# Patient Record
Sex: FEMALE | Race: BLACK OR AFRICAN AMERICAN | NOT HISPANIC OR LATINO | Employment: OTHER | URBAN - METROPOLITAN AREA
[De-identification: names, ages, dates, MRNs, and addresses within clinical notes are randomized per-mention and may not be internally consistent; named-entity substitution may affect disease eponyms.]

---

## 2019-01-14 ENCOUNTER — HOSPITAL ENCOUNTER (EMERGENCY)
Facility: HOSPITAL | Age: 69
Discharge: HOME | End: 2019-01-14
Attending: EMERGENCY MEDICINE
Payer: MEDICARE

## 2019-01-14 VITALS
HEART RATE: 89 BPM | DIASTOLIC BLOOD PRESSURE: 81 MMHG | RESPIRATION RATE: 21 BRPM | TEMPERATURE: 98 F | HEIGHT: 62 IN | BODY MASS INDEX: 22.08 KG/M2 | OXYGEN SATURATION: 98 % | SYSTOLIC BLOOD PRESSURE: 172 MMHG | WEIGHT: 120 LBS

## 2019-01-14 DIAGNOSIS — N39.0 URINARY TRACT INFECTION: ICD-10-CM

## 2019-01-14 LAB
BACTERIA #/AREA URNS HPF: ABNORMAL /HPF
BILIRUB UR QL STRIP: NEGATIVE
CLARITY UR: ABNORMAL
COLOR UR: YELLOW
GLUCOSE UR QL STRIP: NEGATIVE
HGB UR QL STRIP: ABNORMAL
HYALINE CASTS #/AREA URNS LPF: 0 /LPF
KETONES UR QL STRIP: NEGATIVE
LEUKOCYTE ESTERASE UR QL STRIP: ABNORMAL
MICROSCOPIC COMMENT: ABNORMAL
NITRITE UR QL STRIP: NEGATIVE
PH UR STRIP: 5 [PH] (ref 5–8)
POCT GLUCOSE: 103 MG/DL (ref 70–110)
PROT UR QL STRIP: ABNORMAL
RBC #/AREA URNS HPF: 5 /HPF (ref 0–4)
SP GR UR STRIP: 1.01 (ref 1–1.03)
URN SPEC COLLECT METH UR: ABNORMAL
UROBILINOGEN UR STRIP-ACNC: NEGATIVE EU/DL
WBC #/AREA URNS HPF: >100 /HPF (ref 0–5)

## 2019-01-14 PROCEDURE — 93010 ELECTROCARDIOGRAM REPORT: CPT | Mod: ,,, | Performed by: INTERNAL MEDICINE

## 2019-01-14 PROCEDURE — 87088 URINE BACTERIA CULTURE: CPT

## 2019-01-14 PROCEDURE — 87186 SC STD MICRODIL/AGAR DIL: CPT

## 2019-01-14 PROCEDURE — 87086 URINE CULTURE/COLONY COUNT: CPT

## 2019-01-14 PROCEDURE — 93010 EKG 12-LEAD: ICD-10-PCS | Mod: ,,, | Performed by: INTERNAL MEDICINE

## 2019-01-14 PROCEDURE — 87077 CULTURE AEROBIC IDENTIFY: CPT

## 2019-01-14 PROCEDURE — 99284 EMERGENCY DEPT VISIT MOD MDM: CPT | Mod: 25

## 2019-01-14 PROCEDURE — 81000 URINALYSIS NONAUTO W/SCOPE: CPT

## 2019-01-14 PROCEDURE — 25000003 PHARM REV CODE 250: Performed by: EMERGENCY MEDICINE

## 2019-01-14 PROCEDURE — 93005 ELECTROCARDIOGRAM TRACING: CPT

## 2019-01-14 PROCEDURE — 82962 GLUCOSE BLOOD TEST: CPT

## 2019-01-14 RX ORDER — SULFAMETHOXAZOLE AND TRIMETHOPRIM 800; 160 MG/1; MG/1
1 TABLET ORAL 2 TIMES DAILY
Qty: 14 TABLET | Refills: 0 | Status: SHIPPED | OUTPATIENT
Start: 2019-01-14 | End: 2019-01-21

## 2019-01-14 RX ORDER — SULFAMETHOXAZOLE AND TRIMETHOPRIM 800; 160 MG/1; MG/1
1 TABLET ORAL
Status: COMPLETED | OUTPATIENT
Start: 2019-01-14 | End: 2019-01-14

## 2019-01-14 RX ADMIN — SULFAMETHOXAZOLE AND TRIMETHOPRIM 1 TABLET: 800; 160 TABLET ORAL at 09:01

## 2019-01-15 NOTE — ED PROVIDER NOTES
Encounter Date: 1/14/2019    SCRIBE #1 NOTE: I, Berry Mccollum, am scribing for, and in the presence of,  Lele Huertas MD. I have scribed the following portions of the note - Other sections scribed: HPI, ROS, PE.       History     Chief Complaint   Patient presents with    Urinary Tract Infection     foul smelling urine.   was given abx x 1 week ago for same.   not sleeping.     CC: Urinary Tract Infection    68 year old female  has a past medical history of Amputee, below knee (bilateral), Dementia, Diabetes mellitus, and Hypertension presents to the ED accompanied w/ son for evaluation of chest pain. Pt's son reports pt complained of chest pain earlier. Pt's son wanted to bring her in for evaluation due to her dementia. At exam time, pt denies chest pain. Pt reports she was diagnosed w/ a UTI on 1/10/19 and given antibiotics. Pt reports she currently has a sting w/ urination. She wears adult diapers. Pt is a smoker. No other symptoms reported.       The history is provided by the patient and a relative. No  was used.     Review of patient's allergies indicates:  No Known Allergies  Past Medical History:   Diagnosis Date    Amputee, below knee bilateral    Dementia     Diabetes mellitus     Hypertension      Past Surgical History:   Procedure Laterality Date    bka      cardiac stents      HYSTERECTOMY       No family history on file.  Social History     Tobacco Use    Smoking status: Current Every Day Smoker     Packs/day: 1.00     Years: 15.00     Pack years: 15.00     Types: Cigarettes   Substance Use Topics    Alcohol use: No    Drug use: No     Review of Systems   Constitutional: Negative for appetite change and fever.   HENT: Negative for rhinorrhea and sore throat.    Eyes: Negative for visual disturbance.   Respiratory: Negative for cough and shortness of breath.    Cardiovascular: Negative for chest pain.   Gastrointestinal: Negative for abdominal pain.    Genitourinary: Positive for dysuria.   Musculoskeletal: Negative for gait problem.   Skin: Negative for rash.   Neurological: Negative for syncope.       Physical Exam     Initial Vitals [01/14/19 1805]   BP Pulse Resp Temp SpO2   (!) 169/70 96 16 98.3 °F (36.8 °C) 95 %      MAP       --         Physical Exam    ED Course   Procedures  Labs Reviewed   URINALYSIS, REFLEX TO URINE CULTURE - Abnormal; Notable for the following components:       Result Value    Appearance, UA Cloudy (*)     Protein, UA 2+ (*)     Occult Blood UA 2+ (*)     Leukocytes, UA 3+ (*)     All other components within normal limits    Narrative:     Preferred Collection Type->Urine, Clean Catch   URINALYSIS MICROSCOPIC - Abnormal; Notable for the following components:    RBC, UA 5 (*)     WBC, UA >100 (*)     Bacteria, UA Moderate (*)     All other components within normal limits    Narrative:     Preferred Collection Type->Urine, Clean Catch   CULTURE, URINE   POCT GLUCOSE MONITORING CONTINUOUS       EKG:  Normal sinus rhythm rate of 89 Q-waves V1 V2 V3 intraventricular conduction delay V2 V3 no evidence of ST elevation    Imaging Results    None                   Medical decision making         Clinical Impression:   The encounter diagnosis was Urinary tract infection.            Medical decision making patient with subjective complaints of urinary tract infection urinalysis confirms infection there is no evidence of toxicity, sepsis, pyelonephritis, acute abdomen.  Patient is discharged home with a prescription for Septra DS b.i.d. for 14 days urine culture pending at time of discharge.      I, Dr. Aleksandar Royal, personally performed the services described in this documentation.   All medical record entries made by the scribe were at my direction and in my presence.   I have reviewed the chart and agree that the record is accurate and complete.   Aleksandar Royal MD.  9:14 PM 01/14/2019        Aleksandar Royal MD  01/14/19 1319        Aleksandar Royal MD  01/14/19 [id number]

## 2019-01-16 LAB — BACTERIA UR CULT: NORMAL

## 2019-01-23 ENCOUNTER — HOSPITAL ENCOUNTER (INPATIENT)
Facility: HOSPITAL | Age: 69
LOS: 13 days | Discharge: HOSPICE | DRG: 189 | End: 2019-02-05
Attending: EMERGENCY MEDICINE | Admitting: HOSPITALIST
Payer: MEDICARE

## 2019-01-23 DIAGNOSIS — J96.11 CHRONIC RESPIRATORY FAILURE WITH HYPOXIA: ICD-10-CM

## 2019-01-23 DIAGNOSIS — J96.01 ACUTE RESPIRATORY FAILURE WITH HYPOXIA AND HYPERCAPNIA: Primary | ICD-10-CM

## 2019-01-23 DIAGNOSIS — J96.02 ACUTE RESPIRATORY FAILURE WITH HYPOXIA AND HYPERCAPNIA: Primary | ICD-10-CM

## 2019-01-23 DIAGNOSIS — I21.4 NSTEMI (NON-ST ELEVATED MYOCARDIAL INFARCTION): ICD-10-CM

## 2019-01-23 DIAGNOSIS — I27.20 PULMONARY HYPERTENSION: ICD-10-CM

## 2019-01-23 DIAGNOSIS — J44.9 COPD (CHRONIC OBSTRUCTIVE PULMONARY DISEASE): ICD-10-CM

## 2019-01-23 DIAGNOSIS — J96.12 CHRONIC RESPIRATORY FAILURE WITH HYPOXIA AND HYPERCAPNIA: ICD-10-CM

## 2019-01-23 DIAGNOSIS — Z51.5 PALLIATIVE CARE ENCOUNTER: ICD-10-CM

## 2019-01-23 DIAGNOSIS — I50.43 ACUTE ON CHRONIC COMBINED SYSTOLIC AND DIASTOLIC HEART FAILURE: ICD-10-CM

## 2019-01-23 DIAGNOSIS — J96.02 ACUTE RESPIRATORY ACIDOSIS: ICD-10-CM

## 2019-01-23 DIAGNOSIS — R07.9 CHEST PAIN: ICD-10-CM

## 2019-01-23 DIAGNOSIS — I50.41 ACUTE COMBINED SYSTOLIC AND DIASTOLIC HEART FAILURE: ICD-10-CM

## 2019-01-23 DIAGNOSIS — N17.9 AKI (ACUTE KIDNEY INJURY): ICD-10-CM

## 2019-01-23 DIAGNOSIS — E87.5 HYPERKALEMIA: ICD-10-CM

## 2019-01-23 DIAGNOSIS — J96.11 CHRONIC RESPIRATORY FAILURE WITH HYPOXIA AND HYPERCAPNIA: ICD-10-CM

## 2019-01-23 DIAGNOSIS — I50.42 CHRONIC COMBINED SYSTOLIC AND DIASTOLIC HEART FAILURE: ICD-10-CM

## 2019-01-23 LAB
ALBUMIN SERPL BCP-MCNC: 3.6 G/DL
ALLENS TEST: ABNORMAL
ALP SERPL-CCNC: 111 U/L
ALT SERPL W/O P-5'-P-CCNC: 8 U/L
ANION GAP SERPL CALC-SCNC: 15 MMOL/L (ref 8–16)
ANION GAP SERPL CALC-SCNC: 5 MMOL/L
ANION GAP SERPL CALC-SCNC: 8 MMOL/L
ANION GAP SERPL CALC-SCNC: 9 MMOL/L
AORTIC ROOT ANNULUS: 2.42 CM
AORTIC VALVE CUSP SEPERATION: 1.65 CM
ASCENDING AORTA: 2.4 CM
AST SERPL-CCNC: 19 U/L
AV INDEX (PROSTH): 0.65
AV MEAN GRADIENT: 2.38 MMHG
AV PEAK GRADIENT: 4.08 MMHG
AV VALVE AREA: 2.12 CM2
AV VELOCITY RATIO: 0.73
BACTERIA #/AREA URNS HPF: ABNORMAL /HPF
BASOPHILS # BLD AUTO: 0.01 K/UL
BASOPHILS NFR BLD: 0.1 %
BILIRUB SERPL-MCNC: 0.2 MG/DL
BILIRUB UR QL STRIP: NEGATIVE
BNP SERPL-MCNC: 2868 PG/ML
BSA FOR ECHO PROCEDURE: 1.54 M2
BUN SERPL-MCNC: 23 MG/DL
BUN SERPL-MCNC: 24 MG/DL (ref 6–30)
BUN SERPL-MCNC: 26 MG/DL
BUN SERPL-MCNC: 28 MG/DL
CALCIUM SERPL-MCNC: 8 MG/DL
CALCIUM SERPL-MCNC: 8.3 MG/DL
CALCIUM SERPL-MCNC: 8.8 MG/DL
CHLORIDE SERPL-SCNC: 104 MMOL/L
CHLORIDE SERPL-SCNC: 106 MMOL/L (ref 95–110)
CHLORIDE SERPL-SCNC: 108 MMOL/L
CHLORIDE SERPL-SCNC: 108 MMOL/L
CLARITY UR: ABNORMAL
CO2 SERPL-SCNC: 20 MMOL/L
CO2 SERPL-SCNC: 21 MMOL/L
CO2 SERPL-SCNC: 23 MMOL/L
COLOR UR: ABNORMAL
CREAT SERPL-MCNC: 2.6 MG/DL
CREAT SERPL-MCNC: 2.6 MG/DL (ref 0.5–1.4)
CREAT SERPL-MCNC: 2.7 MG/DL
CREAT SERPL-MCNC: 3 MG/DL
CV ECHO LV RWT: 0.3 CM
D DIMER PPP IA.FEU-MCNC: 1.11 MG/L FEU
DELSYS: ABNORMAL
DIFFERENTIAL METHOD: ABNORMAL
DOP CALC AO PEAK VEL: 1.01 M/S
DOP CALC AO VTI: 19.45 CM
DOP CALC LVOT AREA: 3.27 CM2
DOP CALC LVOT DIAMETER: 2.04 CM
DOP CALC LVOT PEAK VEL: 0.74 M/S
DOP CALC LVOT STROKE VOLUME: 41.33 CM3
DOP CALCLVOT PEAK VEL VTI: 12.65 CM
ECHO LV POSTERIOR WALL: 0.75 CM (ref 0.6–1.1)
EOSINOPHIL # BLD AUTO: 0.1 K/UL
EOSINOPHIL NFR BLD: 1.2 %
EP: 5
EP: 5
ERYTHROCYTE [DISTWIDTH] IN BLOOD BY AUTOMATED COUNT: 14.3 %
ERYTHROCYTE [SEDIMENTATION RATE] IN BLOOD BY WESTERGREN METHOD: 12 MM/H
ERYTHROCYTE [SEDIMENTATION RATE] IN BLOOD BY WESTERGREN METHOD: 12 MM/H
EST. GFR  (AFRICAN AMERICAN): 17 ML/MIN/1.73 M^2
EST. GFR  (AFRICAN AMERICAN): 20 ML/MIN/1.73 M^2
EST. GFR  (AFRICAN AMERICAN): 21 ML/MIN/1.73 M^2
EST. GFR  (NON AFRICAN AMERICAN): 15 ML/MIN/1.73 M^2
EST. GFR  (NON AFRICAN AMERICAN): 17 ML/MIN/1.73 M^2
EST. GFR  (NON AFRICAN AMERICAN): 18 ML/MIN/1.73 M^2
FIO2: 35
FIO2: 40
FLOW: 2
FRACTIONAL SHORTENING: 13 % (ref 28–44)
GLUCOSE SERPL-MCNC: 133 MG/DL
GLUCOSE SERPL-MCNC: 156 MG/DL
GLUCOSE SERPL-MCNC: 176 MG/DL
GLUCOSE SERPL-MCNC: 184 MG/DL (ref 70–110)
GLUCOSE UR QL STRIP: ABNORMAL
HCO3 UR-SCNC: 19.1 MMOL/L (ref 24–28)
HCO3 UR-SCNC: 19.8 MMOL/L (ref 24–28)
HCO3 UR-SCNC: 20.4 MMOL/L (ref 24–28)
HCT VFR BLD AUTO: 32.1 %
HCT VFR BLD CALC: 32 %PCV (ref 36–54)
HGB BLD-MCNC: 10.2 G/DL
HGB UR QL STRIP: ABNORMAL
HYALINE CASTS #/AREA URNS LPF: 0 /LPF
INTERVENTRICULAR SEPTUM: 0.72 CM (ref 0.6–1.1)
IP: 10
IP: 12
IVRT: 0.08 MSEC
KETONES UR QL STRIP: NEGATIVE
LA MAJOR: 4.7 CM
LA MINOR: 5.23 CM
LA WIDTH: 4.31 CM
LACTATE SERPL-SCNC: 2 MMOL/L
LACTATE SERPL-SCNC: 2.3 MMOL/L
LEFT ATRIUM SIZE: 4.15 CM
LEFT ATRIUM VOLUME INDEX: 48.9 ML/M2
LEFT ATRIUM VOLUME: 75.27 CM3
LEFT INTERNAL DIMENSION IN SYSTOLE: 4.4 CM (ref 2.1–4)
LEFT VENTRICLE DIASTOLIC VOLUME INDEX: 78.43 ML/M2
LEFT VENTRICLE DIASTOLIC VOLUME: 120.66 ML
LEFT VENTRICLE MASS INDEX: 80.4 G/M2
LEFT VENTRICLE SYSTOLIC VOLUME INDEX: 57.1 ML/M2
LEFT VENTRICLE SYSTOLIC VOLUME: 87.85 ML
LEFT VENTRICULAR INTERNAL DIMENSION IN DIASTOLE: 5.04 CM (ref 3.5–6)
LEFT VENTRICULAR MASS: 123.63 G
LEUKOCYTE ESTERASE UR QL STRIP: ABNORMAL
LYMPHOCYTES # BLD AUTO: 1.4 K/UL
LYMPHOCYTES NFR BLD: 18.7 %
MAGNESIUM SERPL-MCNC: 1.8 MG/DL
MCH RBC QN AUTO: 27.1 PG
MCHC RBC AUTO-ENTMCNC: 31.8 G/DL
MCV RBC AUTO: 85 FL
MICROSCOPIC COMMENT: ABNORMAL
MIN VOL: 5.1
MODE: ABNORMAL
MONOCYTES # BLD AUTO: 0.6 K/UL
MONOCYTES NFR BLD: 7.5 %
MV PEAK E VEL: 1.36 M/S
NEUTROPHILS # BLD AUTO: 5.5 K/UL
NEUTROPHILS NFR BLD: 72.5 %
NITRITE UR QL STRIP: NEGATIVE
PCO2 BLDA: 57.9 MMHG (ref 35–45)
PCO2 BLDA: 60.8 MMHG (ref 35–45)
PCO2 BLDA: 74.4 MMHG (ref 35–45)
PH SMN: 7.02 [PH] (ref 7.35–7.45)
PH SMN: 7.13 [PH] (ref 7.35–7.45)
PH SMN: 7.14 [PH] (ref 7.35–7.45)
PH UR STRIP: 5 [PH] (ref 5–8)
PISA TR MAX VEL: 4.13 M/S
PLATELET # BLD AUTO: 322 K/UL
PMV BLD AUTO: 10 FL
PO2 BLDA: 137 MMHG (ref 80–100)
PO2 BLDA: 145 MMHG (ref 80–100)
PO2 BLDA: 21 MMHG (ref 40–60)
POC BE: -12 MMOL/L
POC BE: -9 MMOL/L
POC BE: -9 MMOL/L
POC IONIZED CALCIUM: 1.2 MMOL/L (ref 1.06–1.42)
POC SATURATED O2: 17 % (ref 95–100)
POC SATURATED O2: 98 % (ref 95–100)
POC SATURATED O2: 98 % (ref 95–100)
POC TCO2 (MEASURED): 22 MMOL/L (ref 23–29)
POC TCO2: 21 MMOL/L (ref 24–29)
POC TCO2: 22 MMOL/L (ref 23–27)
POC TCO2: 22 MMOL/L (ref 23–27)
POCT GLUCOSE: 155 MG/DL (ref 70–110)
POCT GLUCOSE: 189 MG/DL (ref 70–110)
POCT GLUCOSE: 198 MG/DL (ref 70–110)
POTASSIUM BLD-SCNC: 7.2 MMOL/L (ref 3.5–5.1)
POTASSIUM SERPL-SCNC: 5.9 MMOL/L
POTASSIUM SERPL-SCNC: 6.1 MMOL/L
POTASSIUM SERPL-SCNC: 6.6 MMOL/L
PROT SERPL-MCNC: 7.2 G/DL
PROT UR QL STRIP: ABNORMAL
PV PEAK VELOCITY: 1 CM/S
RA MAJOR: 4.58 CM
RA PRESSURE: 8 MMHG
RA WIDTH: 3.32 CM
RBC # BLD AUTO: 3.77 M/UL
RBC #/AREA URNS HPF: 10 /HPF (ref 0–4)
RIGHT VENTRICULAR END-DIASTOLIC DIMENSION: 3 CM
RV TISSUE DOPPLER FREE WALL SYSTOLIC VELOCITY 1 (APICAL 4 CHAMBER VIEW): 8.72 M/S
SAMPLE: ABNORMAL
SINUS: 2.89 CM
SITE: ABNORMAL
SODIUM BLD-SCNC: 134 MMOL/L (ref 136–145)
SODIUM SERPL-SCNC: 133 MMOL/L
SODIUM SERPL-SCNC: 134 MMOL/L
SODIUM SERPL-SCNC: 139 MMOL/L
SP GR UR STRIP: 1.01 (ref 1–1.03)
SP02: 100
SP02: 95
SPONT RATE: 28
SPONT RATE: 30
SQUAMOUS #/AREA URNS HPF: 5 /HPF
STJ: 1.9 CM
TR MAX PG: 68.23 MMHG
TRICUSPID ANNULAR PLANE SYSTOLIC EXCURSION: 1.75 CM
TROPONIN I SERPL DL<=0.01 NG/ML-MCNC: 0.05 NG/ML
TROPONIN I SERPL DL<=0.01 NG/ML-MCNC: 0.09 NG/ML
TV REST PULMONARY ARTERY PRESSURE: 76 MMHG
URN SPEC COLLECT METH UR: ABNORMAL
UROBILINOGEN UR STRIP-ACNC: NEGATIVE EU/DL
WBC # BLD AUTO: 7.61 K/UL
WBC #/AREA URNS HPF: >100 /HPF (ref 0–5)

## 2019-01-23 PROCEDURE — 25000003 PHARM REV CODE 250: Performed by: EMERGENCY MEDICINE

## 2019-01-23 PROCEDURE — 96361 HYDRATE IV INFUSION ADD-ON: CPT

## 2019-01-23 PROCEDURE — 99291 PR CRITICAL CARE, E/M 30-74 MINUTES: ICD-10-PCS | Mod: ,,, | Performed by: NURSE PRACTITIONER

## 2019-01-23 PROCEDURE — 96368 THER/DIAG CONCURRENT INF: CPT

## 2019-01-23 PROCEDURE — 27000221 HC OXYGEN, UP TO 24 HOURS

## 2019-01-23 PROCEDURE — 96376 TX/PRO/DX INJ SAME DRUG ADON: CPT

## 2019-01-23 PROCEDURE — 84484 ASSAY OF TROPONIN QUANT: CPT

## 2019-01-23 PROCEDURE — 84295 ASSAY OF SERUM SODIUM: CPT

## 2019-01-23 PROCEDURE — 83605 ASSAY OF LACTIC ACID: CPT

## 2019-01-23 PROCEDURE — 63600175 PHARM REV CODE 636 W HCPCS: Performed by: EMERGENCY MEDICINE

## 2019-01-23 PROCEDURE — 87086 URINE CULTURE/COLONY COUNT: CPT

## 2019-01-23 PROCEDURE — 82800 BLOOD PH: CPT

## 2019-01-23 PROCEDURE — 99900035 HC TECH TIME PER 15 MIN (STAT)

## 2019-01-23 PROCEDURE — 85379 FIBRIN DEGRADATION QUANT: CPT

## 2019-01-23 PROCEDURE — 81000 URINALYSIS NONAUTO W/SCOPE: CPT

## 2019-01-23 PROCEDURE — 80048 BASIC METABOLIC PNL TOTAL CA: CPT | Mod: 91

## 2019-01-23 PROCEDURE — 25000242 PHARM REV CODE 250 ALT 637 W/ HCPCS: Performed by: INTERNAL MEDICINE

## 2019-01-23 PROCEDURE — 93010 ELECTROCARDIOGRAM REPORT: CPT | Mod: ,,, | Performed by: INTERNAL MEDICINE

## 2019-01-23 PROCEDURE — 93010 EKG 12-LEAD: ICD-10-PCS | Mod: 76,,, | Performed by: INTERNAL MEDICINE

## 2019-01-23 PROCEDURE — 83735 ASSAY OF MAGNESIUM: CPT

## 2019-01-23 PROCEDURE — 25000242 PHARM REV CODE 250 ALT 637 W/ HCPCS: Performed by: EMERGENCY MEDICINE

## 2019-01-23 PROCEDURE — 93005 ELECTROCARDIOGRAM TRACING: CPT

## 2019-01-23 PROCEDURE — 96367 TX/PROPH/DG ADDL SEQ IV INF: CPT

## 2019-01-23 PROCEDURE — 27000190 HC CPAP FULL FACE MASK W/VALVE

## 2019-01-23 PROCEDURE — 94761 N-INVAS EAR/PLS OXIMETRY MLT: CPT

## 2019-01-23 PROCEDURE — 63600175 PHARM REV CODE 636 W HCPCS: Performed by: INTERNAL MEDICINE

## 2019-01-23 PROCEDURE — 99291 CRITICAL CARE FIRST HOUR: CPT | Mod: ,,, | Performed by: NURSE PRACTITIONER

## 2019-01-23 PROCEDURE — 36600 WITHDRAWAL OF ARTERIAL BLOOD: CPT

## 2019-01-23 PROCEDURE — 94660 CPAP INITIATION&MGMT: CPT

## 2019-01-23 PROCEDURE — 82803 BLOOD GASES ANY COMBINATION: CPT

## 2019-01-23 PROCEDURE — 82565 ASSAY OF CREATININE: CPT

## 2019-01-23 PROCEDURE — 83880 ASSAY OF NATRIURETIC PEPTIDE: CPT

## 2019-01-23 PROCEDURE — 85014 HEMATOCRIT: CPT

## 2019-01-23 PROCEDURE — 84132 ASSAY OF SERUM POTASSIUM: CPT

## 2019-01-23 PROCEDURE — 94640 AIRWAY INHALATION TREATMENT: CPT

## 2019-01-23 PROCEDURE — 96375 TX/PRO/DX INJ NEW DRUG ADDON: CPT

## 2019-01-23 PROCEDURE — 93010 ELECTROCARDIOGRAM REPORT: CPT | Mod: 76,,, | Performed by: INTERNAL MEDICINE

## 2019-01-23 PROCEDURE — 80053 COMPREHEN METABOLIC PANEL: CPT

## 2019-01-23 PROCEDURE — 36415 COLL VENOUS BLD VENIPUNCTURE: CPT

## 2019-01-23 PROCEDURE — 82330 ASSAY OF CALCIUM: CPT

## 2019-01-23 PROCEDURE — 25000003 PHARM REV CODE 250: Performed by: INTERNAL MEDICINE

## 2019-01-23 PROCEDURE — 99291 CRITICAL CARE FIRST HOUR: CPT | Mod: 25

## 2019-01-23 PROCEDURE — 20000000 HC ICU ROOM

## 2019-01-23 PROCEDURE — 96365 THER/PROPH/DIAG IV INF INIT: CPT

## 2019-01-23 PROCEDURE — 85025 COMPLETE CBC W/AUTO DIFF WBC: CPT

## 2019-01-23 PROCEDURE — 87040 BLOOD CULTURE FOR BACTERIA: CPT

## 2019-01-23 RX ORDER — DEXTROSE 50 % IN WATER (D50W) INTRAVENOUS SYRINGE
25
Status: COMPLETED | OUTPATIENT
Start: 2019-01-23 | End: 2019-01-23

## 2019-01-23 RX ORDER — ACETAMINOPHEN 325 MG/1
650 TABLET ORAL EVERY 4 HOURS PRN
Status: DISCONTINUED | OUTPATIENT
Start: 2019-01-23 | End: 2019-02-05 | Stop reason: HOSPADM

## 2019-01-23 RX ORDER — ASPIRIN 325 MG
325 TABLET ORAL
Status: COMPLETED | OUTPATIENT
Start: 2019-01-23 | End: 2019-01-23

## 2019-01-23 RX ORDER — CARVEDILOL 6.25 MG/1
6.25 TABLET ORAL 2 TIMES DAILY
Status: DISCONTINUED | OUTPATIENT
Start: 2019-01-23 | End: 2019-01-24

## 2019-01-23 RX ORDER — ATORVASTATIN CALCIUM 10 MG/1
20 TABLET, FILM COATED ORAL NIGHTLY
Status: DISCONTINUED | OUTPATIENT
Start: 2019-01-23 | End: 2019-01-25

## 2019-01-23 RX ORDER — IPRATROPIUM BROMIDE AND ALBUTEROL SULFATE 2.5; .5 MG/3ML; MG/3ML
3 SOLUTION RESPIRATORY (INHALATION)
Status: DISCONTINUED | OUTPATIENT
Start: 2019-01-23 | End: 2019-02-05 | Stop reason: HOSPADM

## 2019-01-23 RX ORDER — AMLODIPINE BESYLATE 5 MG/1
10 TABLET ORAL DAILY
Status: DISCONTINUED | OUTPATIENT
Start: 2019-01-24 | End: 2019-01-24

## 2019-01-23 RX ORDER — CARVEDILOL 6.25 MG/1
6.25 TABLET ORAL
Status: COMPLETED | OUTPATIENT
Start: 2019-01-23 | End: 2019-01-23

## 2019-01-23 RX ORDER — ONDANSETRON 2 MG/ML
4 INJECTION INTRAMUSCULAR; INTRAVENOUS EVERY 8 HOURS PRN
Status: DISCONTINUED | OUTPATIENT
Start: 2019-01-23 | End: 2019-02-05 | Stop reason: HOSPADM

## 2019-01-23 RX ORDER — ENOXAPARIN SODIUM 100 MG/ML
30 INJECTION SUBCUTANEOUS EVERY 24 HOURS
Status: DISCONTINUED | OUTPATIENT
Start: 2019-01-23 | End: 2019-01-24

## 2019-01-23 RX ORDER — ACETAMINOPHEN 500 MG
1000 TABLET ORAL
Status: COMPLETED | OUTPATIENT
Start: 2019-01-23 | End: 2019-01-23

## 2019-01-23 RX ORDER — SODIUM CHLORIDE 9 MG/ML
1000 INJECTION, SOLUTION INTRAVENOUS
Status: COMPLETED | OUTPATIENT
Start: 2019-01-23 | End: 2019-01-23

## 2019-01-23 RX ORDER — METHYLPREDNISOLONE SOD SUCC 125 MG
125 VIAL (EA) INJECTION
Status: COMPLETED | OUTPATIENT
Start: 2019-01-23 | End: 2019-01-23

## 2019-01-23 RX ORDER — SODIUM BICARBONATE 1 MEQ/ML
50 SYRINGE (ML) INTRAVENOUS ONCE
Status: DISCONTINUED | OUTPATIENT
Start: 2019-01-23 | End: 2019-01-23

## 2019-01-23 RX ORDER — ALBUTEROL SULFATE 2.5 MG/.5ML
10 SOLUTION RESPIRATORY (INHALATION)
Status: COMPLETED | OUTPATIENT
Start: 2019-01-23 | End: 2019-01-23

## 2019-01-23 RX ORDER — SODIUM BICARBONATE 1 MEQ/ML
50 SYRINGE (ML) INTRAVENOUS
Status: COMPLETED | OUTPATIENT
Start: 2019-01-23 | End: 2019-01-23

## 2019-01-23 RX ORDER — IPRATROPIUM BROMIDE 0.5 MG/2.5ML
0.5 SOLUTION RESPIRATORY (INHALATION)
Status: COMPLETED | OUTPATIENT
Start: 2019-01-23 | End: 2019-01-23

## 2019-01-23 RX ORDER — GLUCAGON 1 MG
1 KIT INJECTION
Status: DISCONTINUED | OUTPATIENT
Start: 2019-01-23 | End: 2019-02-05 | Stop reason: HOSPADM

## 2019-01-23 RX ORDER — SODIUM CHLORIDE 0.9 % (FLUSH) 0.9 %
3 SYRINGE (ML) INJECTION
Status: DISCONTINUED | OUTPATIENT
Start: 2019-01-23 | End: 2019-02-05 | Stop reason: HOSPADM

## 2019-01-23 RX ORDER — SODIUM BICARBONATE 1 MEQ/ML
50 SYRINGE (ML) INTRAVENOUS ONCE
Status: COMPLETED | OUTPATIENT
Start: 2019-01-23 | End: 2019-01-23

## 2019-01-23 RX ORDER — IPRATROPIUM BROMIDE AND ALBUTEROL SULFATE 2.5; .5 MG/3ML; MG/3ML
3 SOLUTION RESPIRATORY (INHALATION) EVERY 4 HOURS
Status: DISCONTINUED | OUTPATIENT
Start: 2019-01-23 | End: 2019-01-23 | Stop reason: SDUPTHER

## 2019-01-23 RX ORDER — DONEPEZIL HYDROCHLORIDE 5 MG/1
5 TABLET, FILM COATED ORAL NIGHTLY
Status: DISCONTINUED | OUTPATIENT
Start: 2019-01-23 | End: 2019-01-25

## 2019-01-23 RX ORDER — INSULIN ASPART 100 [IU]/ML
0-5 INJECTION, SOLUTION INTRAVENOUS; SUBCUTANEOUS EVERY 6 HOURS PRN
Status: DISCONTINUED | OUTPATIENT
Start: 2019-01-23 | End: 2019-02-05 | Stop reason: HOSPADM

## 2019-01-23 RX ORDER — SODIUM CHLORIDE 9 MG/ML
INJECTION, SOLUTION INTRAVENOUS CONTINUOUS
Status: DISCONTINUED | OUTPATIENT
Start: 2019-01-23 | End: 2019-01-24

## 2019-01-23 RX ADMIN — DEXTROSE MONOHYDRATE 25 G: 25 INJECTION, SOLUTION INTRAVENOUS at 10:01

## 2019-01-23 RX ADMIN — SODIUM POLYSTYRENE SULFONATE 15 G: 15 SUSPENSION ORAL; RECTAL at 10:01

## 2019-01-23 RX ADMIN — CALCIUM GLUCONATE 1 G: 94 INJECTION, SOLUTION INTRAVENOUS at 07:01

## 2019-01-23 RX ADMIN — SODIUM BICARBONATE 50 MEQ: 84 INJECTION INTRAVENOUS at 06:01

## 2019-01-23 RX ADMIN — ACETAMINOPHEN 1000 MG: 500 TABLET, FILM COATED ORAL at 07:01

## 2019-01-23 RX ADMIN — ATORVASTATIN CALCIUM 20 MG: 10 TABLET, FILM COATED ORAL at 09:01

## 2019-01-23 RX ADMIN — CARVEDILOL 6.25 MG: 6.25 TABLET, FILM COATED ORAL at 09:01

## 2019-01-23 RX ADMIN — SODIUM BICARBONATE 50 MEQ: 84 INJECTION, SOLUTION INTRAVENOUS at 12:01

## 2019-01-23 RX ADMIN — INSULIN HUMAN 8 UNITS: 100 INJECTION, SOLUTION PARENTERAL at 06:01

## 2019-01-23 RX ADMIN — CEFTRIAXONE 2 G: 2 INJECTION, SOLUTION INTRAVENOUS at 07:01

## 2019-01-23 RX ADMIN — METHYLPREDNISOLONE SODIUM SUCCINATE 125 MG: 125 INJECTION, POWDER, FOR SOLUTION INTRAMUSCULAR; INTRAVENOUS at 07:01

## 2019-01-23 RX ADMIN — SODIUM CHLORIDE 1000 ML: 0.9 INJECTION, SOLUTION INTRAVENOUS at 10:01

## 2019-01-23 RX ADMIN — ENOXAPARIN SODIUM 30 MG: 100 INJECTION SUBCUTANEOUS at 04:01

## 2019-01-23 RX ADMIN — ASPIRIN 325 MG ORAL TABLET 325 MG: 325 PILL ORAL at 05:01

## 2019-01-23 RX ADMIN — CARVEDILOL 6.25 MG: 6.25 TABLET, FILM COATED ORAL at 07:01

## 2019-01-23 RX ADMIN — IPRATROPIUM BROMIDE 0.5 MG: 0.5 SOLUTION RESPIRATORY (INHALATION) at 07:01

## 2019-01-23 RX ADMIN — SODIUM CHLORIDE 1632 ML: 0.9 INJECTION, SOLUTION INTRAVENOUS at 07:01

## 2019-01-23 RX ADMIN — IPRATROPIUM BROMIDE AND ALBUTEROL SULFATE 3 ML: .5; 3 SOLUTION RESPIRATORY (INHALATION) at 04:01

## 2019-01-23 RX ADMIN — ALBUTEROL SULFATE 10 MG: 2.5 SOLUTION RESPIRATORY (INHALATION) at 07:01

## 2019-01-23 RX ADMIN — INSULIN HUMAN 2 UNITS: 100 INJECTION, SOLUTION PARENTERAL at 10:01

## 2019-01-23 RX ADMIN — DONEPEZIL HYDROCHLORIDE 5 MG: 5 TABLET, FILM COATED ORAL at 09:01

## 2019-01-23 RX ADMIN — INSULIN HUMAN 3 UNITS: 100 INJECTION, SOLUTION PARENTERAL at 10:01

## 2019-01-23 RX ADMIN — CALCIUM GLUCONATE 1 G: 94 INJECTION, SOLUTION INTRAVENOUS at 10:01

## 2019-01-23 RX ADMIN — SODIUM POLYSTYRENE SULFONATE 15 G: 15 SUSPENSION ORAL; RECTAL at 09:01

## 2019-01-23 RX ADMIN — DEXTROSE MONOHYDRATE 12.5 G: 25 INJECTION, SOLUTION INTRAVENOUS at 06:01

## 2019-01-23 RX ADMIN — IPRATROPIUM BROMIDE AND ALBUTEROL SULFATE 3 ML: .5; 3 SOLUTION RESPIRATORY (INHALATION) at 08:01

## 2019-01-23 RX ADMIN — SODIUM CHLORIDE: 0.9 INJECTION, SOLUTION INTRAVENOUS at 04:01

## 2019-01-23 NOTE — SUBJECTIVE & OBJECTIVE
Past Medical History:   Diagnosis Date    Amputee, below knee bilateral    Cigarette smoker     Dementia     Diabetes mellitus     Hypertension     Requires assistance with activities of daily living (ADL)     Wheelchair dependent        Past Surgical History:   Procedure Laterality Date    bka      cardiac stents      HYSTERECTOMY         Review of patient's allergies indicates:  No Known Allergies    No current facility-administered medications on file prior to encounter.      Current Outpatient Medications on File Prior to Encounter   Medication Sig    amlodipine (NORVASC) 10 MG tablet Take 10 mg by mouth once daily.    atorvastatin (LIPITOR) 20 MG tablet Take 20 mg by mouth every evening.    carvedilol (COREG) 6.25 MG tablet Take 6.25 mg by mouth 2 (two) times daily.    donepezil (ARICEPT) 5 MG tablet Take 5 mg by mouth every evening.    hydrochlorothiazide (HYDRODIURIL) 25 MG tablet Take 25 mg by mouth once daily.    lisinopril (PRINIVIL,ZESTRIL) 5 MG tablet Take 5 mg by mouth once daily.     Family History     None        Tobacco Use    Smoking status: Current Every Day Smoker     Packs/day: 1.00     Years: 15.00     Pack years: 15.00     Types: Cigarettes    Smokeless tobacco: Never Used   Substance and Sexual Activity    Alcohol use: No    Drug use: No    Sexual activity: Not on file     Review of Systems   Constitutional: Negative.    HENT: Negative.    Respiratory: Positive for cough and shortness of breath.    Cardiovascular: Positive for chest pain (when coughing).   Gastrointestinal: Positive for vomiting.   Endocrine: Negative.    Genitourinary: Negative.    Musculoskeletal: Negative.    Skin: Negative.    Neurological: Negative.    Hematological: Negative.    Psychiatric/Behavioral: Negative.      Objective:     Vital Signs (Most Recent):  Temp: 98.6 °F (37 °C) (01/23/19 1447)  Pulse: 85 (01/23/19 1532)  Resp: (!) 33 (01/23/19 1532)  BP: 138/63 (01/23/19 1532)  SpO2: 100 %  (01/23/19 1232) Vital Signs (24h Range):  Temp:  [98.6 °F (37 °C)-101.4 °F (38.6 °C)] 98.6 °F (37 °C)  Pulse:  [] 85  Resp:  [21-41] 33  SpO2:  [86 %-100 %] 100 %  BP: ()/(52-83) 138/63     Weight: 54.4 kg (120 lb)  Body mass index is 21.95 kg/m².    Physical Exam   Constitutional: She appears well-developed. No distress.   Non toxic appearing   HENT:   Head: Normocephalic and atraumatic.   Mouth/Throat: Oropharynx is clear and moist.   Eyes: Conjunctivae and EOM are normal.   Neck: Normal range of motion. No JVD present.   Cardiovascular: Normal rate and regular rhythm.   Pulmonary/Chest: She has no wheezes. She has no rales.   Breathing comfortably on BiPAP  Speaking full sentences   Abdominal: Soft. Bowel sounds are normal.   Musculoskeletal:   Bilateral BKA   Neurological: She is alert.   Oriented to person, place and situation. Not time   Skin: Skin is warm and dry. She is not diaphoretic.   Psychiatric: She has a normal mood and affect. Her behavior is normal. Judgment and thought content normal.   Nursing note and vitals reviewed.        CRANIAL NERVES     CN III, IV, VI   Extraocular motions are normal.        Significant Labs: All pertinent labs within the past 24 hours have been reviewed.    Significant Imaging: I have reviewed all pertinent imaging results/findings within the past 24 hours.  I have reviewed and interpreted all pertinent imaging results/findings within the past 24 hours.

## 2019-01-23 NOTE — ASSESSMENT & PLAN NOTE
Unsure if true renal failure or side effect from bactrim  Agree with IVFs for now  Repeat Renal panel now to re-check potassium level  Hold off on metformin and ACE-I (pending patient's home med list to confirm she is on these)  Strict I/Os

## 2019-01-23 NOTE — ED PROVIDER NOTES
Encounter Date: 1/23/2019    SCRIBE #1 NOTE: I, Nadeem Ramires, am scribing for, and in the presence of,  Helen Nettles MD. I have scribed the following portions of the note - Other sections scribed: HPI, ROS and PE.       History     Chief Complaint   Patient presents with    Shortness of Breath     called out for SOB that started tonight; pt has hx asthma; O2 sats 90% RA, 100% after breathing tx and 125 solumedrol     CC: Shortness of Breath     HPI: This 69 y.o F smoker (1ppd) with Bilateral BKA, Dementia, DM, and HTN presents to the ED via EMS accompanied by her son c/o SOB since 0100h. Per EMS, the pt's O2 sat was 90% RA on arrival. Her O2 improved to 100% after breathing tx and 125 Solumedrol. The pt's son reports a gradually worsening non-productive cough and SOB since 0100h with associated post-tussive emesis. The pt reports chest pain only when coughing and fatigue. The pt was last seen in this ED 1/14/19 and was Rx'ed Bactrim x2/day x7 days. She was administered Nyquil at 2300h last night. She did not take any medication this AM. Further hx is limited due to dementia.       The history is provided by the patient. No  was used.     Review of patient's allergies indicates:  No Known Allergies  Past Medical History:   Diagnosis Date    Amputee, below knee bilateral    Dementia     Diabetes mellitus     Hypertension      Past Surgical History:   Procedure Laterality Date    bka      cardiac stents      HYSTERECTOMY       History reviewed. No pertinent family history.  Social History     Tobacco Use    Smoking status: Current Every Day Smoker     Packs/day: 1.00     Years: 15.00     Pack years: 15.00     Types: Cigarettes   Substance Use Topics    Alcohol use: No    Drug use: No     Review of Systems   Unable to perform ROS: Dementia   Constitutional: Positive for fatigue.   Respiratory: Positive for cough (non-productive) and shortness of breath.    Cardiovascular: Positive for  chest pain (only when coughing).   Skin: Negative for rash.       Physical Exam     Initial Vitals [01/23/19 0519]   BP Pulse Resp Temp SpO2   (!) 168/73 (!) 120 (!) 22 99.1 °F (37.3 °C) 100 %      MAP       --         Physical Exam    Nursing note and vitals reviewed.  Constitutional: She is not diaphoretic. No distress.   HENT:   Head: Normocephalic and atraumatic.   Mouth/Throat: Oropharynx is clear and moist.   Eyes: EOM are normal. Pupils are equal, round, and reactive to light. No scleral icterus.   Neck: Normal range of motion. Neck supple. No JVD present.   Cardiovascular: Normal rate and regular rhythm.   Pulmonary/Chest: No stridor. No respiratory distress. She has decreased breath sounds in the right upper field, the right middle field and the right lower field. She has wheezes.   Tachypneic   Abdominal: Soft. Bowel sounds are normal. She exhibits no distension. There is no tenderness.   Musculoskeletal: Normal range of motion. She exhibits no edema or tenderness.   Neurological: She is alert and oriented to person, place, and time. She has normal strength. No cranial nerve deficit or sensory deficit.   Skin: Skin is warm and dry.   Psychiatric: She has a normal mood and affect.         ED Course   Procedures  Labs Reviewed   CBC W/ AUTO DIFFERENTIAL - Abnormal; Notable for the following components:       Result Value    RBC 3.77 (*)     Hemoglobin 10.2 (*)     Hematocrit 32.1 (*)     MCHC 31.8 (*)     All other components within normal limits   COMPREHENSIVE METABOLIC PANEL - Abnormal; Notable for the following components:    Sodium 133 (*)     Potassium 6.1 (*)     CO2 20 (*)     Glucose 156 (*)     Creatinine 3.0 (*)     ALT 8 (*)     eGFR if  17 (*)     eGFR if non  15 (*)     All other components within normal limits   TROPONIN I - Abnormal; Notable for the following components:    Troponin I 0.050 (*)     All other components within normal limits   B-TYPE NATRIURETIC  PEPTIDE - Abnormal; Notable for the following components:    BNP 2,868 (*)     All other components within normal limits   URINALYSIS, REFLEX TO URINE CULTURE - Abnormal; Notable for the following components:    Color, UA Red (*)     Appearance, UA Cloudy (*)     Protein, UA 2+ (*)     Glucose, UA 1+ (*)     Occult Blood UA 2+ (*)     Leukocytes, UA 2+ (*)     All other components within normal limits    Narrative:     Preferred Collection Type->Urine, Clean Catch   URINALYSIS MICROSCOPIC - Abnormal; Notable for the following components:    RBC, UA 10 (*)     WBC, UA >100 (*)     Bacteria, UA Moderate (*)     All other components within normal limits    Narrative:     Preferred Collection Type->Urine, Clean Catch   POCT GLUCOSE - Abnormal; Notable for the following components:    POCT Glucose 189 (*)     All other components within normal limits   CULTURE, BLOOD   CULTURE, BLOOD   CULTURE, URINE   LACTIC ACID, PLASMA   MAGNESIUM   TROPONIN I     EKG Readings: (Independently Interpreted)   Initial Reading: No STEMI. Rhythm: Sinus Tachycardia. Heart Rate: 121. Conduction: Normal. ST Segments: Non-Specific ST Segment Depression. Q Waves: I, II, III, V5 and V6.       Imaging Results          X-Ray Chest AP Portable (Final result)  Result time 01/23/19 06:07:28    Final result by Kerrie Dickerson MD (01/23/19 06:07:28)                 Impression:      Cardiomegaly.  No acute intrathoracic abnormality identified on this single radiographic view of the chest.      Electronically signed by: Kerrie Dickerson MD  Date:    01/23/2019  Time:    06:07             Narrative:    EXAMINATION:  XR CHEST AP PORTABLE    CLINICAL HISTORY:  Chest Pain;    TECHNIQUE:  Single frontal view of the chest was performed.    COMPARISON:  Chest radiograph 08/09/2013    FINDINGS:  Monitoring leads overlie the chest.  The cardiomediastinal silhouette appears to be prominent in size.  The visualized airway is unremarkable.  The lungs appear  symmetrically aerated without definite focal alveolar consolidation. No large pleural effusion or pneumothorax is appreciated.Visualized osseous structures demonstrate no acute abnormality.                              X-Rays:   Independently Interpreted Readings:   Chest X-Ray: No infiltrates.  No acute abnormalities.     Medical Decision Making:   History:   Old Medical Records: I decided to obtain old medical records.  Old Records Summarized: other records.       <> Summary of Records: Diagnosed with UTI 1/14/19  Differential Diagnosis:   COPD exacerbation  Pneumonia  Aspiration  CHF exacerbation  ACS  UTI    Independently Interpreted Test(s):   I have ordered and independently interpreted X-rays - see prior notes.  I have ordered and independently interpreted EKG Reading(s) - see prior notes  Clinical Tests:   Lab Tests: Ordered and Reviewed  Radiological Study: Ordered and Reviewed  Medical Tests: Ordered and Reviewed  ED Management:  Patient afebrile and in no acute distress at time history and physical.  Patient noted to be tachycardic and mildly tachypneic.  Patient has not taken any of her a.m. medications at time history and physical.  EKG with inferior and lateral T-wave inversions appear new compared to prior.  Patient given aspirin.  Labs notable for hyperkalemia and acute kidney injury. Patient given IV hydration, calcium gluconate, albuterol nebs.  She is to be admitted to Medicine for further management of hyperkalemia and renal failure.    I spent a total of 60 minutes caring for and overseeing the critical care of this patient. Critical care time was spent treating or preventing major adverse outcome resulting from hyperkalemia, renal failure.  Patient was specifically observed and treated with IV hydration, EKG, interpretation of EKG, calcium gluconate, albuterol, Kayexalate, followed by discussion with hospitalist and admission to the hospital.       Addendum:  While awaiting transfer to her  inpatient bed patient had an episode where she became diaphoretic and agitated.  Repeat EKG did not have definite acute ischemic changes.  I-STAT Chem 8 and VBG ordered.  Patient markedly acidotic at 7.016 is, and potassium and I status 7.2.  Patient given additional calcium gluconate as well as insulin, dextrose, bicarb.  Case discussed with Dr. Jean who recommended observing patient in the ED while awaiting repeat lactic acid and determining the patient is stable. Repeat lactic acid trended up to 2.3.  Case discussed with Dr. Valdes of ICU who recommends placing patient on BiPAP and performing an ABG in 1 hr to determine if acidosis has improved.  ABG after BiPAP has some improvement in pH to 7.14.  Repeat lab findings discussed with Dr. Lentz who recommends admission to ICU.  This chart was completed using dictation software, as a result there may be some typographical errors.             Scribe Attestation:   Scribe #1: I performed the above scribed service and the documentation accurately describes the services I performed. I attest to the accuracy of the note.    Attending Attestation:           Physician Attestation for Scribe:  Physician Attestation Statement for Scribe #1: I, Helen Nettles MD, reviewed documentation, as scribed by Nadeem Ramires in my presence, and it is both accurate and complete.                    Clinical Impression:   The primary encounter diagnosis was Hyperkalemia. Diagnoses of Chest pain and REGLA (acute kidney injury) were also pertinent to this visit.      Disposition:   Disposition: Admitted  Condition: Serious                        Helen Nettles MD  01/23/19 0904       Helen Nettles MD  01/23/19 1431

## 2019-01-23 NOTE — H&P
[facility] Hospital Medicine  History & Physical    Patient Name: Zakiya Garcia  MRN: [id number]  Admission Date: 1/23/2019  Attending Physician: Heeln Nettles MD   Primary Care Provider: Ines Barragan MD         Patient information was obtained from patient, relative(s), past medical records and ER records.     Subjective:     Principal Problem:Acute respiratory acidosis    Chief Complaint:   Chief Complaint   Patient presents with    Shortness of Breath     called out for SOB that started tonight; pt has hx asthma; O2 sats 90% RA, 100% after breathing tx and 125 solumedrol        HPI: 69 year old female with hypertension, dementia, s/p dorota BKA, hyperlipidemia, smoker and dementia who presented with shortness of breath. Per EMS, patient's sats were 90% at room air then 100% after breathing treatment and a dose of solumedrol 125 mg IM. Patient's son reported gradual worsening of non productive cough as well as emesis after coughing this AM. Chest pain when coughing. Patient was last seen in the ED on 1/14 (~10 days PTA) for a UTI. Was prescribed bactrim BID x 7 days. Patient is not sure if still taking metformin or lisinopril. Son will bring list of meds.     In the ED, patient was tachycardic, with low grade fever, with tachypnea and sat 92% at room air. Lungs clear to auscultation and XRay without evidence of consolidation or edema. Labs showed moderate hyperkalemia, acute renal failure and elevated lactic acid. ABG showed pH of 7.0 with hypercapnia. Patient admitted to ICU for close monitoring given such significant metabolic derangements.     Past Medical History:   Diagnosis Date    Amputee, below knee bilateral    Cigarette smoker     Dementia     Diabetes mellitus     Hypertension     Requires assistance with activities of daily living (ADL)     Wheelchair dependent        Past Surgical History:   Procedure Laterality Date    bka      cardiac stents      HYSTERECTOMY          Review of patient's allergies indicates:  No Known Allergies    No current facility-administered medications on file prior to encounter.      Current Outpatient Medications on File Prior to Encounter   Medication Sig    amlodipine (NORVASC) 10 MG tablet Take 10 mg by mouth once daily.    atorvastatin (LIPITOR) 20 MG tablet Take 20 mg by mouth every evening.    carvedilol (COREG) 6.25 MG tablet Take 6.25 mg by mouth 2 (two) times daily.    donepezil (ARICEPT) 5 MG tablet Take 5 mg by mouth every evening.    hydrochlorothiazide (HYDRODIURIL) 25 MG tablet Take 25 mg by mouth once daily.    lisinopril (PRINIVIL,ZESTRIL) 5 MG tablet Take 5 mg by mouth once daily.     Family History     None        Tobacco Use    Smoking status: Current Every Day Smoker     Packs/day: 1.00     Years: 15.00     Pack years: 15.00     Types: Cigarettes    Smokeless tobacco: Never Used   Substance and Sexual Activity    Alcohol use: No    Drug use: No    Sexual activity: Not on file     Review of Systems   Constitutional: Negative.    HENT: Negative.    Respiratory: Positive for cough and shortness of breath.    Cardiovascular: Positive for chest pain (when coughing).   Gastrointestinal: Positive for vomiting.   Endocrine: Negative.    Genitourinary: Negative.    Musculoskeletal: Negative.    Skin: Negative.    Neurological: Negative.    Hematological: Negative.    Psychiatric/Behavioral: Negative.      Objective:     Vital Signs (Most Recent):  Temp: 98.6 °F (37 °C) (01/23/19 1447)  Pulse: 85 (01/23/19 1532)  Resp: (!) 33 (01/23/19 1532)  BP: 138/63 (01/23/19 1532)  SpO2: 100 % (01/23/19 1532) Vital Signs (24h Range):  Temp:  [98.6 °F (37 °C)-101.4 °F (38.6 °C)] 98.6 °F (37 °C)  Pulse:  [] 85  Resp:  [21-41] 33  SpO2:  [86 %-100 %] 100 %  BP: ()/(52-83) 138/63     Weight: 54.4 kg (120 lb)  Body mass index is 21.95 kg/m².    Physical Exam   Constitutional: She appears well-developed. No distress.   Non toxic  appearing   HENT:   Head: Normocephalic and atraumatic.   Mouth/Throat: Oropharynx is clear and moist.   Eyes: Conjunctivae and EOM are normal.   Neck: Normal range of motion. No JVD present.   Cardiovascular: Normal rate and regular rhythm.   Pulmonary/Chest: She has no wheezes. She has no rales.   Breathing comfortably on BiPAP  Speaking full sentences   Abdominal: Soft. Bowel sounds are normal.   Musculoskeletal:   Bilateral BKA   Neurological: She is alert.   Oriented to person, place and situation. Not time   Skin: Skin is warm and dry. She is not diaphoretic.   Psychiatric: She has a normal mood and affect. Her behavior is normal. Judgment and thought content normal.   Nursing note and vitals reviewed.        CRANIAL NERVES     CN III, IV, VI   Extraocular motions are normal.        Significant Labs: All pertinent labs within the past 24 hours have been reviewed.    Significant Imaging: I have reviewed all pertinent imaging results/findings within the past 24 hours.  I have reviewed and interpreted all pertinent imaging results/findings within the past 24 hours.    Assessment/Plan:     * Acute respiratory acidosis    With hypercapnia and pH of 7  Surprisingly patient is very alert and able to hold a conversation while on BiPAP  Repeat ABG with not much improvement after being on BiPAP for about 1.5 hours  However, she was only on 10/5, which is not sufficient for hypercapnia  Will increase IPAP to 12 and repeat ABG in 1 hour  O2 sats adequate on low requirements. Good respiratory effort  COPD??         Acute respiratory failure with hypoxia and hypercapnia    As above       Hyperkalemia, diminished renal excretion    2/2 to bactrim vs renal failure vs both  No peaked T wave or QRS widening or P wave flat  Treated in the ED  Repeat BMP now       Acute renal failure    Unsure if true renal failure or side effect from bactrim  Agree with IVFs for now  Repeat Renal panel now to re-check potassium level  Hold off  on metformin and ACE-I (pending patient's home med list to confirm she is on these)  Strict I/Os       Urinary tract infection    Abnormal urinalysis  UCx pending  Agree with empiric ceftriaxone       Diabetes mellitus type 2 in nonobese    Hold metformin and start insulin  Adjust as needed for goal -180       Hypertension             VTE Risk Mitigation (From admission, onward)    None             Shyann Lentz MD  Department of Hospital Medicine   [facility]

## 2019-01-23 NOTE — CONSULTS
69 year old female with hypertension, dementia, s/p dorota BKA, hyperlipidemia, smoker and dementia who presented with shortness of breath. We were consulted for hypercapnea. She was seen in ICU. She is awake, alert and oriented wearing BIPAP. Adjusted BIPAP settings to 15/5 30%. Lung auscultation with bilateral crackles at the bases and wheezes noted to RLL.    Respiratory acidosis:  -wear BIPAP overnight  -Follow symptoms and alertness, not ABG  -continue Duo-Nebs q4    Thank you for the consult. We will follow up with the patient with full consult note to follow in am.    Critical Care time 40 minutes.    Above discussed with Dr. Barrera Madsen, NP  Pulmonary/Critical Care Medicine

## 2019-01-23 NOTE — ASSESSMENT & PLAN NOTE
With hypercapnia and pH of 7  Surprisingly patient is very alert and able to hold a conversation while on BiPAP  Repeat ABG with not much improvement after being on BiPAP for about 1.5 hours  However, she was only on 10/5, which is not sufficient for hypercapnia  Will increase IPAP to 12 and repeat ABG in 1 hour  O2 sats adequate on low requirements. Good respiratory effort  COPD??

## 2019-01-23 NOTE — PROGRESS NOTES
Results for MEHRAN WHITTINGTON (MRN [id number]) as of 1/23/2019 14:10   Ref. Range 1/23/2019 14:01   POC PH Latest Ref Range: 7.35 - 7.45  7.143 (LL)   POC PCO2 Latest Ref Range: 35 - 45 mmHg 57.9 (HH)   POC PO2 Latest Ref Range: 80 - 100 mmHg 137 (H)   POC BE Latest Ref Range: -2 to 2 mmol/L -9   POC HCO3 Latest Ref Range: 24 - 28 mmol/L 19.8 (L)   POC SATURATED O2 Latest Ref Range: 95 - 100 % 98   POC TCO2 Latest Ref Range: 23 - 27 mmol/L 22 (L)   FiO2 Unknown 40   Sample Unknown ARTERIAL   DelSys Unknown CPAP/BiPAP   Allens Test Unknown Pass   Site Unknown RB   Mode Unknown BiPAP   Rate Unknown 12

## 2019-01-23 NOTE — HPI
69 year old female with hypertension, dementia, s/p dorota BKA, hyperlipidemia, smoker and dementia who presented with shortness of breath. Per EMS, patient's sats were 90% at room air then 100% after breathing treatment and a dose of solumedrol 125 mg IM. Patient's son reported gradual worsening of non productive cough as well as emesis after coughing this AM. Chest pain when coughing. Patient was last seen in the ED on 1/14 (~10 days PTA) for a UTI. Was prescribed bactrim BID x 7 days. Patient is not sure if still taking metformin or lisinopril. Son will bring list of meds.     In the ED, patient was tachycardic, with low grade fever, with tachypnea and sat 92% at room air. Lungs clear to auscultation and XRay without evidence of consolidation or edema. Labs showed moderate hyperkalemia, acute renal failure and elevated lactic acid. ABG showed pH of 7.0 with hypercapnia. Patient admitted to ICU for close monitoring given such significant metabolic derangements.

## 2019-01-23 NOTE — PROGRESS NOTES
Results for MEHRAN WHITTINGTON (MRN [id number]) as of 1/23/2019 10:52   Ref. Range 1/23/2019 10:45   POC PH Latest Ref Range: 7.35 - 7.45  7.016 (L)   POC PCO2 Latest Ref Range: 35 - 45 mmHg 74.4 (H)   POC PO2 Latest Ref Range: 40 - 60 mmHg 21 (LL)   POC BE Latest Ref Range: -2 to 2 mmol/L -12   POC HCO3 Latest Ref Range: 24 - 28 mmol/L 19.1 (L)   POC SATURATED O2 Latest Ref Range: 95 - 100 % 17 (L)   POC TCO2 Latest Ref Range: 24 - 29 mmol/L 21 (L)   Flow Unknown 2   Sample Unknown VENOUS   DelSys Unknown Nasal Can   Allens Test Unknown N/A   Site Unknown Other   Mode Unknown SPONT

## 2019-01-23 NOTE — ED TRIAGE NOTES
Per patients son patient has been experiencing SOB since 0200. Patient report palpitations but denies chest pain. Patient received breathing treatment by ems in route to hospital.

## 2019-01-23 NOTE — ASSESSMENT & PLAN NOTE
2/2 to bactrim vs renal failure vs both  No peaked T wave or QRS widening or P wave flat  Treated in the ED  Repeat BMP now

## 2019-01-23 NOTE — PROGRESS NOTES
Results for MEHRAN WHITTINGTON (MRN [id number]) as of 1/23/2019 10:43   Ref. Range 1/23/2019 10:33   POC Glucose Latest Ref Range: 70 - 110 mg/dL 184 (H)   POC Creatinine Latest Ref Range: 0.5 - 1.4 mg/dL 2.6 (H)   POC BUN Latest Ref Range: 6 - 30 mg/dL 24   POC Chloride Latest Ref Range: 95 - 110 mmol/L 106   POC Sodium Latest Ref Range: 136 - 145 mmol/L 134 (L)   POC Potassium Latest Ref Range: 3.5 - 5.1 mmol/L 7.2 (HH)   POC Ionized Calcium Latest Ref Range: 1.06 - 1.42 mmol/L 1.20   POC Hematocrit Latest Ref Range: 36 - 54 %PCV 32 (L)   POC TCO2 (MEASURED) Latest Ref Range: 23 - 29 mmol/L 22 (L)   Sample Unknown VENOUS   POC Anion Gap Latest Ref Range: 8 - 16 mmol/L 15

## 2019-01-24 PROBLEM — F17.200 TOBACCO USE DISORDER, SEVERE, DEPENDENCE: Status: ACTIVE | Noted: 2019-01-24

## 2019-01-24 PROBLEM — I50.41 ACUTE COMBINED SYSTOLIC AND DIASTOLIC HEART FAILURE: Status: ACTIVE | Noted: 2019-01-24

## 2019-01-24 PROBLEM — I27.20 PULMONARY HYPERTENSION: Status: ACTIVE | Noted: 2019-01-24

## 2019-01-24 PROBLEM — I21.4 NSTEMI (NON-ST ELEVATED MYOCARDIAL INFARCTION): Status: ACTIVE | Noted: 2019-01-24

## 2019-01-24 LAB
ALBUMIN SERPL BCP-MCNC: 2.9 G/DL
ANION GAP SERPL CALC-SCNC: 10 MMOL/L
ANION GAP SERPL CALC-SCNC: 8 MMOL/L
BUN SERPL-MCNC: 29 MG/DL
BUN SERPL-MCNC: 31 MG/DL
CALCIUM SERPL-MCNC: 7.8 MG/DL
CALCIUM SERPL-MCNC: 8.1 MG/DL
CHLORIDE SERPL-SCNC: 111 MMOL/L
CHLORIDE SERPL-SCNC: 112 MMOL/L
CO2 SERPL-SCNC: 19 MMOL/L
CO2 SERPL-SCNC: 20 MMOL/L
CREAT SERPL-MCNC: 2.5 MG/DL
CREAT SERPL-MCNC: 2.5 MG/DL
EST. GFR  (AFRICAN AMERICAN): 22 ML/MIN/1.73 M^2
EST. GFR  (AFRICAN AMERICAN): 22 ML/MIN/1.73 M^2
EST. GFR  (NON AFRICAN AMERICAN): 19 ML/MIN/1.73 M^2
EST. GFR  (NON AFRICAN AMERICAN): 19 ML/MIN/1.73 M^2
ESTIMATED AVG GLUCOSE: 105 MG/DL
GLUCOSE SERPL-MCNC: 106 MG/DL
GLUCOSE SERPL-MCNC: 109 MG/DL
HBA1C MFR BLD HPLC: 5.3 %
PHOSPHATE SERPL-MCNC: 5.7 MG/DL
POCT GLUCOSE: 100 MG/DL (ref 70–110)
POCT GLUCOSE: 113 MG/DL (ref 70–110)
POCT GLUCOSE: 133 MG/DL (ref 70–110)
POCT GLUCOSE: 175 MG/DL (ref 70–110)
POTASSIUM SERPL-SCNC: 5.5 MMOL/L
POTASSIUM SERPL-SCNC: 6.1 MMOL/L
SODIUM SERPL-SCNC: 139 MMOL/L
SODIUM SERPL-SCNC: 141 MMOL/L
TROPONIN I SERPL DL<=0.01 NG/ML-MCNC: 2.09 NG/ML

## 2019-01-24 PROCEDURE — 99291 CRITICAL CARE FIRST HOUR: CPT | Mod: ,,, | Performed by: NURSE PRACTITIONER

## 2019-01-24 PROCEDURE — 94761 N-INVAS EAR/PLS OXIMETRY MLT: CPT

## 2019-01-24 PROCEDURE — 36415 COLL VENOUS BLD VENIPUNCTURE: CPT

## 2019-01-24 PROCEDURE — 99292 CRITICAL CARE ADDL 30 MIN: CPT | Mod: ,,, | Performed by: INTERNAL MEDICINE

## 2019-01-24 PROCEDURE — G8996 SWALLOW CURRENT STATUS: HCPCS | Mod: CI

## 2019-01-24 PROCEDURE — 94640 AIRWAY INHALATION TREATMENT: CPT

## 2019-01-24 PROCEDURE — 63600175 PHARM REV CODE 636 W HCPCS: Performed by: EMERGENCY MEDICINE

## 2019-01-24 PROCEDURE — 99292 PR CRITICAL CARE, ADDL 30 MIN: ICD-10-PCS | Mod: ,,, | Performed by: INTERNAL MEDICINE

## 2019-01-24 PROCEDURE — 84484 ASSAY OF TROPONIN QUANT: CPT

## 2019-01-24 PROCEDURE — 25000003 PHARM REV CODE 250: Performed by: INTERNAL MEDICINE

## 2019-01-24 PROCEDURE — 20000000 HC ICU ROOM

## 2019-01-24 PROCEDURE — 27000221 HC OXYGEN, UP TO 24 HOURS

## 2019-01-24 PROCEDURE — 99291 PR CRITICAL CARE, E/M 30-74 MINUTES: ICD-10-PCS | Mod: ,,, | Performed by: INTERNAL MEDICINE

## 2019-01-24 PROCEDURE — 25000003 PHARM REV CODE 250: Performed by: EMERGENCY MEDICINE

## 2019-01-24 PROCEDURE — 99291 PR CRITICAL CARE, E/M 30-74 MINUTES: ICD-10-PCS | Mod: ,,, | Performed by: NURSE PRACTITIONER

## 2019-01-24 PROCEDURE — 63600175 PHARM REV CODE 636 W HCPCS: Performed by: INTERNAL MEDICINE

## 2019-01-24 PROCEDURE — 92610 EVALUATE SWALLOWING FUNCTION: CPT

## 2019-01-24 PROCEDURE — 80069 RENAL FUNCTION PANEL: CPT

## 2019-01-24 PROCEDURE — 99900035 HC TECH TIME PER 15 MIN (STAT)

## 2019-01-24 PROCEDURE — 25000242 PHARM REV CODE 250 ALT 637 W/ HCPCS: Performed by: INTERNAL MEDICINE

## 2019-01-24 PROCEDURE — 99291 CRITICAL CARE FIRST HOUR: CPT | Mod: ,,, | Performed by: INTERNAL MEDICINE

## 2019-01-24 PROCEDURE — 83036 HEMOGLOBIN GLYCOSYLATED A1C: CPT

## 2019-01-24 PROCEDURE — 94660 CPAP INITIATION&MGMT: CPT

## 2019-01-24 PROCEDURE — 80048 BASIC METABOLIC PNL TOTAL CA: CPT

## 2019-01-24 PROCEDURE — G8997 SWALLOW GOAL STATUS: HCPCS | Mod: CI

## 2019-01-24 RX ORDER — ENOXAPARIN SODIUM 100 MG/ML
20 INJECTION SUBCUTANEOUS ONCE
Status: COMPLETED | OUTPATIENT
Start: 2019-01-24 | End: 2019-01-24

## 2019-01-24 RX ORDER — ENOXAPARIN SODIUM 100 MG/ML
1 INJECTION SUBCUTANEOUS EVERY 24 HOURS
Status: DISCONTINUED | OUTPATIENT
Start: 2019-01-25 | End: 2019-01-27

## 2019-01-24 RX ORDER — CARVEDILOL 6.25 MG/1
25 TABLET ORAL 2 TIMES DAILY
Status: DISCONTINUED | OUTPATIENT
Start: 2019-01-24 | End: 2019-01-25

## 2019-01-24 RX ORDER — CLOPIDOGREL BISULFATE 75 MG/1
300 TABLET ORAL ONCE
Status: COMPLETED | OUTPATIENT
Start: 2019-01-24 | End: 2019-01-24

## 2019-01-24 RX ORDER — FUROSEMIDE 10 MG/ML
40 INJECTION INTRAMUSCULAR; INTRAVENOUS 2 TIMES DAILY
Status: DISCONTINUED | OUTPATIENT
Start: 2019-01-24 | End: 2019-01-25

## 2019-01-24 RX ORDER — ISOSORBIDE MONONITRATE 30 MG/1
30 TABLET, EXTENDED RELEASE ORAL DAILY
Status: DISCONTINUED | OUTPATIENT
Start: 2019-01-25 | End: 2019-01-25

## 2019-01-24 RX ORDER — HYDRALAZINE HYDROCHLORIDE 25 MG/1
25 TABLET, FILM COATED ORAL EVERY 8 HOURS
Status: DISCONTINUED | OUTPATIENT
Start: 2019-01-24 | End: 2019-01-25

## 2019-01-24 RX ORDER — HYDRALAZINE HYDROCHLORIDE 20 MG/ML
10 INJECTION INTRAMUSCULAR; INTRAVENOUS ONCE
Status: COMPLETED | OUTPATIENT
Start: 2019-01-24 | End: 2019-01-24

## 2019-01-24 RX ORDER — CLOPIDOGREL BISULFATE 75 MG/1
75 TABLET ORAL DAILY
Status: DISCONTINUED | OUTPATIENT
Start: 2019-01-25 | End: 2019-01-25

## 2019-01-24 RX ORDER — ASPIRIN 81 MG/1
81 TABLET ORAL DAILY
Status: DISCONTINUED | OUTPATIENT
Start: 2019-01-25 | End: 2019-01-25

## 2019-01-24 RX ADMIN — CLOPIDOGREL BISULFATE 300 MG: 75 TABLET ORAL at 05:01

## 2019-01-24 RX ADMIN — ENOXAPARIN SODIUM 30 MG: 100 INJECTION SUBCUTANEOUS at 04:01

## 2019-01-24 RX ADMIN — IPRATROPIUM BROMIDE AND ALBUTEROL SULFATE 3 ML: .5; 3 SOLUTION RESPIRATORY (INHALATION) at 07:01

## 2019-01-24 RX ADMIN — AMLODIPINE BESYLATE 10 MG: 5 TABLET ORAL at 08:01

## 2019-01-24 RX ADMIN — CEFTRIAXONE 1 G: 1 INJECTION, SOLUTION INTRAVENOUS at 08:01

## 2019-01-24 RX ADMIN — SODIUM POLYSTYRENE SULFONATE 15 G: 15 SUSPENSION ORAL; RECTAL at 09:01

## 2019-01-24 RX ADMIN — DONEPEZIL HYDROCHLORIDE 5 MG: 5 TABLET, FILM COATED ORAL at 09:01

## 2019-01-24 RX ADMIN — HYDRALAZINE HYDROCHLORIDE 25 MG: 25 TABLET ORAL at 09:01

## 2019-01-24 RX ADMIN — SODIUM POLYSTYRENE SULFONATE 15 G: 15 SUSPENSION ORAL; RECTAL at 08:01

## 2019-01-24 RX ADMIN — IPRATROPIUM BROMIDE AND ALBUTEROL SULFATE 3 ML: .5; 3 SOLUTION RESPIRATORY (INHALATION) at 04:01

## 2019-01-24 RX ADMIN — ATORVASTATIN CALCIUM 20 MG: 10 TABLET, FILM COATED ORAL at 09:01

## 2019-01-24 RX ADMIN — CARVEDILOL 25 MG: 6.25 TABLET, FILM COATED ORAL at 09:01

## 2019-01-24 RX ADMIN — FUROSEMIDE 40 MG: 10 INJECTION, SOLUTION INTRAVENOUS at 05:01

## 2019-01-24 RX ADMIN — IPRATROPIUM BROMIDE AND ALBUTEROL SULFATE 3 ML: .5; 3 SOLUTION RESPIRATORY (INHALATION) at 11:01

## 2019-01-24 RX ADMIN — SODIUM CHLORIDE: 0.9 INJECTION, SOLUTION INTRAVENOUS at 02:01

## 2019-01-24 RX ADMIN — SODIUM BICARBONATE: 84 INJECTION, SOLUTION INTRAVENOUS at 08:01

## 2019-01-24 RX ADMIN — HYDRALAZINE HYDROCHLORIDE 10 MG: 20 INJECTION INTRAMUSCULAR; INTRAVENOUS at 05:01

## 2019-01-24 RX ADMIN — ENOXAPARIN SODIUM 20 MG: 100 INJECTION SUBCUTANEOUS at 05:01

## 2019-01-24 RX ADMIN — CARVEDILOL 6.25 MG: 6.25 TABLET, FILM COATED ORAL at 08:01

## 2019-01-24 NOTE — ASSESSMENT & PLAN NOTE
EF 25% with inferoposterior WMA suggestive of prior CAD  She seems vol overloaded  Suggest IV lasix and afterload reduction (Start Hydrala/Imdur)  Cont coreg  Stop amlod in favor of hydrala/Imdur  Cont ASA/statin +/- plavix  Given her current MS and ARF, she is not a candidate for cath lab at the present time.  If her MS were to clear and renal fxn improve, we could consider it.

## 2019-01-24 NOTE — ASSESSMENT & PLAN NOTE
Unsure if true renal failure or side effect from bactrim  Hold off on metformin   Stop fluids as patient is developing pulm edema  BMP in AM  Strict I/Os

## 2019-01-24 NOTE — SUBJECTIVE & OBJECTIVE
Review of Systems   Constitutional: Negative for chills and fever.   HENT: Negative for sore throat and trouble swallowing.    Eyes: Negative for discharge and visual disturbance.   Respiratory: Positive for cough. Negative for shortness of breath.    Cardiovascular: Negative for chest pain and palpitations.   Gastrointestinal: Negative for abdominal distention and abdominal pain.   Endocrine: Negative for polydipsia and polyuria.   Genitourinary: Negative for frequency and hematuria.   Musculoskeletal: Negative for joint swelling and myalgias.   Skin: Negative for color change and rash.   Neurological: Negative for dizziness and headaches.   Hematological: Negative for adenopathy. Does not bruise/bleed easily.     Objective:     Vital Signs (Most Recent):  Temp: 98.4 °F (36.9 °C) (01/24/19 0700)  Pulse: 93 (01/24/19 0830)  Resp: (!) 24 (01/24/19 0830)  BP: (!) 167/78 (01/24/19 0830)  SpO2: (!) 88 % (01/24/19 0830) Vital Signs (24h Range):  Temp:  [97.1 °F (36.2 °C)-100.3 °F (37.9 °C)] 98.4 °F (36.9 °C)  Pulse:  [] 93  Resp:  [21-65] 24  SpO2:  [84 %-100 %] 88 %  BP: ()/(52-97) 167/78   Weight: 49.4 kg (108 lb 14.5 oz)  Body mass index is 19.92 kg/m².      Intake/Output Summary (Last 24 hours) at 1/24/2019 0908  Last data filed at 1/24/2019 0835  Gross per 24 hour   Intake 2940 ml   Output --   Net 2940 ml       Physical Exam   Constitutional: She is oriented to person, place, and time. Vital signs are normal. She appears well-developed. She is cooperative. She has a sickly appearance. No distress. Face mask in place.   HENT:   Head: Normocephalic and atraumatic.   Eyes: Conjunctivae are normal. Pupils are equal, round, and reactive to light. Right eye exhibits no exudate. Left eye exhibits no exudate. Right conjunctiva has no hemorrhage. Left conjunctiva has no hemorrhage. Right eye exhibits no nystagmus. Left eye exhibits no nystagmus.   Neck: Trachea normal. No neck rigidity. No tracheal deviation  present.   Cardiovascular: Normal rate, regular rhythm and normal heart sounds. PMI is not displaced. Exam reveals no gallop and no friction rub.   No murmur heard.  Pulses:       Radial pulses are 2+ on the right side, and 2+ on the left side.        Dorsalis pedis pulses are 2+ on the right side, and 2+ on the left side.   Pulmonary/Chest: Effort normal. No accessory muscle usage. No respiratory distress. She has wheezes in the right lower field. She has rales in the right lower field and the left lower field.   Abdominal: Soft. Normal appearance and bowel sounds are normal. There is no tenderness.   Musculoskeletal: Normal range of motion.   Neurological: She is alert and oriented to person, place, and time. No cranial nerve deficit or sensory deficit. GCS eye subscore is 4. GCS verbal subscore is 5. GCS motor subscore is 6.   MARTINEZ 4/5, no neuro defts appreciated   Skin: Skin is warm and dry. Capillary refill takes 2 to 3 seconds. She is not diaphoretic. No cyanosis. Nails show no clubbing.   Nursing note and vitals reviewed.      Vents:  Oxygen Concentration (%): 30 (01/24/19 0722)  Lines/Drains/Airways     Peripheral Intravenous Line                 Peripheral IV - Single Lumen 01/23/19 0522 Left Forearm 1 day         Peripheral IV - Single Lumen 01/23/19 1230 Right Forearm less than 1 day              Significant Labs:    CBC/Anemia Profile:  Recent Labs   Lab 01/23/19  0545 01/23/19  1033   WBC 7.61  --    HGB 10.2*  --    HCT 32.1* 32*     --    MCV 85  --    RDW 14.3  --         Chemistries:  Recent Labs   Lab 01/23/19  0545 01/23/19  0700 01/23/19  1636 01/23/19  2019 01/24/19  0353   *  --  134* 139 139   K 6.1*  --  6.6* 5.9* 6.1*     --  108 108 111*   CO2 20*  --  21* 23 20*   BUN 23  --  26* 28* 29*   CREATININE 3.0*  --  2.7* 2.6* 2.5*   CALCIUM 8.8  --  8.3* 8.0* 8.1*   ALBUMIN 3.6  --   --   --  2.9*   PROT 7.2  --   --   --   --    BILITOT 0.2  --   --   --   --    ALKPHOS 111   --   --   --   --    ALT 8*  --   --   --   --    AST 19  --   --   --   --    MG  --  1.8  --   --   --    PHOS  --   --   --   --  5.7*       All pertinent labs within the past 24 hours have been reviewed.    Significant Imaging:  I have reviewed all pertinent imaging results/findings within the past 24 hours.

## 2019-01-24 NOTE — PROGRESS NOTES
[facility] Hospital Medicine  Progress Note    Patient Name: Zakiya Garcia  MRN: [id number]  Patient Class: IP- Inpatient   Admission Date: 1/23/2019  Length of Stay: 1 days  Attending Physician: Shyann Valdes MD  Primary Care Provider: Ines Barragan MD        Subjective:     Principal Problem:Acute respiratory acidosis    HPI:  69 year old female with hypertension, dementia, s/p dorota BKA, hyperlipidemia, smoker and dementia who presented with shortness of breath. Per EMS, patient's sats were 90% at room air then 100% after breathing treatment and a dose of solumedrol 125 mg IM. Patient's son reported gradual worsening of non productive cough as well as emesis after coughing this AM. Chest pain when coughing. Patient was last seen in the ED on 1/14 (~10 days PTA) for a UTI. Was prescribed bactrim BID x 7 days. Patient is not sure if still taking metformin or lisinopril. Son will bring list of meds.     In the ED, patient was tachycardic, with low grade fever, with tachypnea and sat 92% at room air. Lungs clear to auscultation and XRay without evidence of consolidation or edema. Labs showed moderate hyperkalemia, acute renal failure and elevated lactic acid. ABG showed pH of 7.0 with hypercapnia. Patient admitted to ICU for close monitoring given such significant metabolic derangements.     Hospital Course:  No notes on file    Interval History: Echo with LVEF of 25% with wall motion abnormality. Abnormal EKG with trop of 2. Patient denies ever having chest pain. Son states all she does is smoke all day, drink coke and take goody powder. Apparently been feeling bad for days. Son states she had a coronary stent placed 6 years ago but no known heart failure.     Review of Systems   Respiratory: Negative.    Cardiovascular: Negative.    Gastrointestinal: Negative.      Objective:     Vital Signs (Most Recent):  Temp: 99.5 °F (37.5 °C) (01/24/19 1500)  Pulse: 104 (01/24/19 1612)  Resp: (!) 26  (01/24/19 1612)  BP: (!) 169/72 (01/24/19 1600)  SpO2: 100 % (01/24/19 1612) Vital Signs (24h Range):  Temp:  [98.4 °F (36.9 °C)-99.5 °F (37.5 °C)] 99.5 °F (37.5 °C)  Pulse:  [] 104  Resp:  [20-65] 26  SpO2:  [84 %-100 %] 100 %  BP: (138-180)/(59-97) 169/72     Weight: 49.4 kg (108 lb 14.5 oz)  Body mass index is 19.92 kg/m².    Intake/Output Summary (Last 24 hours) at 1/24/2019 1652  Last data filed at 1/24/2019 1458  Gross per 24 hour   Intake 2183.33 ml   Output 150 ml   Net 2033.33 ml      Physical Exam   Constitutional: She appears well-developed. No distress.   Cardiovascular: Normal rate and regular rhythm.   Pulmonary/Chest: She has wheezes. She has no rales.   Using accessory muscles when speaking  Speaking short sentences but states she is feeling ok   Abdominal: Soft. Bowel sounds are normal.   Musculoskeletal: She exhibits no edema.   Bilateral BKA   Neurological:   drowsy   Skin: Skin is warm. She is not diaphoretic.   Nursing note and vitals reviewed.      Significant Labs: All pertinent labs within the past 24 hours have been reviewed.    Significant Imaging: I have reviewed all pertinent imaging results/findings within the past 24 hours.  I have reviewed and interpreted all pertinent imaging results/findings within the past 24 hours.    Assessment/Plan:      * Acute respiratory acidosis    With hypercapnia and pH of 7 slightly improved with BiPAP  Now on NC  Workup thus far revealed new severely depressed LVEF to 25% with inferoposterior WMA  Trop initially mildly elevated to 0.089 now 2. Patient is chest pain free  Unknown how long EF has been depressed but per son her SOB has been present for days  Has had a coronary stent placed 6 years ago, per son. On ASA daily but smokes cigarettes continuously and follows a high salt diet.   Currently not a good candidate for cardiac catheterization  Will treat medically and consult Cardiology  Stop IVFs! Consider diuresis if resp status worsens          Acute combined systolic and diastolic heart failure    As above       Acute respiratory failure with hypoxia and hypercapnia    As above       Hyperkalemia, diminished renal excretion    2/2 to bactrim vs renal failure vs both  No peaked T wave or QRS widening or P wave flat  Is now trending downwards  Cardiac monitoring  BMP in AM       NSTEMI (non-ST elevated myocardial infarction)    As above       Acute renal failure    Unsure if true renal failure or side effect from bactrim  Hold off on metformin   Stop fluids as patient is developing pulm edema  BMP in AM  Strict I/Os       Urinary tract infection    Abnormal urinalysis  UCx so far now growth  Agree with empiric ceftriaxone       Tobacco use disorder, severe, dependence    Smoking all day long  Spent > 10 minutes discussing tobacco cessation  Patient does not appear to be motivated. Not interested on nicotine patch       Diabetes mellitus type 2 in nonobese    Hold metformin and start insulin  Adjust as needed for goal -180       Hypertension    Not well controlled. Adjust BP meds         VTE Risk Mitigation (From admission, onward)        Ordered     enoxaparin injection 50 mg  Daily      01/24/19 1652     enoxaparin injection 20 mg  Once      01/24/19 1652     IP VTE HIGH RISK PATIENT  Once      01/23/19 1630        Discussed with two of patient's son. All questions answered to satisfaction.     Critical care time spent on the evaluation and treatment of severe organ dysfunction, review of pertinent labs and imaging studies, discussions with consulting providers and discussions with patient/family: > 45 minutes.    Shyann Lentz MD  Department of Hospital Medicine   [facility]

## 2019-01-24 NOTE — ASSESSMENT & PLAN NOTE
2/2 to bactrim vs renal failure vs both  No peaked T wave or QRS widening or P wave flat  Is now trending downwards  Cardiac monitoring  BMP in AM

## 2019-01-24 NOTE — ASSESSMENT & PLAN NOTE
--respiratory acidosis noted on ABG  --AAOx3 despite pH 7.1   --BIPAP to improve hypercapnea  --wean to NC, BIPAP qHS  --follow alertness, not ABG  --CXR without evidence of pna  --keep O2 sats 88%

## 2019-01-24 NOTE — ASSESSMENT & PLAN NOTE
--secondary to Bactrim vs Cardiorenal  --sCr improved with fluids; however reduced EF and evidence of volume overload present  --recommend lasix challenge or fluid removal  --Kyle for strict I/Os

## 2019-01-24 NOTE — HPI
69 year old female with hypertension, dementia, s/p dorota BKA, hyperlipidemia, smoker and dementia who presented with shortness of breath, and worsening cough. In the ED, she was tachycardic, febrile, and tachypnic. XRay without evidence of consolidation or edema. Labs showed moderate hyperkalemia, acute renal failure and elevated lactic acid. She received Solumedrol and was placed on BIPAP for ABG with significant respiratory acidosis.     Patient was last seen in the ED on 1/14 (~10 days PTA) for a UTI. Was prescribed bactrim BID x 7 days. Patient is not sure if still taking metformin or lisinopril. Son will bring list of meds.     01/23: She is AAOx3 and in no acute distress. BIPAP settings adjusted to increase CO2 removal.    01/24: BIPAP worn overnight, minimal O2 required. Wean BIPAP to Nasal cannula. Recommend stopping Bicarb gtt, EF 25% and consult cardiology for new onset systolic and diastolic heart failure. REGLA may be cardio-renal in etiology, (BNP >2800) consider Lasix for diuresis.

## 2019-01-24 NOTE — PROGRESS NOTES
[facility]  Critical Care Medicine  Progress Note    Patient Name: Zakiya Garcia  MRN: [id number]  Admission Date: 1/23/2019  Hospital Length of Stay: 1 days  Code Status: Full Code  Attending Provider: Shyann Valdes MD  Primary Care Provider: Ines Barragan MD   Principal Problem: Acute respiratory acidosis    Subjective:     HPI:  69 year old female with hypertension, dementia, s/p dorota BKA, hyperlipidemia, smoker and dementia who presented with shortness of breath, and worsening cough. In the ED, she was tachycardic, febrile, and tachypnic. XRay without evidence of consolidation or edema. Labs showed moderate hyperkalemia, acute renal failure and elevated lactic acid. She received Solumedrol and was placed on BIPAP for ABG with significant respiratory acidosis.     Patient was last seen in the ED on 1/14 (~10 days PTA) for a UTI. Was prescribed bactrim BID x 7 days. Patient is not sure if still taking metformin or lisinopril. Son will bring list of meds.     01/23: She is AAOx3 and in no acute distress. BIPAP settings adjusted to increase CO2 removal.    01/24: BIPAP worn overnight, minimal O2 required. Wean BIPAP to Nasal cannula. Recommend stopping Bicarb gtt, EF 25% and consult cardiology for new onset systolic and diastolic heart failure. REGLA may be cardio-renal in etiology, (BNP >2800) consider Lasix for diuresis.            Hospital/ICU Course:  No notes on file      Review of Systems   Constitutional: Negative for chills and fever.   HENT: Negative for sore throat and trouble swallowing.    Eyes: Negative for discharge and visual disturbance.   Respiratory: Positive for cough. Negative for shortness of breath.    Cardiovascular: Negative for chest pain and palpitations.   Gastrointestinal: Negative for abdominal distention and abdominal pain.   Endocrine: Negative for polydipsia and polyuria.   Genitourinary: Negative for frequency and hematuria.   Musculoskeletal: Negative for joint  swelling and myalgias.   Skin: Negative for color change and rash.   Neurological: Negative for dizziness and headaches.   Hematological: Negative for adenopathy. Does not bruise/bleed easily.     Objective:     Vital Signs (Most Recent):  Temp: 98.4 °F (36.9 °C) (01/24/19 0700)  Pulse: 93 (01/24/19 0830)  Resp: (!) 24 (01/24/19 0830)  BP: (!) 167/78 (01/24/19 0830)  SpO2: (!) 88 % (01/24/19 0830) Vital Signs (24h Range):  Temp:  [97.1 °F (36.2 °C)-100.3 °F (37.9 °C)] 98.4 °F (36.9 °C)  Pulse:  [] 93  Resp:  [21-65] 24  SpO2:  [84 %-100 %] 88 %  BP: ()/(52-97) 167/78   Weight: 49.4 kg (108 lb 14.5 oz)  Body mass index is 19.92 kg/m².      Intake/Output Summary (Last 24 hours) at 1/24/2019 0908  Last data filed at 1/24/2019 0835  Gross per 24 hour   Intake 2940 ml   Output --   Net 2940 ml       Physical Exam   Constitutional: She is oriented to person, place, and time. Vital signs are normal. She appears well-developed. She is cooperative. She has a sickly appearance. No distress. Face mask in place.   HENT:   Head: Normocephalic and atraumatic.   Eyes: Conjunctivae are normal. Pupils are equal, round, and reactive to light. Right eye exhibits no exudate. Left eye exhibits no exudate. Right conjunctiva has no hemorrhage. Left conjunctiva has no hemorrhage. Right eye exhibits no nystagmus. Left eye exhibits no nystagmus.   Neck: Trachea normal. No neck rigidity. No tracheal deviation present.   Cardiovascular: Normal rate, regular rhythm and normal heart sounds. PMI is not displaced. Exam reveals no gallop and no friction rub.   No murmur heard.  Pulses:       Radial pulses are 2+ on the right side, and 2+ on the left side.   Pulmonary/Chest: Effort normal. No accessory muscle usage. No respiratory distress. She has wheezes in the right lower field. She has rales in the right lower field and the left lower field.   Abdominal: Soft. Normal appearance and bowel sounds are normal. There is no tenderness.    Musculoskeletal: Normal range of motion.   Neurological: She is alert and oriented to person, place, and time. No cranial nerve deficit or sensory deficit. GCS eye subscore is 4. GCS verbal subscore is 5. GCS motor subscore is 6.   MARTINEZ 4/5, no neuro defts appreciated   Skin: Skin is warm and dry. Capillary refill takes 2 to 3 seconds. She is not diaphoretic. No cyanosis. Nails show no clubbing.   Nursing note and vitals reviewed.      Vents:  Oxygen Concentration (%): 30 (01/24/19 0722)  Lines/Drains/Airways     Peripheral Intravenous Line                 Peripheral IV - Single Lumen 01/23/19 0522 Left Forearm 1 day         Peripheral IV - Single Lumen 01/23/19 1230 Right Forearm less than 1 day              Significant Labs:    CBC/Anemia Profile:  Recent Labs   Lab 01/23/19  0545 01/23/19  1033   WBC 7.61  --    HGB 10.2*  --    HCT 32.1* 32*     --    MCV 85  --    RDW 14.3  --         Chemistries:  Recent Labs   Lab 01/23/19  0545 01/23/19  0700 01/23/19  1636 01/23/19 2019 01/24/19  0353   *  --  134* 139 139   K 6.1*  --  6.6* 5.9* 6.1*     --  108 108 111*   CO2 20*  --  21* 23 20*   BUN 23  --  26* 28* 29*   CREATININE 3.0*  --  2.7* 2.6* 2.5*   CALCIUM 8.8  --  8.3* 8.0* 8.1*   ALBUMIN 3.6  --   --   --  2.9*   PROT 7.2  --   --   --   --    BILITOT 0.2  --   --   --   --    ALKPHOS 111  --   --   --   --    ALT 8*  --   --   --   --    AST 19  --   --   --   --    MG  --  1.8  --   --   --    PHOS  --   --   --   --  5.7*       All pertinent labs within the past 24 hours have been reviewed.    Significant Imaging:  I have reviewed all pertinent imaging results/findings within the past 24 hours.      ABG  Recent Labs   Lab 01/23/19  1641   PH 7.134*   PO2 145*   PCO2 60.8*   HCO3 20.4*   BE -9     Assessment/Plan:     Pulmonary   Acute respiratory failure with hypoxia and hypercapnia    --respiratory acidosis noted on ABG  --AAOx3 despite pH 7.1   --BIPAP to improve hypercapnea  --wean  to NC, BIPAP qHS  --follow alertness, not ABG  --CXR without evidence of pna  --keep O2 sats 88%           Cardiac/Vascular   Acute combined systolic and diastolic heart failure    --EF 25%  --recommend lasix for diuresis  --consider cardiology consult    · Severely decreased left ventricular systolic function. The estimated ejection fraction is 25%. Global hypokinesis with inferoposterior WMA.  · Moderate left ventricular enlargement.  · Left ventricular diastolic dysfunction.  · Mildly to moderately reduced right ventricular systolic function.  · Mild-to-moderate mitral regurgitation.  · Moderate tricuspid regurgitation.  · The estimated PA systolic pressure is 76 mm Hg  · Pulmonary hypertension present.     Hypertension    --continue coreg and amlodipine     Renal/   * Acute respiratory acidosis    --likely secondary to volume overload   --treat symptoms not ABG as she is AAOx3   --BIPAP qHS     Acute renal failure    --secondary to Bactrim vs Cardiorenal  --sCr improved with fluids; however reduced EF and evidence of volume overload present  --recommend lasix challenge or fluid removal  --Kyle for strict I/Os     Urinary tract infection    On Rocephin     Hyperkalemia, diminished renal excretion    --secondary to REGLA  --no EKG changes arrhythmia to note.   --BMP q12      Endocrine   Diabetes mellitus type 2 in nonobese    --Holding home metformin  --accuchecks AC/HS with SSI       DVT: Lovenox  PPI: n/a    Critical Care Time: 55 minutes  Critical secondary to Patient has a condition that poses threat to life and bodily function: Acute Renal Failure, Acute respiratory failure, and Acute heart failure     Critical care was time spent personally by me on the following activities: development of treatment plan with patient or surrogate and bedside caregivers, discussions with consultants, evaluation of patient's response to treatment, examination of patient, ordering and performing treatments and interventions,  ordering and review of laboratory studies, ordering and review of radiographic studies, pulse oximetry, re-evaluation of patient's condition. This critical care time did not overlap with that of any other provider or involve time for any procedures.     Above discussed with Dr. Barrera Madsen, NP  Critical Care Medicine  [facility]

## 2019-01-24 NOTE — ASSESSMENT & PLAN NOTE
Elev trop and EKG changes without chest pain, but +CHF  ?demand in setting of ARF/CHF  Pt's son gives hx of prior ?PCI  Med mgmt for time being  Can consider cath in the future if her sensorium clears and renal fxn improves

## 2019-01-24 NOTE — ASSESSMENT & PLAN NOTE
With hypercapnia and pH of 7 slightly improved with BiPAP  Now on NC  Workup thus far revealed new severely depressed LVEF to 25% with inferoposterior WMA  Trop initially mildly elevated to 0.089 now 2. Patient is chest pain free  Unknown how long EF has been depressed but per son her SOB has been present for days  Has had a coronary stent placed 6 years ago, per son. On ASA daily but smokes cigarettes continuously and follows a high salt diet.   Currently not a good candidate for cardiac catheterization  Will treat medically and consult Cardiology  Stop IVFs! Consider diuresis if resp status worsens

## 2019-01-24 NOTE — PLAN OF CARE
01/24/19 1240   Discharge Assessment   Assessment Type Discharge Planning Assessment   Confirmed/corrected address and phone number on facesheet? Yes   Assessment information obtained from? Caregiver   Communicated expected length of stay with patient/caregiver yes   Prior to hospitilization cognitive status: Unable to Assess   Prior to hospitalization functional status: Wheelchair Bound   Current Functional Status: Wheelchair Bound   Facility Arrived From: Home    Lives With child(delmy), adult   Able to Return to Prior Arrangements yes   Is patient able to care for self after discharge? Yes   Who are your caregiver(s) and their phone number(s)? Arsenio, pt son.    Patient currently being followed by outpatient case management? No   Patient currently receives any other outside agency services? No   Equipment Currently Used at Home wheelchair   Do you have any problems affording any of your prescribed medications? No   Is the patient taking medications as prescribed? yes   Does the patient have transportation home? Yes   Transportation Anticipated family or friend will provide   Dialysis Name and Scheduled days N/A    Does the patient receive services at the [facility]? No   Discharge Plan A Home with family;Home Health   Discharge Plan B Home with family;Home Health   DME Needed Upon Discharge  none     SW to patient's room to discuss Helping the patient manage care at home. According to pt's sons, they were in the process of getting HH arranged via PCP. According to Megan, pt is a double amputee, lives with son, Arsenio, and need HH. Per Megan, he would like to have a nurse out once a week, and a aide to assist with bathing. Megan confirms pt is a wheelchair bound and he brings he to follow-up appointments.    Megan would like HH at discharge, including a skilled nurse, aide, and SW.       TN/SW role explained to pt.  Patient identified using two identifiers:  Name and date of birth.    Person who will  help at home if needed:  ophelia Cesar's son.     Preferred pharmacy:   [id number] - [place] [address] [place] [address]  [place] [address]  Phone: [phone] Fax: [phone]

## 2019-01-24 NOTE — SUBJECTIVE & OBJECTIVE
Past Medical History:   Diagnosis Date    Amputee, below knee bilateral    Cigarette smoker     Dementia     Diabetes mellitus     Hypertension     Requires assistance with activities of daily living (ADL)     Wheelchair dependent        Past Surgical History:   Procedure Laterality Date    bka      cardiac stents      HYSTERECTOMY         Review of patient's allergies indicates:  No Known Allergies    No current facility-administered medications on file prior to encounter.      Current Outpatient Medications on File Prior to Encounter   Medication Sig    amlodipine (NORVASC) 10 MG tablet Take 10 mg by mouth once daily.    atorvastatin (LIPITOR) 20 MG tablet Take 20 mg by mouth every evening.    carvedilol (COREG) 6.25 MG tablet Take 6.25 mg by mouth 2 (two) times daily.    donepezil (ARICEPT) 5 MG tablet Take 5 mg by mouth every evening.    hydrochlorothiazide (HYDRODIURIL) 25 MG tablet Take 25 mg by mouth once daily.    lisinopril (PRINIVIL,ZESTRIL) 5 MG tablet Take 5 mg by mouth once daily.     Family History     None        Tobacco Use    Smoking status: Current Every Day Smoker     Packs/day: 1.00     Years: 15.00     Pack years: 15.00     Types: Cigarettes    Smokeless tobacco: Never Used   Substance and Sexual Activity    Alcohol use: No    Drug use: No    Sexual activity: Not on file     Review of Systems   Unable to perform ROS: mental status change     Objective:     Vital Signs (Most Recent):  Temp: 99.5 °F (37.5 °C) (01/24/19 1500)  Pulse: 104 (01/24/19 1612)  Resp: (!) 26 (01/24/19 1612)  BP: (!) 169/72 (01/24/19 1600)  SpO2: 100 % (01/24/19 1612) Vital Signs (24h Range):  Temp:  [98.4 °F (36.9 °C)-99.5 °F (37.5 °C)] 99.5 °F (37.5 °C)  Pulse:  [] 104  Resp:  [20-65] 26  SpO2:  [84 %-100 %] 100 %  BP: (138-180)/(59-97) 169/72     Weight: 49.4 kg (108 lb 14.5 oz)  Body mass index is 19.92 kg/m².    SpO2: 100 %  O2 Device (Oxygen Therapy): nasal cannula      Intake/Output Summary  (Last 24 hours) at 1/24/2019 1653  Last data filed at 1/24/2019 1458  Gross per 24 hour   Intake 2183.33 ml   Output 150 ml   Net 2033.33 ml       Lines/Drains/Airways     Drain                 Urethral Catheter 01/24/19 1050 Straight-tip 16 Fr. less than 1 day          Peripheral Intravenous Line                 Peripheral IV - Single Lumen 01/23/19 0522 Left Forearm 1 day         Peripheral IV - Single Lumen 01/23/19 1230 Right Forearm 1 day                Physical Exam   Constitutional:   Chr ill woman, NAD   HENT:   Head: Normocephalic and atraumatic.   Eyes: Conjunctivae and EOM are normal. Pupils are equal, round, and reactive to light. No scleral icterus.   Neck: Normal range of motion. Neck supple. No JVD present. No tracheal deviation present. No thyromegaly present.   Cardiovascular: Regular rhythm, S1 normal and S2 normal. Tachycardia present. Exam reveals distant heart sounds.   Pulmonary/Chest: Effort normal. No respiratory distress. She has no wheezes. She has rales. She exhibits no tenderness.   Abdominal: Soft. She exhibits no distension.   Musculoskeletal:   bilat BKA   Neurological:   MS decreased   Skin: Skin is warm and dry.   Psychiatric:   UTO       Current Medications:   albuterol-ipratropium  3 mL Nebulization Q4H WAKE    amLODIPine  10 mg Oral Daily    [START ON 1/25/2019] aspirin  81 mg Oral Daily    atorvastatin  20 mg Oral QHS    carvedilol  6.25 mg Oral BID    cefTRIAXone (ROCEPHIN) IVPB  1 g Intravenous Q24H    clopidogrel  300 mg Oral Once    [START ON 1/25/2019] clopidogrel  75 mg Oral Daily    donepezil  5 mg Oral QHS    enoxaparin  20 mg Subcutaneous Once    [START ON 1/25/2019] enoxaparin  1 mg/kg Subcutaneous Daily    sodium polystyrene  15 g Oral BID       acetaminophen, dextrose 50%, glucagon (human recombinant), insulin aspart U-100, ondansetron, sodium chloride 0.9%    Laboratory:  CBC:  Recent Labs   Lab 01/23/19  0545 01/23/19  1033   WHITE BLOOD CELL COUNT 7.61   --    HEMOGLOBIN 10.2 L  --    POC HEMATOCRIT  --  32 L   HEMATOCRIT 32.1 L  --    PLATELETS 322  --        CHEMISTRIES:  Recent Labs   Lab 01/23/19  0545 01/23/19  0700 01/23/19  1636 01/23/19  2019 01/24/19  0353 01/24/19  1204   GLUCOSE 156 H  --  133 H 176 H 109 106   SODIUM 133 L  --  134 L 139 139 141   POTASSIUM 6.1 H  --  6.6 HH 5.9 H 6.1 H 5.5 H   BUN BLD 23  --  26 H 28 H 29 H 31 H   CREATININE 3.0 H  --  2.7 H 2.6 H 2.5 H 2.5 H   EGFR IF  17 A  --  20 A 21 A 22 A 22 A   EGFR IF NON- 15 A  --  17 A 18 A 19 A 19 A   CALCIUM 8.8  --  8.3 L 8.0 L 8.1 L 7.8 L   MAGNESIUM  --  1.8  --   --   --   --        CARDIAC BIOMARKERS:  Recent Labs   Lab 01/23/19  0545 01/23/19  0857 01/24/19  1549   TROPONIN I 0.050 H 0.089 H 2.087 H       COAGS:        LIPIDS/LFTS:  Recent Labs   Lab 01/23/19  0545   AST 19   ALT 8 L       BNP:  Recent Labs   Lab 01/23/19  0545   BNP 2,868 H       TSH:        Free T4:        Diagnostic Results:  ECG (personally reviewed tracings):  1/23/18 0548 , ?ASMI-age indet, inflat ST abnl ?isch, new vs 1/14/19    Chest X-Ray (personally reviewed image(s)): 1/23/19 [facility], ?mild CHF    Echo: 1/23/19 (images pers rev)  · Severely decreased left ventricular systolic function. The estimated ejection fraction is 25%. Global hypokinesis with inferoposterior WMA.  · Moderate left ventricular enlargement.  · Left ventricular diastolic dysfunction.  · Mildly to moderately reduced right ventricular systolic function.  · Mild-to-moderate mitral regurgitation.  · Moderate tricuspid regurgitation.  · The estimated PA systolic pressure is 76 mm Hg  · Pulmonary hypertension present.

## 2019-01-24 NOTE — ASSESSMENT & PLAN NOTE
Smoking all day long  Spent > 10 minutes discussing tobacco cessation  Patient does not appear to be motivated. Not interested on nicotine patch

## 2019-01-24 NOTE — PT/OT/SLP EVAL
"Speech Language Pathology Evaluation  Bedside Swallow    Patient Name:  Zakiya Garcia   MRN:  [id number]  Admitting Diagnosis: Acute respiratory acidosis    Recommendations:                 General Recommendations:  Dysphagia therapy  Diet recommendations:  Regular, Thin   Aspiration Precautions: 1 bite/sip at a time, Alternating bites/sips, Feed only when awake/alert, HOB to 90 degrees, Monitor for s/s of aspiration, Remain upright 30 minutes post meal, Small bites/sips and Standard aspiration precautions   General Precautions: Standard, fall  Communication strategies:  none    History:     Past Medical History:   Diagnosis Date    Amputee, below knee bilateral    Cigarette smoker     Dementia     Diabetes mellitus     Hypertension     Requires assistance with activities of daily living (ADL)     Wheelchair dependent        Past Surgical History:   Procedure Laterality Date    bka      cardiac stents      HYSTERECTOMY         Social History: Patient lives with children    Chest X-Rays: The lungs appear symmetrically aerated without definite focal alveolar consolidation. No large pleural effusion or pneumothorax is appreciated.Visualized osseous structures demonstrate no acute abnormality.    Prior diet: Unrestricted      Subjective     Pt 's son at bedside upon SLP arrival. Pt lethargic during eval, however, agreeable and cooperative with swallow eval.    Patient goals: " I love you all" per pt    Pain/Comfort:  · Pain Rating 1: 0/10    Objective:     Oral Musculature Evaluation  · Oral Musculature: WFL  · Dentition: teeth in poor condition(few lower dentition; edenulous upper- no dentures present at time of eval)  · Secretion Management: adequate  · Mucosal Quality: good  · Mandibular Strength and Mobility: WFL  · Oral Labial Strength and Mobility: WFL  · Lingual Strength and Mobility: WFL  · Velar Elevation: WFL  · Buccal Strength and Mobility: WFL  · Oral Musculature Comments: structure & function is " adequate for mastication and swallowing    Bedside Swallow Eval:   Consistencies Assessed:  · Thin liquids via straw x5 trials  · Puree x3 trials  · Solids x 2 trials     Oral Phase:   · WFL    Pharyngeal Phase:   · no overt clinical signs/symptoms of aspiration    Compensatory Strategies  · None    Treatment: ST will follow pt over 2 sessions to monitor diet to[place]    Assessment:     Zakiya Garcia is a 69 y.o. female with a dx of Acute respiratory acidosis.  She presents with a functional swallow. No overt s/s of aspiration observed with any of the above consistencies assessed. ST will follow pt for 2 sessions to monitor diet tolerance. Pt's speech/language functioning is at baseline per son report.    Goals:   Multidisciplinary Problems     SLP Goals        Problem: SLP Goal    Goal Priority Disciplines Outcome   SLP Goal    Low SLP    Description:  ST. Pt will participate in swallow eval to determine least restrictive diet with no overt s/s of aspiration. -GOAL MET   2. Pt will tolerate PO diet of regular solids and thin liquids with no overt s/s of aspiration over 2 consecutive sessions.                    Plan:     · Patient to be seen:  2 x/week   · Plan of Care expires:  19  · Plan of Care reviewed with:  patient, son   · SLP Follow-Up:  Yes       Discharge recommendations:  (tbd)   Barriers to Discharge:  None    Time Tracking:     SLP Treatment Date:   19  Speech Start Time:  1201  Speech Stop Time:  1218     Speech Total Time (min):  17 min    Billable Minutes: Eval Swallow and Oral Function 17 min    Virginia Cole CCC-SLP  2019

## 2019-01-24 NOTE — CONSULTS
[facility]  Cardiology  Consult Note    Patient Name: Zakiya Garcia  MRN: [id number]  Admission Date: 1/23/2019  Hospital Length of Stay: 1 days  Code Status: Full Code   Attending Provider: Shyann Valdes MD   Consulting Provider: Sebastian Sanchez MD  Primary Care Physician: Ines Barragan MD  Principal Problem:Acute respiratory acidosis    Patient information was obtained from relative(s) and ER records.     Inpatient consult to Cardiology  Consult performed by: Sebastian Sanchez MD  Consult ordered by: Shyann Valdes MD  Reason for consult: CHF/NSTEMI        Subjective:     Chief Complaint:  SOB     HPI:   69 year old female with hypertension, dementia, s/p dorota BKA, hyperlipidemia, smoker and dementia who presented with shortness of breath. Per EMS, patient's sats were 90% at room air then 100% after breathing treatment and a dose of solumedrol 125 mg IM. Patient's son reported gradual worsening of non productive cough as well as emesis after coughing this AM. Chest pain when coughing. Patient was last seen in the ED on 1/14 (~10 days PTA) for a UTI. Was prescribed bactrim BID x 7 days. Patient is not sure if still taking metformin or lisinopril. Son will bring list of meds.   In the ED, patient was tachycardic, with low grade fever, with tachypnea and sat 92% at room air. Lungs clear to auscultation and XRay without evidence of consolidation or edema. Labs showed moderate hyperkalemia, acute renal failure and elevated lactic acid. ABG showed pH of 7.0 with hypercapnia. Patient admitted to ICU for close monitoring given such significant metabolic derangements.     Cardiology is consulted for elevated troponin, heart failure, and cardiomyopathy.  The patient is unable to provide any history at the present time, although the patient's son is at the bedside.  He tells me that she has had a coronary stent in the past.  Her note from Dr. Hoang back in 2011, the patient may also have a  history of renal stenting in the past.  All the her on arrival, no was made of significant renal insufficiency on background of Bactrim use for which she potential renal tubular acidosis was brought up as a diagnosis for and the patient was placed on a sodium bicarbonate drip.  He echocardiography revealed an ejection fraction of 25% with inferior posterior wall motion abnormality.    Past Medical History:   Diagnosis Date    Amputee, below knee bilateral    Cigarette smoker     Dementia     Diabetes mellitus     Hypertension     Requires assistance with activities of daily living (ADL)     Wheelchair dependent        Past Surgical History:   Procedure Laterality Date    bka      cardiac stents      HYSTERECTOMY         Review of patient's allergies indicates:  No Known Allergies    No current facility-administered medications on file prior to encounter.      Current Outpatient Medications on File Prior to Encounter   Medication Sig    amlodipine (NORVASC) 10 MG tablet Take 10 mg by mouth once daily.    atorvastatin (LIPITOR) 20 MG tablet Take 20 mg by mouth every evening.    carvedilol (COREG) 6.25 MG tablet Take 6.25 mg by mouth 2 (two) times daily.    donepezil (ARICEPT) 5 MG tablet Take 5 mg by mouth every evening.    hydrochlorothiazide (HYDRODIURIL) 25 MG tablet Take 25 mg by mouth once daily.    lisinopril (PRINIVIL,ZESTRIL) 5 MG tablet Take 5 mg by mouth once daily.     Family History     None        Tobacco Use    Smoking status: Current Every Day Smoker     Packs/day: 1.00     Years: 15.00     Pack years: 15.00     Types: Cigarettes    Smokeless tobacco: Never Used   Substance and Sexual Activity    Alcohol use: No    Drug use: No    Sexual activity: Not on file     Review of Systems   Unable to perform ROS: mental status change     Objective:     Vital Signs (Most Recent):  Temp: 99.5 °F (37.5 °C) (01/24/19 1500)  Pulse: 104 (01/24/19 1612)  Resp: (!) 26 (01/24/19 1612)  BP: (!)  169/72 (01/24/19 1600)  SpO2: 100 % (01/24/19 1612) Vital Signs (24h Range):  Temp:  [98.4 °F (36.9 °C)-99.5 °F (37.5 °C)] 99.5 °F (37.5 °C)  Pulse:  [] 104  Resp:  [20-65] 26  SpO2:  [84 %-100 %] 100 %  BP: (138-180)/(59-97) 169/72     Weight: 49.4 kg (108 lb 14.5 oz)  Body mass index is 19.92 kg/m².    SpO2: 100 %  O2 Device (Oxygen Therapy): nasal cannula      Intake/Output Summary (Last 24 hours) at 1/24/2019 1653  Last data filed at 1/24/2019 1458  Gross per 24 hour   Intake 2183.33 ml   Output 150 ml   Net 2033.33 ml       Lines/Drains/Airways     Drain                 Urethral Catheter 01/24/19 1050 Straight-tip 16 Fr. less than 1 day          Peripheral Intravenous Line                 Peripheral IV - Single Lumen 01/23/19 0522 Left Forearm 1 day         Peripheral IV - Single Lumen 01/23/19 1230 Right Forearm 1 day                Physical Exam   Constitutional:   Chr ill woman, NAD   HENT:   Head: Normocephalic and atraumatic.   Eyes: Conjunctivae and EOM are normal. Pupils are equal, round, and reactive to light. No scleral icterus.   Neck: Normal range of motion. Neck supple. No JVD present. No tracheal deviation present. No thyromegaly present.   Cardiovascular: Regular rhythm, S1 normal and S2 normal. Tachycardia present. Exam reveals distant heart sounds.   Pulmonary/Chest: Effort normal. No respiratory distress. She has no wheezes. She has rales. She exhibits no tenderness.   Abdominal: Soft. She exhibits no distension.   Musculoskeletal:   bilat BKA   Neurological:   MS decreased   Skin: Skin is warm and dry.   Psychiatric:   UTO       Current Medications:   albuterol-ipratropium  3 mL Nebulization Q4H WAKE    amLODIPine  10 mg Oral Daily    [START ON 1/25/2019] aspirin  81 mg Oral Daily    atorvastatin  20 mg Oral QHS    carvedilol  6.25 mg Oral BID    cefTRIAXone (ROCEPHIN) IVPB  1 g Intravenous Q24H    clopidogrel  300 mg Oral Once    [START ON 1/25/2019] clopidogrel  75 mg Oral  Daily    donepezil  5 mg Oral QHS    enoxaparin  20 mg Subcutaneous Once    [START ON 1/25/2019] enoxaparin  1 mg/kg Subcutaneous Daily    sodium polystyrene  15 g Oral BID       acetaminophen, dextrose 50%, glucagon (human recombinant), insulin aspart U-100, ondansetron, sodium chloride 0.9%    Laboratory:  CBC:  Recent Labs   Lab 01/23/19  0545 01/23/19  1033   WHITE BLOOD CELL COUNT 7.61  --    HEMOGLOBIN 10.2 L  --    POC HEMATOCRIT  --  32 L   HEMATOCRIT 32.1 L  --    PLATELETS 322  --        CHEMISTRIES:  Recent Labs   Lab 01/23/19  0545 01/23/19  0700 01/23/19  1636 01/23/19  2019 01/24/19  0353 01/24/19  1204   GLUCOSE 156 H  --  133 H 176 H 109 106   SODIUM 133 L  --  134 L 139 139 141   POTASSIUM 6.1 H  --  6.6 HH 5.9 H 6.1 H 5.5 H   BUN BLD 23  --  26 H 28 H 29 H 31 H   CREATININE 3.0 H  --  2.7 H 2.6 H 2.5 H 2.5 H   EGFR IF  17 A  --  20 A 21 A 22 A 22 A   EGFR IF NON- 15 A  --  17 A 18 A 19 A 19 A   CALCIUM 8.8  --  8.3 L 8.0 L 8.1 L 7.8 L   MAGNESIUM  --  1.8  --   --   --   --        CARDIAC BIOMARKERS:  Recent Labs   Lab 01/23/19  0545 01/23/19  0857 01/24/19  1549   TROPONIN I 0.050 H 0.089 H 2.087 H       COAGS:        LIPIDS/LFTS:  Recent Labs   Lab 01/23/19  0545   AST 19   ALT 8 L       BNP:  Recent Labs   Lab 01/23/19  0545   BNP 2,868 H       TSH:        Free T4:        Diagnostic Results:  ECG (personally reviewed tracings):  1/23/18 0548 , ?ASMI-age indet, inflat ST abnl ?isch, new vs 1/14/19    Chest X-Ray (personally reviewed image(s)): 1/23/19 [facility], ?mild CHF    Echo: 1/23/19 (images pers rev)  · Severely decreased left ventricular systolic function. The estimated ejection fraction is 25%. Global hypokinesis with inferoposterior WMA.  · Moderate left ventricular enlargement.  · Left ventricular diastolic dysfunction.  · Mildly to moderately reduced right ventricular systolic function.  · Mild-to-moderate mitral regurgitation.  · Moderate tricuspid  regurgitation.  · The estimated PA systolic pressure is 76 mm Hg  · Pulmonary hypertension present.      Assessment and Plan:     * Acute respiratory acidosis    Mgmt per IM/pulm  Significant smoking hx noted     NSTEMI (non-ST elevated myocardial infarction)    Elev trop and EKG changes without chest pain, but +CHF  ?demand in setting of ARF/CHF  Pt's son gives hx of prior ?PCI  Med mgmt for time being  Can consider cath in the future if her sensorium clears and renal fxn improves     Acute combined systolic and diastolic heart failure    EF 25% with inferoposterior WMA suggestive of prior CAD  She seems vol overloaded  Suggest IV lasix and afterload reduction (Start Hydrala/Imdur)  Cont coreg  Stop amlod in favor of hydrala/Imdur  Cont ASA/statin +/- plavix  Given her current MS and ARF, she is not a candidate for cath lab at the present time.  If her MS were to clear and renal fxn improve, we could consider it.     Acute renal failure    Per IM/neph     Diabetes mellitus type 2 in nonobese    Per IM         VTE Risk Mitigation (From admission, onward)        Ordered     enoxaparin injection 50 mg  Daily      01/24/19 1652     enoxaparin injection 20 mg  Once      01/24/19 1652     IP VTE HIGH RISK PATIENT  Once      01/23/19 1630        Pt seen in ICU, case d/w Johana Rust and Tor.  Critical care time 35min.    Thank you for your consult. I will follow-up with patient. Please contact us if you have any additional questions.    Sebastian Sanchez MD  Cardiology   [facility]

## 2019-01-24 NOTE — ASSESSMENT & PLAN NOTE
--EF 25%  --recommend lasix for diuresis  --consider cardiology consult    · Severely decreased left ventricular systolic function. The estimated ejection fraction is 25%. Global hypokinesis with inferoposterior WMA.  · Moderate left ventricular enlargement.  · Left ventricular diastolic dysfunction.  · Mildly to moderately reduced right ventricular systolic function.  · Mild-to-moderate mitral regurgitation.  · Moderate tricuspid regurgitation.  · The estimated PA systolic pressure is 76 mm Hg  · Pulmonary hypertension present.

## 2019-01-24 NOTE — HPI
69 year old female with hypertension, dementia, s/p dorota BKA, hyperlipidemia, smoker and dementia who presented with shortness of breath. Per EMS, patient's sats were 90% at room air then 100% after breathing treatment and a dose of solumedrol 125 mg IM. Patient's son reported gradual worsening of non productive cough as well as emesis after coughing this AM. Chest pain when coughing. Patient was last seen in the ED on 1/14 (~10 days PTA) for a UTI. Was prescribed bactrim BID x 7 days. Patient is not sure if still taking metformin or lisinopril. Son will bring list of meds.   In the ED, patient was tachycardic, with low grade fever, with tachypnea and sat 92% at room air. Lungs clear to auscultation and XRay without evidence of consolidation or edema. Labs showed moderate hyperkalemia, acute renal failure and elevated lactic acid. ABG showed pH of 7.0 with hypercapnia. Patient admitted to ICU for close monitoring given such significant metabolic derangements.     Cardiology is consulted for elevated troponin, heart failure, and cardiomyopathy.  The patient is unable to provide any history at the present time, although the patient's son is at the bedside.  He tells me that she has had a coronary stent in the past.  Her note from Dr. Hoang back in 2011, the patient may also have a history of renal stenting in the past.  All the her on arrival, no was made of significant renal insufficiency on background of Bactrim use for which she potential renal tubular acidosis was brought up as a diagnosis for and the patient was placed on a sodium bicarbonate drip.  He echocardiography revealed an ejection fraction of 25% with inferior posterior wall motion abnormality.

## 2019-01-24 NOTE — SUBJECTIVE & OBJECTIVE
Interval History: Echo with LVEF of 25% with wall motion abnormality. Abnormal EKG with trop of 2. Patient denies ever having chest pain. Son states all she does is smoke all day, drink coke and take goody powder. Apparently been feeling bad for days. Son states she had a coronary stent placed 6 years ago but no known heart failure.     Review of Systems   Respiratory: Negative.    Cardiovascular: Negative.    Gastrointestinal: Negative.      Objective:     Vital Signs (Most Recent):  Temp: 99.5 °F (37.5 °C) (01/24/19 1500)  Pulse: 104 (01/24/19 1612)  Resp: (!) 26 (01/24/19 1612)  BP: (!) 169/72 (01/24/19 1600)  SpO2: 100 % (01/24/19 1612) Vital Signs (24h Range):  Temp:  [98.4 °F (36.9 °C)-99.5 °F (37.5 °C)] 99.5 °F (37.5 °C)  Pulse:  [] 104  Resp:  [20-65] 26  SpO2:  [84 %-100 %] 100 %  BP: (138-180)/(59-97) 169/72     Weight: 49.4 kg (108 lb 14.5 oz)  Body mass index is 19.92 kg/m².    Intake/Output Summary (Last 24 hours) at 1/24/2019 1652  Last data filed at 1/24/2019 1458  Gross per 24 hour   Intake 2183.33 ml   Output 150 ml   Net 2033.33 ml      Physical Exam   Constitutional: She appears well-developed. No distress.   Cardiovascular: Normal rate and regular rhythm.   Pulmonary/Chest: She has wheezes. She has no rales.   Using accessory muscles when speaking  Speaking short sentences but states she is feeling ok   Abdominal: Soft. Bowel sounds are normal.   Musculoskeletal: She exhibits no edema.   Bilateral BKA   Neurological:   drowsy   Skin: Skin is warm. She is not diaphoretic.   Nursing note and vitals reviewed.      Significant Labs: All pertinent labs within the past 24 hours have been reviewed.    Significant Imaging: I have reviewed all pertinent imaging results/findings within the past 24 hours.  I have reviewed and interpreted all pertinent imaging results/findings within the past 24 hours.

## 2019-01-25 LAB
ALBUMIN SERPL BCP-MCNC: 2.8 G/DL
ALLENS TEST: ABNORMAL
ANION GAP SERPL CALC-SCNC: 10 MMOL/L
BACTERIA UR CULT: NO GROWTH
BASOPHILS NFR BLD: 0 %
BUN SERPL-MCNC: 37 MG/DL
CALCIUM SERPL-MCNC: 7.4 MG/DL
CHLORIDE SERPL-SCNC: 108 MMOL/L
CO2 SERPL-SCNC: 21 MMOL/L
CREAT SERPL-MCNC: 2.7 MG/DL
DELSYS: ABNORMAL
DIFFERENTIAL METHOD: ABNORMAL
EOSINOPHIL NFR BLD: 0 %
EP: 5
ERYTHROCYTE [DISTWIDTH] IN BLOOD BY AUTOMATED COUNT: 14.8 %
ERYTHROCYTE [SEDIMENTATION RATE] IN BLOOD BY WESTERGREN METHOD: 12 MM/H
ERYTHROCYTE [SEDIMENTATION RATE] IN BLOOD BY WESTERGREN METHOD: 14 MM/H
ERYTHROCYTE [SEDIMENTATION RATE] IN BLOOD BY WESTERGREN METHOD: 14 MM/H
EST. GFR  (AFRICAN AMERICAN): 20 ML/MIN/1.73 M^2
EST. GFR  (NON AFRICAN AMERICAN): 17 ML/MIN/1.73 M^2
FIO2: 28
FIO2: 28
FIO2: 30
GIANT PLATELETS BLD QL SMEAR: PRESENT
GLUCOSE SERPL-MCNC: 104 MG/DL
HCO3 UR-SCNC: 22.6 MMOL/L (ref 24–28)
HCO3 UR-SCNC: 22.7 MMOL/L (ref 24–28)
HCO3 UR-SCNC: 23.1 MMOL/L (ref 24–28)
HCT VFR BLD AUTO: 29.3 %
HGB BLD-MCNC: 8.8 G/DL
IP: 15
IP: 18
IP: 18
LYMPHOCYTES NFR BLD: 8 %
MCH RBC QN AUTO: 27.2 PG
MCHC RBC AUTO-ENTMCNC: 30 G/DL
MCV RBC AUTO: 91 FL
METAMYELOCYTES NFR BLD MANUAL: 5 %
MIN VOL: 6.1
MIN VOL: 7
MIN VOL: 7.8
MODE: ABNORMAL
MONOCYTES NFR BLD: 10 %
MYELOCYTES NFR BLD MANUAL: 3 %
NEUTROPHILS NFR BLD: 65 %
NEUTS BAND NFR BLD MANUAL: 9 %
PCO2 BLDA: 60.2 MMHG (ref 35–45)
PCO2 BLDA: 60.7 MMHG (ref 35–45)
PCO2 BLDA: 61 MMHG (ref 35–45)
PH SMN: 7.18 [PH] (ref 7.35–7.45)
PH SMN: 7.18 [PH] (ref 7.35–7.45)
PH SMN: 7.19 [PH] (ref 7.35–7.45)
PHOSPHATE SERPL-MCNC: 5.9 MG/DL
PLATELET # BLD AUTO: 225 K/UL
PLATELET BLD QL SMEAR: ABNORMAL
PMV BLD AUTO: 10.3 FL
PO2 BLDA: 100 MMHG (ref 80–100)
PO2 BLDA: 80 MMHG (ref 80–100)
PO2 BLDA: 88 MMHG (ref 80–100)
POC BE: -5 MMOL/L
POC BE: -6 MMOL/L
POC BE: -6 MMOL/L
POC SATURATED O2: 92 % (ref 95–100)
POC SATURATED O2: 94 % (ref 95–100)
POC SATURATED O2: 96 % (ref 95–100)
POC TCO2: 24 MMOL/L (ref 23–27)
POC TCO2: 24 MMOL/L (ref 23–27)
POC TCO2: 25 MMOL/L (ref 23–27)
POCT GLUCOSE: 104 MG/DL (ref 70–110)
POCT GLUCOSE: 136 MG/DL (ref 70–110)
POCT GLUCOSE: 76 MG/DL (ref 70–110)
POCT GLUCOSE: 95 MG/DL (ref 70–110)
POTASSIUM SERPL-SCNC: 4.8 MMOL/L
RBC # BLD AUTO: 3.23 M/UL
SALICYLATES SERPL-MCNC: <5 MG/DL
SAMPLE: ABNORMAL
SITE: ABNORMAL
SODIUM SERPL-SCNC: 139 MMOL/L
SP02: 96
SP02: 97
SP02: 99
SPONT RATE: 16
SPONT RATE: 26
SPONT RATE: 27
WBC # BLD AUTO: 9.14 K/UL

## 2019-01-25 PROCEDURE — 99232 PR SUBSEQUENT HOSPITAL CARE,LEVL II: ICD-10-PCS | Mod: ,,, | Performed by: INTERNAL MEDICINE

## 2019-01-25 PROCEDURE — 25000242 PHARM REV CODE 250 ALT 637 W/ HCPCS: Performed by: INTERNAL MEDICINE

## 2019-01-25 PROCEDURE — 80307 DRUG TEST PRSMV CHEM ANLYZR: CPT

## 2019-01-25 PROCEDURE — 94660 CPAP INITIATION&MGMT: CPT

## 2019-01-25 PROCEDURE — 63600175 PHARM REV CODE 636 W HCPCS: Performed by: INTERNAL MEDICINE

## 2019-01-25 PROCEDURE — 80069 RENAL FUNCTION PANEL: CPT

## 2019-01-25 PROCEDURE — S0028 INJECTION, FAMOTIDINE, 20 MG: HCPCS | Performed by: INTERNAL MEDICINE

## 2019-01-25 PROCEDURE — 20000000 HC ICU ROOM

## 2019-01-25 PROCEDURE — 25000003 PHARM REV CODE 250: Performed by: INTERNAL MEDICINE

## 2019-01-25 PROCEDURE — 63600175 PHARM REV CODE 636 W HCPCS: Performed by: EMERGENCY MEDICINE

## 2019-01-25 PROCEDURE — 99900035 HC TECH TIME PER 15 MIN (STAT)

## 2019-01-25 PROCEDURE — 36415 COLL VENOUS BLD VENIPUNCTURE: CPT

## 2019-01-25 PROCEDURE — 82803 BLOOD GASES ANY COMBINATION: CPT

## 2019-01-25 PROCEDURE — 85007 BL SMEAR W/DIFF WBC COUNT: CPT

## 2019-01-25 PROCEDURE — 94640 AIRWAY INHALATION TREATMENT: CPT

## 2019-01-25 PROCEDURE — 85027 COMPLETE CBC AUTOMATED: CPT

## 2019-01-25 PROCEDURE — 99232 SBSQ HOSP IP/OBS MODERATE 35: CPT | Mod: ,,, | Performed by: INTERNAL MEDICINE

## 2019-01-25 PROCEDURE — 94761 N-INVAS EAR/PLS OXIMETRY MLT: CPT

## 2019-01-25 PROCEDURE — 36600 WITHDRAWAL OF ARTERIAL BLOOD: CPT

## 2019-01-25 RX ORDER — NICARDIPINE HYDROCHLORIDE 0.2 MG/ML
2.5 INJECTION INTRAVENOUS CONTINUOUS
Status: DISCONTINUED | OUTPATIENT
Start: 2019-01-25 | End: 2019-01-27

## 2019-01-25 RX ORDER — FUROSEMIDE 10 MG/ML
40 INJECTION INTRAMUSCULAR; INTRAVENOUS 2 TIMES DAILY
Status: DISCONTINUED | OUTPATIENT
Start: 2019-01-26 | End: 2019-01-26

## 2019-01-25 RX ORDER — HYDRALAZINE HYDROCHLORIDE 20 MG/ML
10 INJECTION INTRAMUSCULAR; INTRAVENOUS ONCE
Status: COMPLETED | OUTPATIENT
Start: 2019-01-25 | End: 2019-01-25

## 2019-01-25 RX ORDER — ATORVASTATIN CALCIUM 40 MG/1
80 TABLET, FILM COATED ORAL NIGHTLY
Status: DISCONTINUED | OUTPATIENT
Start: 2019-01-25 | End: 2019-01-25

## 2019-01-25 RX ORDER — FAMOTIDINE 10 MG/ML
20 INJECTION INTRAVENOUS DAILY
Status: DISCONTINUED | OUTPATIENT
Start: 2019-01-25 | End: 2019-02-05

## 2019-01-25 RX ADMIN — ENOXAPARIN SODIUM 50 MG: 100 INJECTION SUBCUTANEOUS at 05:01

## 2019-01-25 RX ADMIN — IPRATROPIUM BROMIDE AND ALBUTEROL SULFATE 3 ML: .5; 3 SOLUTION RESPIRATORY (INHALATION) at 11:01

## 2019-01-25 RX ADMIN — NICARDIPINE HYDROCHLORIDE 12.5 MG/HR: 0.2 INJECTION, SOLUTION INTRAVENOUS at 11:01

## 2019-01-25 RX ADMIN — NICARDIPINE HYDROCHLORIDE 12.5 MG/HR: 0.2 INJECTION, SOLUTION INTRAVENOUS at 08:01

## 2019-01-25 RX ADMIN — NICARDIPINE HYDROCHLORIDE 2.5 MG/HR: 0.2 INJECTION, SOLUTION INTRAVENOUS at 03:01

## 2019-01-25 RX ADMIN — CEFTRIAXONE 1 G: 1 INJECTION, SOLUTION INTRAVENOUS at 09:01

## 2019-01-25 RX ADMIN — DEXTROSE MONOHYDRATE 12.5 G: 500 INJECTION PARENTERAL at 12:01

## 2019-01-25 RX ADMIN — ONDANSETRON 4 MG: 2 INJECTION INTRAMUSCULAR; INTRAVENOUS at 04:01

## 2019-01-25 RX ADMIN — FUROSEMIDE 40 MG: 10 INJECTION, SOLUTION INTRAVENOUS at 09:01

## 2019-01-25 RX ADMIN — IPRATROPIUM BROMIDE AND ALBUTEROL SULFATE 3 ML: .5; 3 SOLUTION RESPIRATORY (INHALATION) at 04:01

## 2019-01-25 RX ADMIN — IPRATROPIUM BROMIDE AND ALBUTEROL SULFATE 3 ML: .5; 3 SOLUTION RESPIRATORY (INHALATION) at 08:01

## 2019-01-25 RX ADMIN — HYDRALAZINE HYDROCHLORIDE 25 MG: 25 TABLET ORAL at 05:01

## 2019-01-25 RX ADMIN — FAMOTIDINE 20 MG: 10 INJECTION INTRAVENOUS at 04:01

## 2019-01-25 RX ADMIN — IPRATROPIUM BROMIDE AND ALBUTEROL SULFATE 3 ML: .5; 3 SOLUTION RESPIRATORY (INHALATION) at 07:01

## 2019-01-25 RX ADMIN — HYDRALAZINE HYDROCHLORIDE 10 MG: 20 INJECTION INTRAMUSCULAR; INTRAVENOUS at 11:01

## 2019-01-25 NOTE — SUBJECTIVE & OBJECTIVE
Interval History:  Stable on BiPAP with and denies any chest pain but limited historian due to baseline mental status     Telemetry:  Normal sinus rhythm    Review of Systems   Unable to perform ROS: dementia     Objective:     Vital Signs (Most Recent):  Temp: 99.5 °F (37.5 °C) (01/25/19 0345)  Pulse: 85 (01/25/19 0741)  Resp: (!) 30 (01/25/19 0741)  BP: (!) 160/68 (01/25/19 0700)  SpO2: 95 % (01/25/19 0741) Vital Signs (24h Range):  Temp:  [98.5 °F (36.9 °C)-99.8 °F (37.7 °C)] 99.5 °F (37.5 °C)  Pulse:  [] 85  Resp:  [18-30] 30  SpO2:  [92 %-100 %] 95 %  BP: (110-179)/(56-90) 160/68     Weight: 49.4 kg (108 lb 14.5 oz)  Body mass index is 19.92 kg/m².     SpO2: 95 %  O2 Device (Oxygen Therapy): BiPAP      Intake/Output Summary (Last 24 hours) at 1/25/2019 0854  Last data filed at 1/25/2019 0700  Gross per 24 hour   Intake 910 ml   Output 620 ml   Net 290 ml       Lines/Drains/Airways     Drain                 Urethral Catheter 01/24/19 1050 Straight-tip 16 Fr. less than 1 day          Peripheral Intravenous Line                 Peripheral IV - Single Lumen 01/23/19 0522 Left Forearm 2 days         Peripheral IV - Single Lumen 01/23/19 1230 Right Forearm 1 day                Physical Exam   Constitutional: No distress.   On BiPAP   Eyes: Conjunctivae and EOM are normal. Pupils are equal, round, and reactive to light.   Cardiovascular: Normal rate and regular rhythm.   Pulmonary/Chest:   Course decreased breath sounds   Abdominal: Soft. Bowel sounds are normal.   Musculoskeletal: She exhibits no edema.   Neurological: She is alert.   Skin: Skin is warm and dry.       Significant Labs:   ABG:   Recent Labs   Lab 01/23/19  1641 01/25/19  0625 01/25/19  0741   PH 7.134* 7.176* 7.183*   PCO2 60.8* 61.0* 60.2*   HCO3 20.4* 22.6* 22.7*   POCSATURATED 98 96 92*   BE -9 -6 -6   , BMP:   Recent Labs   Lab 01/24/19  0353 01/24/19  1204 01/25/19  0231    106 104    141 139   K 6.1* 5.5* 4.8   * 112*  108   CO2 20* 19* 21*   BUN 29* 31* 37*   CREATININE 2.5* 2.5* 2.7*   CALCIUM 8.1* 7.8* 7.4*    and CBC   Recent Labs   Lab 01/25/19  0231   WBC 9.14   HGB 8.8*   HCT 29.3*          Significant Imaging: Echocardiogram:   Transthoracic echo (TTE) complete (Cupid Only):   Results for orders placed or performed during the hospital encounter of 01/23/19   Transthoracic echo (TTE) complete (Cupid Only)   Result Value Ref Range    BSA 1.54 m2    LA WIDTH 4.31 cm    AORTIC VALVE CUSP SEPERATION 1.65 cm    PV PEAK VELOCITY 1.00 cm/s    LVIDD 5.04 3.5 - 6.0 cm    IVS 0.72 0.6 - 1.1 cm    PW 0.75 0.6 - 1.1 cm    Ao root annulus 2.42 cm    LVIDS 4.40 (A) 2.1 - 4.0 cm    FS 13 28 - 44 %    LA volume 75.27 cm3    Sinus 2.89 cm    STJ 1.90 cm    Ascending aorta 2.40 cm    LV mass 123.63 g    LA size 4.15 cm    RVDD 3.00 cm    TAPSE 1.75 cm    RV S' 8.72 m/s    Left Ventricle Relative Wall Thickness 0.30 cm    AV mean gradient 2.38 mmHg    AV valve area 2.12 cm2    AV Velocity Ratio 0.73     AV index (prosthetic) 0.65     IVRT 0.08 msec    LVOT diameter 2.04 cm    LVOT area 3.27 cm2    LVOT peak richard [id number] m/s    LVOT peak VTI 12.65 cm    Ao peak richard 1.01 m/s    Ao VTI 19.45 cm    LVOT stroke volume 41.33 cm3    AV peak gradient 4.08 mmHg    MV Peak E Richard 1.36 m/s    TR Max Richard 4.13 m/s    LV Systolic Volume 87.85 mL    LV Systolic Volume Index 57.1 mL/m2    LV Diastolic Volume 120.66 mL    LV Diastolic Volume Index 78.43 mL/m2    LA Volume Index 48.9 mL/m2    LV Mass Index 80.4 g/m2    RA Major Axis 4.58 cm    Left Atrium Minor Axis 5.23 cm    Left Atrium Major Axis 4.70 cm    Triscuspid Valve Regurgitation Peak Gradient 68.23 mmHg    RA Width 3.32 cm    Right Atrial Pressure (from IVC) 8 mmHg    TV rest pulmonary artery pressure 76 mmHg

## 2019-01-25 NOTE — PT/OT/SLP PROGRESS
Speech Language Pathology      Zakiya Garcia  MRN: [id number]    Patient not seen today secondary to Nursing hold (resp. status not stable). Will follow-up 1/28/19    Cary Garcia CCC-SLP

## 2019-01-25 NOTE — ASSESSMENT & PLAN NOTE
Unsure if true renal failure or side effect from bactrim  Hold off on metformin   Stopped fluids for pulm edema  BMP in AM  Strict I/Os

## 2019-01-25 NOTE — PROGRESS NOTES
[facility] Hospital Medicine  Progress Note    Patient Name: Zakiya Garcia  MRN: [id number]  Patient Class: IP- Inpatient   Admission Date: 1/23/2019  Length of Stay: 2 days  Attending Physician: Shyann Valdes MD  Primary Care Provider: Ines Barragan MD        Subjective:     Principal Problem:Acute respiratory acidosis    HPI:  69 year old female with hypertension, dementia, s/p dorota BKA, hyperlipidemia, smoker and dementia who presented with shortness of breath. Per EMS, patient's sats were 90% at room air then 100% after breathing treatment and a dose of solumedrol 125 mg IM. Patient's son reported gradual worsening of non productive cough as well as emesis after coughing this AM. Chest pain when coughing. Patient was last seen in the ED on 1/14 (~10 days PTA) for a UTI. Was prescribed bactrim BID x 7 days. Patient is not sure if still taking metformin or lisinopril. Son will bring list of meds.     In the ED, patient was tachycardic, with low grade fever, with tachypnea and sat 92% at room air. Lungs clear to auscultation and XRay without evidence of consolidation or edema. Labs showed moderate hyperkalemia, acute renal failure and elevated lactic acid. ABG showed pH of 7.0 with hypercapnia. Patient admitted to ICU for close monitoring given such significant metabolic derangements.     Hospital Course:  No notes on file    Interval History: with more SOB. On BiPAP since last night. Still with resp acidosis. Drowsy.     Review of Systems   Unable to perform ROS: Mental status change     Objective:     Vital Signs (Most Recent):  Temp: 98.7 °F (37.1 °C) (01/25/19 1100)  Pulse: 91 (01/25/19 1500)  Resp: (!) 25 (01/25/19 1500)  BP: (!) 190/74 (01/25/19 1500)  SpO2: 96 % (01/25/19 1500) Vital Signs (24h Range):  Temp:  [98.7 °F (37.1 °C)-99.8 °F (37.7 °C)] 98.7 °F (37.1 °C)  Pulse:  [] 91  Resp:  [18-30] 25  SpO2:  [92 %-100 %] 96 %  BP: (110-190)/(56-90) 190/74     Weight: 49.4 kg (108 lb  14.5 oz)  Body mass index is 19.92 kg/m².    Intake/Output Summary (Last 24 hours) at 1/25/2019 1527  Last data filed at 1/25/2019 1500  Gross per 24 hour   Intake 366.67 ml   Output 890 ml   Net -523.33 ml      Physical Exam   Constitutional: She appears well-developed. No distress.   Cardiovascular: Normal rate and regular rhythm.   Pulmonary/Chest: She has no wheezes. She has no rales.   On BiPAP   Abdominal: Soft. Bowel sounds are normal.   Musculoskeletal: She exhibits no edema.   Bilateral BKA   Neurological:   drowsy   Skin: Skin is warm. She is not diaphoretic.   Nursing note and vitals reviewed.      Significant Labs: All pertinent labs within the past 24 hours have been reviewed.    Significant Imaging: I have reviewed all pertinent imaging results/findings within the past 24 hours.  I have reviewed and interpreted all pertinent imaging results/findings within the past 24 hours.    Assessment/Plan:      * Acute respiratory acidosis    Not much improved with BiPAP. Maybe very slight. Degree of metabolic acidosis cannot account for this  Workup thus far revealed new severely depressed LVEF to 25% with inferoposterior WMA  Trop initially mildly elevated to 0.089 now 2. Patient is chest pain free.   Unknown how long EF has been depressed but per son her SOB has been present for days. MI might not be acute  Has had a coronary stent placed 6 years ago, per son. On ASA daily but smokes cigarettes continuously and follows a high salt diet.   Currently not a good candidate for cardiac catheterization  On IV diuresis, monitoring renal function closely  Will need to hold oral meds as she is currently BiPAP dependent.   Will continue with full dose lovenox in case of PE (D dimer elevated. Further workup limited). Stress ulcer proph added  Took a lot of goody powder. Salicylate induced? Will consider reordering bicarb along with IV diuresis  Prognosis guarded. Might require intubation. Discussed with sons at bedside  who are aware of patient's current condition  Cardiology and pulm on board for co-management          Acute combined systolic and diastolic heart failure    As above       Acute respiratory failure with hypoxia and hypercapnia    As above       Hyperkalemia, diminished renal excretion    2/2 to bactrim vs renal failure vs both  Resolved        NSTEMI (non-ST elevated myocardial infarction)    As above       Acute renal failure    Unsure if true renal failure or side effect from bactrim  Hold off on metformin   Stopped fluids for pulm edema  BMP in AM  Strict I/Os       Urinary tract infection    Abnormal urinalysis  UCx so far now growth  Agree with empiric ceftriaxone       Tobacco use disorder, severe, dependence    Smoking all day long  Spent > 10 minutes discussing tobacco cessation  Patient does not appear to be motivated. Not interested on nicotine patch       Diabetes mellitus type 2 in nonobese    Hold metformin and start insulin  Adjust as needed for goal -180       Hypertension    Not well controlled. Will add cardene drip for better control while NPO         VTE Risk Mitigation (From admission, onward)        Ordered     enoxaparin injection 50 mg  Daily      01/24/19 1652     IP VTE HIGH RISK PATIENT  Once      01/23/19 1630         Discussed with two sons at bedside    Prognosis guarded. May require endotracheal intubation    Critical care time spent on the evaluation and treatment of severe organ dysfunction, review of pertinent labs and imaging studies, discussions with consulting providers and discussions with patient/family: > 45 minutes.    Shyann Lentz MD  Department of Hospital Medicine   [facility]

## 2019-01-25 NOTE — PLAN OF CARE
Problem: Adult Inpatient Plan of Care  Goal: Plan of Care Review  Outcome: Ongoing (interventions implemented as appropriate)  Pt remains in ICU. Kyle catheter placed. Cardiology consulted. Pt now on 3L NC, she is in sinus tach. New medications ordered. Lasix given. Low urine output. Blood glucose has been wdl. Pt seen by SLP today, see note.

## 2019-01-25 NOTE — NURSING
Pt with decreased mentation and increased lethargy this AM, unable to take PO meds. eICU notified, ordered ABG, adjusted BiPAP settings and ordered to keep pt NPO. Asked about PRN IV Bp meds since unable to take NPO. MD ordered to just hold PO meds for now and monitor. See eICARSEN YARBROUGH note.

## 2019-01-25 NOTE — SUBJECTIVE & OBJECTIVE
Interval History: with more SOB. On BiPAP since last night. Still with resp acidosis. Drowsy.     Review of Systems   Unable to perform ROS: Mental status change     Objective:     Vital Signs (Most Recent):  Temp: 98.7 °F (37.1 °C) (01/25/19 1100)  Pulse: 91 (01/25/19 1500)  Resp: (!) 25 (01/25/19 1500)  BP: (!) 190/74 (01/25/19 1500)  SpO2: 96 % (01/25/19 1500) Vital Signs (24h Range):  Temp:  [98.7 °F (37.1 °C)-99.8 °F (37.7 °C)] 98.7 °F (37.1 °C)  Pulse:  [] 91  Resp:  [18-30] 25  SpO2:  [92 %-100 %] 96 %  BP: (110-190)/(56-90) 190/74     Weight: 49.4 kg (108 lb 14.5 oz)  Body mass index is 19.92 kg/m².    Intake/Output Summary (Last 24 hours) at 1/25/2019 1527  Last data filed at 1/25/2019 1500  Gross per 24 hour   Intake 366.67 ml   Output 890 ml   Net -523.33 ml      Physical Exam   Constitutional: She appears well-developed. No distress.   Cardiovascular: Normal rate and regular rhythm.   Pulmonary/Chest: She has no wheezes. She has no rales.   On BiPAP   Abdominal: Soft. Bowel sounds are normal.   Musculoskeletal: She exhibits no edema.   Bilateral BKA   Neurological:   drowsy   Skin: Skin is warm. She is not diaphoretic.   Nursing note and vitals reviewed.      Significant Labs: All pertinent labs within the past 24 hours have been reviewed.    Significant Imaging: I have reviewed all pertinent imaging results/findings within the past 24 hours.  I have reviewed and interpreted all pertinent imaging results/findings within the past 24 hours.

## 2019-01-25 NOTE — EICU
Bedside nurse called to ask for abg for pt who is very lethargic on bipap. Call transferred to Dr. Posey

## 2019-01-25 NOTE — EICU
Lethargic on BiPAP.  Admitted for COPD/low ef CHF/REGLA with type resp failure.  AM labs looks stable.  co2 21.Dementia hx.     Check ABG and POCT  stat.   Aspiration precautions.

## 2019-01-25 NOTE — SUBJECTIVE & OBJECTIVE
Interval History: Drowsy this morning on Bipap.     Review of Systems   Unable to perform ROS: Mental status change     Objective:     Vital Signs (Most Recent):  Temp: 98.7 °F (37.1 °C) (01/25/19 1100)  Pulse: 91 (01/25/19 1500)  Resp: (!) 25 (01/25/19 1500)  BP: (!) 190/74 (01/25/19 1500)  SpO2: 96 % (01/25/19 1500) Vital Signs (24h Range):  Temp:  [98.7 °F (37.1 °C)-99.8 °F (37.7 °C)] 98.7 °F (37.1 °C)  Pulse:  [] 91  Resp:  [18-30] 25  SpO2:  [92 %-100 %] 96 %  BP: (110-190)/(56-90) 190/74     Weight: 49.4 kg (108 lb 14.5 oz)  Body mass index is 19.92 kg/m².    Intake/Output Summary (Last 24 hours) at 1/25/2019 1537  Last data filed at 1/25/2019 1500  Gross per 24 hour   Intake 366.67 ml   Output 890 ml   Net -523.33 ml      Physical Exam   Constitutional: She appears well-developed. No distress.   Cardiovascular: Normal rate and regular rhythm.   Pulmonary/Chest: She has no wheezes. She has no rales.   On BiPAP   Abdominal: Soft. Bowel sounds are normal.   Musculoskeletal: She exhibits no edema.   Bilateral BKA   Neurological:   drowsy   Skin: Skin is warm. She is not diaphoretic.   Nursing note and vitals reviewed.      Significant Labs: All pertinent labs within the past 24 hours have been reviewed.    Significant Imaging: I have reviewed all pertinent imaging results/findings within the past 24 hours.  I have reviewed and interpreted all pertinent imaging results/findings within the past 24 hours.

## 2019-01-25 NOTE — ASSESSMENT & PLAN NOTE
Not much improved with BiPAP. Maybe very slight. Degree of metabolic acidosis cannot account for this  Workup thus far revealed new severely depressed LVEF to 25% with inferoposterior WMA  Trop initially mildly elevated to 0.089 now 2. Patient is chest pain free.   Unknown how long EF has been depressed but per son her SOB has been present for days. MI might not be acute  Has had a coronary stent placed 6 years ago, per son. On ASA daily but smokes cigarettes continuously and follows a high salt diet.   Currently not a good candidate for cardiac catheterization  On IV diuresis, monitoring renal function closely  Will need to hold oral meds as she is currently BiPAP dependent.   Will continue with full dose lovenox in case of PE (D dimer elevated. Further workup limited). Stress ulcer proph added  Took a lot of goody powder. Salicylate induced? Will consider reordering bicarb along with IV diuresis  Prognosis guarded. Might require intubation. Discussed with sons at bedside who are aware of patient's current condition  Cardiology and pulm on board for co-management

## 2019-01-25 NOTE — PROGRESS NOTES
[facility]  Cardiology  Progress Note    Patient Name: Zakiya Garcia  MRN: [id number]  Admission Date: 1/23/2019  Hospital Length of Stay: 2 days  Code Status: Full Code   Attending Physician: Shyann Valdes MD   Primary Care Physician: Ines Barragan MD  Expected Discharge Date:   Principal Problem:Acute respiratory acidosis    Subjective:     Hospital Course:   1-24:  Admitted to the ICU with respiratory failure.    Interval History:  Stable on BiPAP with and denies any chest pain but limited historian due to baseline mental status     Telemetry:  Normal sinus rhythm    Review of Systems   Unable to perform ROS: dementia     Objective:     Vital Signs (Most Recent):  Temp: 99.5 °F (37.5 °C) (01/25/19 0345)  Pulse: 85 (01/25/19 0741)  Resp: (!) 30 (01/25/19 0741)  BP: (!) 160/68 (01/25/19 0700)  SpO2: 95 % (01/25/19 0741) Vital Signs (24h Range):  Temp:  [98.5 °F (36.9 °C)-99.8 °F (37.7 °C)] 99.5 °F (37.5 °C)  Pulse:  [] 85  Resp:  [18-30] 30  SpO2:  [92 %-100 %] 95 %  BP: (110-179)/(56-90) 160/68     Weight: 49.4 kg (108 lb 14.5 oz)  Body mass index is 19.92 kg/m².     SpO2: 95 %  O2 Device (Oxygen Therapy): BiPAP      Intake/Output Summary (Last 24 hours) at 1/25/2019 0854  Last data filed at 1/25/2019 0700  Gross per 24 hour   Intake 910 ml   Output 620 ml   Net 290 ml       Lines/Drains/Airways     Drain                 Urethral Catheter 01/24/19 1050 Straight-tip 16 Fr. less than 1 day          Peripheral Intravenous Line                 Peripheral IV - Single Lumen 01/23/19 0522 Left Forearm 2 days         Peripheral IV - Single Lumen 01/23/19 1230 Right Forearm 1 day                Physical Exam   Constitutional: No distress.   On BiPAP   Eyes: Conjunctivae and EOM are normal. Pupils are equal, round, and reactive to light.   Cardiovascular: Normal rate and regular rhythm.   Pulmonary/Chest:   Course decreased breath sounds   Abdominal: Soft. Bowel sounds are normal.    Musculoskeletal: She exhibits no edema.   Neurological: She is alert.   Skin: Skin is warm and dry.       Significant Labs:   ABG:   Recent Labs   Lab 01/23/19  1641 01/25/19  0625 01/25/19  0741   PH 7.134* 7.176* 7.183*   PCO2 60.8* 61.0* 60.2*   HCO3 20.4* 22.6* 22.7*   POCSATURATED 98 96 92*   BE -9 -6 -6   , BMP:   Recent Labs   Lab 01/24/19  0353 01/24/19  1204 01/25/19  0231    106 104    141 139   K 6.1* 5.5* 4.8   * 112* 108   CO2 20* 19* 21*   BUN 29* 31* 37*   CREATININE 2.5* 2.5* 2.7*   CALCIUM 8.1* 7.8* 7.4*    and CBC   Recent Labs   Lab 01/25/19  0231   WBC 9.14   HGB 8.8*   HCT 29.3*          Significant Imaging: Echocardiogram:   Transthoracic echo (TTE) complete (Cupid Only):   Results for orders placed or performed during the hospital encounter of 01/23/19   Transthoracic echo (TTE) complete (Cupid Only)   Result Value Ref Range    BSA 1.54 m2    LA WIDTH 4.31 cm    AORTIC VALVE CUSP SEPERATION 1.65 cm    PV PEAK VELOCITY 1.00 cm/s    LVIDD 5.04 3.5 - 6.0 cm    IVS 0.72 0.6 - 1.1 cm    PW 0.75 0.6 - 1.1 cm    Ao root annulus 2.42 cm    LVIDS 4.40 (A) 2.1 - 4.0 cm    FS 13 28 - 44 %    LA volume 75.27 cm3    Sinus 2.89 cm    STJ 1.90 cm    Ascending aorta 2.40 cm    LV mass 123.63 g    LA size 4.15 cm    RVDD 3.00 cm    TAPSE 1.75 cm    RV S' 8.72 m/s    Left Ventricle Relative Wall Thickness 0.30 cm    AV mean gradient 2.38 mmHg    AV valve area 2.12 cm2    AV Velocity Ratio 0.73     AV index (prosthetic) 0.65     IVRT 0.08 msec    LVOT diameter 2.04 cm    LVOT area 3.27 cm2    LVOT peak richard [id number] m/s    LVOT peak VTI 12.65 cm    Ao peak richard 1.01 m/s    Ao VTI 19.45 cm    LVOT stroke volume 41.33 cm3    AV peak gradient 4.08 mmHg    MV Peak E Richard 1.36 m/s    TR Max Richard 4.13 m/s    LV Systolic Volume 87.85 mL    LV Systolic Volume Index 57.1 mL/m2    LV Diastolic Volume 120.66 mL    LV Diastolic Volume Index 78.43 mL/m2    LA Volume Index 48.9 mL/m2    LV Mass  Index 80.4 g/m2    RA Major Axis 4.58 cm    Left Atrium Minor Axis 5.23 cm    Left Atrium Major Axis 4.70 cm    Triscuspid Valve Regurgitation Peak Gradient 68.23 mmHg    RA Width 3.32 cm    Right Atrial Pressure (from IVC) 8 mmHg    TV rest pulmonary artery pressure 76 mmHg     Assessment and Plan:     Brief HPI:     * Acute respiratory acidosis    Mgmt per IM/pulm  Significant smoking hx noted     NSTEMI (non-ST elevated myocardial infarction)    Elev trop and EKG changes without chest pain, but +CHF  ?demand in setting of ARF/CHF  Pt's son gives hx of prior ?PCI  Med mgmt for time being  Can consider cath in the future if her sensorium clears and renal fxn improves     Acute combined systolic and diastolic heart failure    EF 25% with inferoposterior WMA suggestive of prior CAD  She seems vol overloaded  Suggest IV lasix and afterload reduction (Start Hydrala/Imdur)  Cont coreg  Stop amlod in favor of hydrala/Imdur  Cont ASA/statin +/- plavix  Given her current MS and ARF, she is not a candidate for cath lab at the present time.  If her MS were to clear and renal fxn improve, we could consider it.     Diabetes mellitus type 2 in nonobese    Per IM     Acute renal failure    Per IM/neph         VTE Risk Mitigation (From admission, onward)        Ordered     enoxaparin injection 50 mg  Daily      01/24/19 1652     IP VTE HIGH RISK PATIENT  Once      01/23/19 1630        Continue current supportive care.  Again poor candidate for catheterization lab with baseline mental status issues and renal insufficiency.  Continue medicines for secondary prevention but otherwise conservative therapy.  We will see as needed.    Benja Contreras MD  Cardiology  [facility] [place]

## 2019-01-25 NOTE — EICU
ABG: type 2 resp failure from worsening resp acidosis and improving met acidosis compared to 23 rd ABG.  PH 7.17.  BG 92.  Ve only 6 and Vt 280, no air leak.  On 15/5/12/30%    Plan:   discussed with RT and bed side RN.    - Changed BiPAP to 18/5 ( more driving pressure) and rate to 14.  Decrease fio2 to 28% - has COPD  - get ABG at 7.30 am.    - hold oral hydralazine for now, until more alert.  sbp 142.

## 2019-01-25 NOTE — PLAN OF CARE
Problem: Adult Inpatient Plan of Care  Goal: Plan of Care Review  Outcome: Ongoing (interventions implemented as appropriate)  Patient remains oriented to self and arouses to voice. VSS, Tmax 99.8 oral, NSR/ST on cardiac monitor. Placed on BiPap while sleeping. 2 L NC while awake. BG not requiring insulin coverage this shift. Serum K within normal range this AM at 4.8. Loose stool x2 this shift. UO  350cc  this shift. Pt repositioned independently in bed. No falls, injury, or skin breakdown this shift. Plan of care reviewed.

## 2019-01-26 PROBLEM — R41.0 DELIRIUM: Status: ACTIVE | Noted: 2019-01-26

## 2019-01-26 LAB
ALBUMIN SERPL BCP-MCNC: 2.9 G/DL
ALLENS TEST: ABNORMAL
ALLENS TEST: ABNORMAL
ALP SERPL-CCNC: 90 U/L
ALT SERPL W/O P-5'-P-CCNC: 30 U/L
ANION GAP SERPL CALC-SCNC: 12 MMOL/L
ANION GAP SERPL CALC-SCNC: 15 MMOL/L
AST SERPL-CCNC: 34 U/L
BILIRUB SERPL-MCNC: 0.1 MG/DL
BUN SERPL-MCNC: 47 MG/DL
BUN SERPL-MCNC: 49 MG/DL
CALCIUM SERPL-MCNC: 7.7 MG/DL
CALCIUM SERPL-MCNC: 7.9 MG/DL
CHLORIDE SERPL-SCNC: 109 MMOL/L
CHLORIDE SERPL-SCNC: 109 MMOL/L
CO2 SERPL-SCNC: 20 MMOL/L
CO2 SERPL-SCNC: 20 MMOL/L
CREAT SERPL-MCNC: 3.1 MG/DL
CREAT SERPL-MCNC: 3.2 MG/DL
CREAT UR-MCNC: 164.1 MG/DL
CREAT UR-MCNC: 164.1 MG/DL
DELSYS: ABNORMAL
DELSYS: ABNORMAL
EP: 5
ERYTHROCYTE [SEDIMENTATION RATE] IN BLOOD BY WESTERGREN METHOD: 14 MM/H
EST. GFR  (AFRICAN AMERICAN): 16 ML/MIN/1.73 M^2
EST. GFR  (AFRICAN AMERICAN): 17 ML/MIN/1.73 M^2
EST. GFR  (NON AFRICAN AMERICAN): 14 ML/MIN/1.73 M^2
EST. GFR  (NON AFRICAN AMERICAN): 14 ML/MIN/1.73 M^2
FIO2: 0.28
FLOW: 2
GLUCOSE SERPL-MCNC: 107 MG/DL
GLUCOSE SERPL-MCNC: 108 MG/DL
HCO3 UR-SCNC: 19.5 MMOL/L (ref 24–28)
HCO3 UR-SCNC: 20.4 MMOL/L (ref 24–28)
IP: 18
MIN VOL: 9.1
MODE: ABNORMAL
MODE: ABNORMAL
PCO2 BLDA: 51.3 MMHG (ref 35–45)
PCO2 BLDA: 52.7 MMHG (ref 35–45)
PH SMN: 7.18 [PH] (ref 7.35–7.45)
PH SMN: 7.21 [PH] (ref 7.35–7.45)
PO2 BLDA: 68 MMHG (ref 80–100)
PO2 BLDA: 72 MMHG (ref 80–100)
POC BE: -7 MMOL/L
POC BE: -8 MMOL/L
POC SATURATED O2: 88 % (ref 95–100)
POC SATURATED O2: 89 % (ref 95–100)
POC TCO2: 21 MMOL/L (ref 23–27)
POC TCO2: 22 MMOL/L (ref 23–27)
POCT GLUCOSE: 103 MG/DL (ref 70–110)
POCT GLUCOSE: 111 MG/DL (ref 70–110)
POCT GLUCOSE: 114 MG/DL (ref 70–110)
POCT GLUCOSE: 121 MG/DL (ref 70–110)
POTASSIUM SERPL-SCNC: 4.4 MMOL/L
POTASSIUM SERPL-SCNC: 4.5 MMOL/L
PROT SERPL-MCNC: 5.8 G/DL
PROT UR-MCNC: 222 MG/DL
PROT/CREAT UR: 1.35 MG/G{CREAT}
SAMPLE: ABNORMAL
SAMPLE: ABNORMAL
SITE: ABNORMAL
SITE: ABNORMAL
SODIUM SERPL-SCNC: 141 MMOL/L
SODIUM SERPL-SCNC: 144 MMOL/L
SODIUM UR-SCNC: 42 MMOL/L
SP02: 96
SPONT RATE: 8
UUN UR-MCNC: 290 MG/DL

## 2019-01-26 PROCEDURE — 80048 BASIC METABOLIC PNL TOTAL CA: CPT

## 2019-01-26 PROCEDURE — 84540 ASSAY OF URINE/UREA-N: CPT

## 2019-01-26 PROCEDURE — 82803 BLOOD GASES ANY COMBINATION: CPT

## 2019-01-26 PROCEDURE — 63600175 PHARM REV CODE 636 W HCPCS: Performed by: INTERNAL MEDICINE

## 2019-01-26 PROCEDURE — 20000000 HC ICU ROOM

## 2019-01-26 PROCEDURE — 99291 PR CRITICAL CARE, E/M 30-74 MINUTES: ICD-10-PCS | Mod: ,,, | Performed by: INTERNAL MEDICINE

## 2019-01-26 PROCEDURE — 25000003 PHARM REV CODE 250: Performed by: INTERNAL MEDICINE

## 2019-01-26 PROCEDURE — 99291 CRITICAL CARE FIRST HOUR: CPT | Mod: ,,, | Performed by: INTERNAL MEDICINE

## 2019-01-26 PROCEDURE — 94640 AIRWAY INHALATION TREATMENT: CPT

## 2019-01-26 PROCEDURE — 36600 WITHDRAWAL OF ARTERIAL BLOOD: CPT

## 2019-01-26 PROCEDURE — 27000221 HC OXYGEN, UP TO 24 HOURS

## 2019-01-26 PROCEDURE — 82570 ASSAY OF URINE CREATININE: CPT

## 2019-01-26 PROCEDURE — 36415 COLL VENOUS BLD VENIPUNCTURE: CPT

## 2019-01-26 PROCEDURE — 84300 ASSAY OF URINE SODIUM: CPT

## 2019-01-26 PROCEDURE — 63600175 PHARM REV CODE 636 W HCPCS: Performed by: EMERGENCY MEDICINE

## 2019-01-26 PROCEDURE — 99900035 HC TECH TIME PER 15 MIN (STAT)

## 2019-01-26 PROCEDURE — 25000242 PHARM REV CODE 250 ALT 637 W/ HCPCS: Performed by: INTERNAL MEDICINE

## 2019-01-26 PROCEDURE — 94660 CPAP INITIATION&MGMT: CPT

## 2019-01-26 PROCEDURE — 94761 N-INVAS EAR/PLS OXIMETRY MLT: CPT

## 2019-01-26 PROCEDURE — 80053 COMPREHEN METABOLIC PANEL: CPT

## 2019-01-26 PROCEDURE — S0028 INJECTION, FAMOTIDINE, 20 MG: HCPCS | Performed by: INTERNAL MEDICINE

## 2019-01-26 RX ORDER — FUROSEMIDE 10 MG/ML
40 INJECTION INTRAMUSCULAR; INTRAVENOUS 2 TIMES DAILY
Status: DISCONTINUED | OUTPATIENT
Start: 2019-01-26 | End: 2019-01-26

## 2019-01-26 RX ORDER — FUROSEMIDE 10 MG/ML
40 INJECTION INTRAMUSCULAR; INTRAVENOUS DAILY
Status: DISCONTINUED | OUTPATIENT
Start: 2019-01-27 | End: 2019-01-26

## 2019-01-26 RX ADMIN — NICARDIPINE HYDROCHLORIDE 7.5 MG/HR: 0.2 INJECTION, SOLUTION INTRAVENOUS at 02:01

## 2019-01-26 RX ADMIN — IPRATROPIUM BROMIDE AND ALBUTEROL SULFATE 3 ML: .5; 3 SOLUTION RESPIRATORY (INHALATION) at 12:01

## 2019-01-26 RX ADMIN — IPRATROPIUM BROMIDE AND ALBUTEROL SULFATE 3 ML: .5; 3 SOLUTION RESPIRATORY (INHALATION) at 03:01

## 2019-01-26 RX ADMIN — FAMOTIDINE 20 MG: 10 INJECTION INTRAVENOUS at 09:01

## 2019-01-26 RX ADMIN — ENOXAPARIN SODIUM 50 MG: 100 INJECTION SUBCUTANEOUS at 05:01

## 2019-01-26 RX ADMIN — IPRATROPIUM BROMIDE AND ALBUTEROL SULFATE 3 ML: .5; 3 SOLUTION RESPIRATORY (INHALATION) at 08:01

## 2019-01-26 RX ADMIN — CEFTRIAXONE 1 G: 1 INJECTION, SOLUTION INTRAVENOUS at 09:01

## 2019-01-26 NOTE — PROGRESS NOTES
[facility] Hospital Medicine  Progress Note    Patient Name: Zakiya Garcia  MRN: [id number]  Patient Class: IP- Inpatient   Admission Date: 1/23/2019  Length of Stay: 3 days  Attending Physician: Shyann Valdes MD  Primary Care Provider: Ines Barragan MD        Subjective:     Principal Problem:Acute respiratory acidosis    HPI:  69 year old female with hypertension, dementia, s/p dorota BKA, hyperlipidemia, smoker and dementia who presented with shortness of breath. Per EMS, patient's sats were 90% at room air then 100% after breathing treatment and a dose of solumedrol 125 mg IM. Patient's son reported gradual worsening of non productive cough as well as emesis after coughing this AM. Chest pain when coughing. Patient was last seen in the ED on 1/14 (~10 days PTA) for a UTI. Was prescribed bactrim BID x 7 days. Patient is not sure if still taking metformin or lisinopril. Son will bring list of meds.     In the ED, patient was tachycardic, with low grade fever, with tachypnea and sat 92% at room air. Lungs clear to auscultation and XRay without evidence of consolidation or edema. Labs showed moderate hyperkalemia, acute renal failure and elevated lactic acid. ABG showed pH of 7.0 with hypercapnia. Patient admitted to ICU for close monitoring given such significant metabolic derangements.     Hospital Course:  No notes on file    Interval History: more alert but still with respiratory acidosis grossly unchanged. Renal function is worse.     Review of Systems   Unable to perform ROS: Mental status change     Objective:     Vital Signs (Most Recent):  Temp: 98.5 °F (36.9 °C) (01/26/19 0800)  Pulse: 86 (01/26/19 0815)  Resp: 20 (01/26/19 0815)  BP: (!) 125/59 (01/26/19 0815)  SpO2: 100 % (01/26/19 0815) Vital Signs (24h Range):  Temp:  [98.5 °F (36.9 °C)-99.2 °F (37.3 °C)] 98.5 °F (36.9 °C)  Pulse:  [] 86  Resp:  [18-45] 20  SpO2:  [90 %-100 %] 100 %  BP: (106-190)/(52-75) 125/59     Weight:  49.4 kg (108 lb 14.5 oz)  Body mass index is 19.92 kg/m².    Intake/Output Summary (Last 24 hours) at 1/26/2019 1004  Last data filed at 1/26/2019 0800  Gross per 24 hour   Intake 616.26 ml   Output 700 ml   Net -83.74 ml      Physical Exam   Constitutional: She appears well-developed. No distress.   HENT:   Dry oral mucosa   Cardiovascular: Normal rate and regular rhythm.   Pulmonary/Chest: She has no wheezes. She has no rales.   On BiPAP   Abdominal: Soft. Bowel sounds are normal.   Musculoskeletal: She exhibits no edema.   Bilateral BKA   Neurological: She is alert.   Oriented    Skin: Skin is warm. She is not diaphoretic.   Nursing note and vitals reviewed.      Significant Labs: All pertinent labs within the past 24 hours have been reviewed.    Significant Imaging: I have reviewed all pertinent imaging results/findings within the past 24 hours.  I have reviewed and interpreted all pertinent imaging results/findings within the past 24 hours.    Assessment/Plan:      * Acute respiratory acidosis    Not much improved with BiPAP. Maybe very slight. Degree of metabolic acidosis cannot account for this  Workup thus far revealed new severely depressed LVEF to 25% with inferoposterior WMA  Trop initially mildly elevated to 0.089 now 2. Patient is chest pain free.   Unknown how long EF has been depressed but per son her SOB has been present for days. MI might not be acute  Has had a coronary stent placed 6 years ago, per son. On ASA daily but smokes cigarettes continuously and follows a high salt diet.   Currently not a good candidate for cardiac catheterization  Diuresed. BP at goal with nicardipine infusion. Adequately diuresed and more alert although still with resp acidosis  Trial of low flow NC and start a diet  Will continue with full dose lovenox in case of PE (D dimer elevated. Further workup limited). Stress ulcer proph added  Took a lot of goody powder. Salicylate induced? Salicylate level is not  elevated  Prognosis guarded. Might require intubation. Discussed with sons at bedside who are aware of patient's current condition  Cardiology and pulm on board for co-management          Acute combined systolic and diastolic heart failure    As above       Acute respiratory failure with hypoxia and hypercapnia    As above       Hyperkalemia, diminished renal excretion    2/2 to bactrim vs renal failure vs both  Resolved        NSTEMI (non-ST elevated myocardial infarction)    As above       Acute renal failure    Unsure if true renal failure or side effect from bactrim. Son also reports taking a lot of goody powder  Hold off on metformin   Stopped fluids for pulm edema. Will now hold off on furosemide as I believe she is over diuresed (dry oral mucosa, better sats, elevated BUN). Kyle in place. Consider nephrology consultation.   BMP in AM  Strict I/Os       Urinary tract infection    Abnormal urinalysis  UCx so far now growth  Agree with empiric ceftriaxone       Tobacco use disorder, severe, dependence    Smoking all day long  Spent > 10 minutes discussing tobacco cessation  Patient does not appear to be motivated. Not interested on nicotine patch       Diabetes mellitus type 2 in nonobese    Hold metformin and start insulin  Adjust as needed for goal -180       Hypertension    Not well controlled. Will add cardene drip for better control while NPO         VTE Risk Mitigation (From admission, onward)        Ordered     enoxaparin injection 50 mg  Daily      01/24/19 1652     IP VTE HIGH RISK PATIENT  Once      01/23/19 1630          Critical care time spent on the evaluation and treatment of severe organ dysfunction, review of pertinent labs and imaging studies, discussions with consulting providers and discussions with patient/family:  > 45 minutes.    Shyann Lentz MD  Department of Hospital Medicine   [facility]

## 2019-01-26 NOTE — ASSESSMENT & PLAN NOTE
Worsening Cr. Consider urine lytes, renal ultrasound and Nephrology consult. Patient may be heading towards HD consideration.

## 2019-01-26 NOTE — PLAN OF CARE
Problem: Adult Inpatient Plan of Care  Goal: Plan of Care Review  Outcome: Ongoing (interventions implemented as appropriate)  Plan of care reviewed. O2 Sat 100% on 2lpm via n/c. Went for head and chest CT this shift. Additionally,had an ultrasound of kidney's and bladder.  Poor urine output this shift. Lasix dc'd. Cardene dc'd at 1150. Pt noted with decreased responsiveness to questions and increased moaning. Refusing to allow BIPAP to be replaced at this time. STAT ABG's ordered per Dr. Lentz. Will call with results when available.

## 2019-01-26 NOTE — ASSESSMENT & PLAN NOTE
Unsure if true renal failure or side effect from bactrim. Son also reports taking a lot of goody powder  Hold off on metformin   Stopped fluids for pulm edema. Will now hold off on furosemide as I believe she is over diuresed (dry oral mucosa, better sats, elevated BUN). Kyle in place. Consider nephrology consultation.   BMP in AM  Strict I/Os

## 2019-01-26 NOTE — PLAN OF CARE
Problem: Adult Inpatient Plan of Care  Goal: Plan of Care Review  Outcome: Ongoing (interventions implemented as appropriate)  Plan of care reviewed. Pt. Has been on BIPAP all shift. Started on Cardene gtt for BP management. Currently on Cardene at 7.5 mg/hr. Incontinent stool episode x1. Mentation varied during the shift. Currently awake and oriented to self. Family at bedside. No falls, or hospital acquired injuries this shift.

## 2019-01-26 NOTE — PROGRESS NOTES
[facility]  Pulmonology  Progress Note    Patient Name: Zakiya Garcia  MRN: [id number]  Admission Date: 1/23/2019  Hospital Length of Stay: 3 days  Code Status: Full Code  Attending Provider: Shyann Valdes MD  Primary Care Provider: Ines Barragan MD   Principal Problem: Acute respiratory acidosis    Subjective:     Interval History: Drowsy this morning on Bipap.     Review of Systems   Unable to perform ROS: Mental status change     Objective:     Vital Signs (Most Recent):  Temp: 98.7 °F (37.1 °C) (01/25/19 1100)  Pulse: 91 (01/25/19 1500)  Resp: (!) 25 (01/25/19 1500)  BP: (!) 190/74 (01/25/19 1500)  SpO2: 96 % (01/25/19 1500) Vital Signs (24h Range):  Temp:  [98.7 °F (37.1 °C)-99.8 °F (37.7 °C)] 98.7 °F (37.1 °C)  Pulse:  [] 91  Resp:  [18-30] 25  SpO2:  [92 %-100 %] 96 %  BP: (110-190)/(56-90) 190/74     Weight: 49.4 kg (108 lb 14.5 oz)  Body mass index is 19.92 kg/m².    Intake/Output Summary (Last 24 hours) at 1/25/2019 1537  Last data filed at 1/25/2019 1500  Gross per 24 hour   Intake 366.67 ml   Output 890 ml   Net -523.33 ml      Physical Exam   Constitutional: She appears well-developed. No distress.   Cardiovascular: Normal rate and regular rhythm.   Pulmonary/Chest: She has no wheezes. She has no rales.   On BiPAP   Abdominal: Soft. Bowel sounds are normal.   Musculoskeletal: She exhibits no edema.   Bilateral BKA   Neurological:   drowsy   Skin: Skin is warm. She is not diaphoretic.   Nursing note and vitals reviewed.      Significant Labs: All pertinent labs within the past 24 hours have been reviewed.    Significant Imaging: I have reviewed all pertinent imaging results/findings within the past 24 hours.  I have reviewed and interpreted all pertinent imaging results/findings within the past 24 hours.    Assessment/Plan:     Delirium    Agree with CT head given change in mental status.      NSTEMI (non-ST elevated myocardial infarction)    Patient with NSTEMI. Cards  following. She has known HFrEF. Diuresis as tolerated.     Acute respiratory failure with hypoxia and hypercapnia    Patient with respiratory and metabolic acidosis. She continues to have decreased mentation. She seems comfortable and not more hypercapnic off Bipap.   -Bipap on for 4 hours and off for 4 hours.      Diabetes mellitus type 2 in nonobese    BG goal 140-180.     Acute renal failure    Worsening Cr. Consider urine lytes, renal ultrasound and Nephrology consult. Patient may be heading towards HD consideration.        Critical Care time: 35 minutes.     Critical Care time for the evaluation and treatment for severe organ dysfunction, review of pertinent labs and imaging studies discussions with consulting services and discussions with patient/family.    Family updated at bedside    Continue ICU care.      Rian Rust MD  Pulmonology  [facility] [place]

## 2019-01-26 NOTE — ASSESSMENT & PLAN NOTE
Patient with respiratory and metabolic acidosis. She continues to have decreased mentation. She seems comfortable and not more hypercapnic off Bipap.   -Bipap on for 4 hours and off for 4 hours.

## 2019-01-26 NOTE — ASSESSMENT & PLAN NOTE
Not much improved with BiPAP. Maybe very slight. Degree of metabolic acidosis cannot account for this  Workup thus far revealed new severely depressed LVEF to 25% with inferoposterior WMA  Trop initially mildly elevated to 0.089 now 2. Patient is chest pain free.   Unknown how long EF has been depressed but per son her SOB has been present for days. MI might not be acute  Has had a coronary stent placed 6 years ago, per son. On ASA daily but smokes cigarettes continuously and follows a high salt diet.   Currently not a good candidate for cardiac catheterization  Diuresed. BP at goal with nicardipine infusion. Adequately diuresed and more alert although still with resp acidosis  Trial of low flow NC and start a diet  Will continue with full dose lovenox in case of PE (D dimer elevated. Further workup limited). Stress ulcer proph added  Took a lot of goody powder. Salicylate induced? Salicylate level is not elevated  Prognosis guarded. Might require intubation. Discussed with sons at bedside who are aware of patient's current condition  Cardiology and pulm on board for co-management

## 2019-01-26 NOTE — PLAN OF CARE
Problem: Adult Inpatient Plan of Care  Goal: Plan of Care Review  Outcome: Ongoing (interventions implemented as appropriate)  Patient remains lethargic and disoriented to place, time and situation. Pt is oriented to self. Maintained on BiPaP 28% FiO2, 18/5 throughout shift. Cardene gtt titrated to maintain SBP less than 130, current rate of 2.5 mg/hr or 12.5 ml/hr. VSS. Afebrile. Pt with BM x2, UO avg 20cc/hr. MD notified. Pt kept NPO per order. Frequent oral care provided. Pt moves independently in bed. No falls, injury or skin breakdown this shift. Plan of care reviewed.

## 2019-01-26 NOTE — SUBJECTIVE & OBJECTIVE
Interval History: more alert but still with respiratory acidosis grossly unchanged. Renal function is worse.     Review of Systems   Unable to perform ROS: Mental status change     Objective:     Vital Signs (Most Recent):  Temp: 98.5 °F (36.9 °C) (01/26/19 0800)  Pulse: 86 (01/26/19 0815)  Resp: 20 (01/26/19 0815)  BP: (!) 125/59 (01/26/19 0815)  SpO2: 100 % (01/26/19 0815) Vital Signs (24h Range):  Temp:  [98.5 °F (36.9 °C)-99.2 °F (37.3 °C)] 98.5 °F (36.9 °C)  Pulse:  [] 86  Resp:  [18-45] 20  SpO2:  [90 %-100 %] 100 %  BP: (106-190)/(52-75) 125/59     Weight: 49.4 kg (108 lb 14.5 oz)  Body mass index is 19.92 kg/m².    Intake/Output Summary (Last 24 hours) at 1/26/2019 1004  Last data filed at 1/26/2019 0800  Gross per 24 hour   Intake 616.26 ml   Output 700 ml   Net -83.74 ml      Physical Exam   Constitutional: She appears well-developed. No distress.   HENT:   Dry oral mucosa   Cardiovascular: Normal rate and regular rhythm.   Pulmonary/Chest: She has no wheezes. She has no rales.   On BiPAP   Abdominal: Soft. Bowel sounds are normal.   Musculoskeletal: She exhibits no edema.   Bilateral BKA   Neurological: She is alert.   Oriented    Skin: Skin is warm. She is not diaphoretic.   Nursing note and vitals reviewed.      Significant Labs: All pertinent labs within the past 24 hours have been reviewed.    Significant Imaging: I have reviewed all pertinent imaging results/findings within the past 24 hours.  I have reviewed and interpreted all pertinent imaging results/findings within the past 24 hours.

## 2019-01-27 LAB
ALBUMIN SERPL BCP-MCNC: 2.8 G/DL
ANION GAP SERPL CALC-SCNC: 12 MMOL/L
BACTERIA #/AREA URNS HPF: ABNORMAL /HPF
BASOPHILS NFR BLD: 0 %
BILIRUB UR QL STRIP: NEGATIVE
BUN SERPL-MCNC: 48 MG/DL
CALCIUM SERPL-MCNC: 8 MG/DL
CHLORIDE SERPL-SCNC: 112 MMOL/L
CLARITY UR: ABNORMAL
CO2 SERPL-SCNC: 20 MMOL/L
COLOR UR: ABNORMAL
CREAT SERPL-MCNC: 2.9 MG/DL
CREAT UR-MCNC: 134 MG/DL
DIFFERENTIAL METHOD: ABNORMAL
EOSINOPHIL NFR BLD: 1 %
ERYTHROCYTE [DISTWIDTH] IN BLOOD BY AUTOMATED COUNT: 15.2 %
EST. GFR  (AFRICAN AMERICAN): 18 ML/MIN/1.73 M^2
EST. GFR  (NON AFRICAN AMERICAN): 16 ML/MIN/1.73 M^2
GLUCOSE SERPL-MCNC: 96 MG/DL
GLUCOSE UR QL STRIP: NEGATIVE
HCT VFR BLD AUTO: 26.9 %
HGB BLD-MCNC: 8.2 G/DL
HGB UR QL STRIP: ABNORMAL
HYALINE CASTS #/AREA URNS LPF: 0 /LPF
KETONES UR QL STRIP: ABNORMAL
LEUKOCYTE ESTERASE UR QL STRIP: ABNORMAL
LYMPHOCYTES NFR BLD: 11 %
MCH RBC QN AUTO: 27.3 PG
MCHC RBC AUTO-ENTMCNC: 30.5 G/DL
MCV RBC AUTO: 90 FL
METAMYELOCYTES NFR BLD MANUAL: 4 %
MICROSCOPIC COMMENT: ABNORMAL
MONOCYTES NFR BLD: 1 %
MYELOCYTES NFR BLD MANUAL: 2 %
NEUTROPHILS NFR BLD: 75 %
NEUTS BAND NFR BLD MANUAL: 6 %
NITRITE UR QL STRIP: NEGATIVE
PH UR STRIP: 5 [PH] (ref 5–8)
PHOSPHATE SERPL-MCNC: 4.1 MG/DL
PLATELET # BLD AUTO: 189 K/UL
PMV BLD AUTO: 10.3 FL
POCT GLUCOSE: 106 MG/DL (ref 70–110)
POCT GLUCOSE: 106 MG/DL (ref 70–110)
POCT GLUCOSE: 81 MG/DL (ref 70–110)
POCT GLUCOSE: 85 MG/DL (ref 70–110)
POCT GLUCOSE: 87 MG/DL (ref 70–110)
POTASSIUM SERPL-SCNC: 4.5 MMOL/L
PROT UR QL STRIP: ABNORMAL
PROT UR-MCNC: 227 MG/DL
PROT/CREAT UR: 1.69 MG/G{CREAT}
RBC # BLD AUTO: 3 M/UL
RBC #/AREA URNS HPF: 35 /HPF (ref 0–4)
SODIUM SERPL-SCNC: 144 MMOL/L
SP GR UR STRIP: 1.01 (ref 1–1.03)
SQUAMOUS #/AREA URNS HPF: 4 /HPF
URN SPEC COLLECT METH UR: ABNORMAL
UROBILINOGEN UR STRIP-ACNC: NEGATIVE EU/DL
WBC # BLD AUTO: 6.3 K/UL
WBC #/AREA URNS HPF: 6 /HPF (ref 0–5)

## 2019-01-27 PROCEDURE — 94660 CPAP INITIATION&MGMT: CPT

## 2019-01-27 PROCEDURE — 63600175 PHARM REV CODE 636 W HCPCS: Performed by: INTERNAL MEDICINE

## 2019-01-27 PROCEDURE — 25000003 PHARM REV CODE 250: Performed by: INTERNAL MEDICINE

## 2019-01-27 PROCEDURE — 81000 URINALYSIS NONAUTO W/SCOPE: CPT

## 2019-01-27 PROCEDURE — 25000242 PHARM REV CODE 250 ALT 637 W/ HCPCS: Performed by: INTERNAL MEDICINE

## 2019-01-27 PROCEDURE — 21400001 HC TELEMETRY ROOM

## 2019-01-27 PROCEDURE — 94640 AIRWAY INHALATION TREATMENT: CPT

## 2019-01-27 PROCEDURE — 84156 ASSAY OF PROTEIN URINE: CPT

## 2019-01-27 PROCEDURE — 80069 RENAL FUNCTION PANEL: CPT

## 2019-01-27 PROCEDURE — 99233 SBSQ HOSP IP/OBS HIGH 50: CPT | Mod: ,,, | Performed by: INTERNAL MEDICINE

## 2019-01-27 PROCEDURE — 36415 COLL VENOUS BLD VENIPUNCTURE: CPT

## 2019-01-27 PROCEDURE — 85007 BL SMEAR W/DIFF WBC COUNT: CPT

## 2019-01-27 PROCEDURE — S0028 INJECTION, FAMOTIDINE, 20 MG: HCPCS | Performed by: INTERNAL MEDICINE

## 2019-01-27 PROCEDURE — 94761 N-INVAS EAR/PLS OXIMETRY MLT: CPT

## 2019-01-27 PROCEDURE — 85027 COMPLETE CBC AUTOMATED: CPT

## 2019-01-27 PROCEDURE — 99900035 HC TECH TIME PER 15 MIN (STAT)

## 2019-01-27 PROCEDURE — 99233 PR SUBSEQUENT HOSPITAL CARE,LEVL III: ICD-10-PCS | Mod: ,,, | Performed by: INTERNAL MEDICINE

## 2019-01-27 RX ORDER — ASPIRIN 81 MG/1
81 TABLET ORAL DAILY
Status: DISCONTINUED | OUTPATIENT
Start: 2019-01-27 | End: 2019-02-05 | Stop reason: HOSPADM

## 2019-01-27 RX ORDER — CLOPIDOGREL BISULFATE 75 MG/1
75 TABLET ORAL DAILY
Status: DISCONTINUED | OUTPATIENT
Start: 2019-01-27 | End: 2019-02-05 | Stop reason: HOSPADM

## 2019-01-27 RX ORDER — ASPIRIN 81 MG/1
81 TABLET ORAL DAILY
Status: DISCONTINUED | OUTPATIENT
Start: 2019-01-27 | End: 2019-01-27

## 2019-01-27 RX ORDER — CARVEDILOL 6.25 MG/1
25 TABLET ORAL 2 TIMES DAILY
Status: DISCONTINUED | OUTPATIENT
Start: 2019-01-27 | End: 2019-02-05 | Stop reason: HOSPADM

## 2019-01-27 RX ORDER — ATORVASTATIN CALCIUM 40 MG/1
40 TABLET, FILM COATED ORAL DAILY
Status: DISCONTINUED | OUTPATIENT
Start: 2019-01-28 | End: 2019-02-05 | Stop reason: HOSPADM

## 2019-01-27 RX ADMIN — IPRATROPIUM BROMIDE AND ALBUTEROL SULFATE 3 ML: .5; 3 SOLUTION RESPIRATORY (INHALATION) at 03:01

## 2019-01-27 RX ADMIN — IPRATROPIUM BROMIDE AND ALBUTEROL SULFATE 3 ML: .5; 3 SOLUTION RESPIRATORY (INHALATION) at 11:01

## 2019-01-27 RX ADMIN — CEFTRIAXONE 1 G: 1 INJECTION, SOLUTION INTRAVENOUS at 08:01

## 2019-01-27 RX ADMIN — IPRATROPIUM BROMIDE AND ALBUTEROL SULFATE 3 ML: .5; 3 SOLUTION RESPIRATORY (INHALATION) at 07:01

## 2019-01-27 RX ADMIN — CARVEDILOL 25 MG: 6.25 TABLET, FILM COATED ORAL at 11:01

## 2019-01-27 RX ADMIN — METHYLPREDNISOLONE SODIUM SUCCINATE 40 MG: 40 INJECTION, POWDER, FOR SOLUTION INTRAMUSCULAR; INTRAVENOUS at 10:01

## 2019-01-27 RX ADMIN — CLOPIDOGREL BISULFATE 75 MG: 75 TABLET ORAL at 11:01

## 2019-01-27 RX ADMIN — ASPIRIN 81 MG: 81 TABLET, COATED ORAL at 11:01

## 2019-01-27 RX ADMIN — FAMOTIDINE 20 MG: 10 INJECTION INTRAVENOUS at 08:01

## 2019-01-27 NOTE — PLAN OF CARE
Problem: Adult Inpatient Plan of Care  Goal: Plan of Care Review  Outcome: Ongoing (interventions implemented as appropriate)  Patient remains lethargic and disoriented to time, place, and situation. She is oriented to self. VSS, afebrile. No BM, UO approx 275cc. MDs aware. Maintained on BiPaP overnight, transitioned to 2L nasal cannula in AM. No injury, skin breakdown, or falls this shift. Plan of care reviewed.

## 2019-01-27 NOTE — SUBJECTIVE & OBJECTIVE
Interval History: renal function slightly better. Is stable on low flow NC. Is wheezing today. Appetite is poor.     Review of Systems   Respiratory: Negative.    Cardiovascular: Negative.    Gastrointestinal: Negative.      Objective:     Vital Signs (Most Recent):  Temp: 97.7 °F (36.5 °C) (01/27/19 1200)  Pulse: 91 (01/27/19 1300)  Resp: (!) 21 (01/27/19 1300)  BP: (!) 151/63 (01/27/19 1300)  SpO2: 100 % (01/27/19 1300) Vital Signs (24h Range):  Temp:  [97.7 °F (36.5 °C)-98.9 °F (37.2 °C)] 97.7 °F (36.5 °C)  Pulse:  [] 91  Resp:  [18-48] 21  SpO2:  [93 %-100 %] 100 %  BP: (118-163)/(55-75) 151/63     Weight: 49.4 kg (108 lb 14.5 oz)  Body mass index is 19.92 kg/m².    Intake/Output Summary (Last 24 hours) at 1/27/2019 1342  Last data filed at 1/27/2019 1200  Gross per 24 hour   Intake --   Output 445 ml   Net -445 ml      Physical Exam   Constitutional: She appears well-developed. No distress.   HENT:   Dry oral mucosa   Cardiovascular: Normal rate and regular rhythm.   Pulmonary/Chest: No respiratory distress. She has wheezes. She has no rales.   On NC   Abdominal: Soft. Bowel sounds are normal.   Musculoskeletal: She exhibits no edema.   Bilateral BKA   Neurological: She is alert.   Oriented    Skin: Skin is warm. She is not diaphoretic.   Nursing note and vitals reviewed.      Significant Labs: All pertinent labs within the past 24 hours have been reviewed.    Significant Imaging: I have reviewed all pertinent imaging results/findings within the past 24 hours.  I have reviewed and interpreted all pertinent imaging results/findings within the past 24 hours.

## 2019-01-27 NOTE — NURSING TRANSFER
Nursing Transfer Note      1/27/2019     Transfer To: Telemetry     Transfer via bed    Transfer with  to O2, cardiac monitoring    Transported by ICU RN and transport    Medicines sent: none    Chart send with patient: Yes    Notified: son    Patient reassessed at:  (1/27/2019)    Upon arrival to floor: cardiac monitor applied, patient oriented to room, call bell in reach and bed in lowest position

## 2019-01-27 NOTE — CONSULTS
REQUESTING CONSULT:  Shyann Valdes MD    REASON FOR CONSULTATION:  Acute renal failure, respiratory failure.    HISTORY OF PRESENT ILLNESS:  This is a 69-year-old female with no prior history   of kidney disease per the patient.  She presented to [facility] with   concerns of having shortness of breath.  She does have a history of asthma.  She   also recently was treated for UTI with Bactrim.  In addition, she has a history   of taking questionable metformin and lisinopril and also extensive Goody powder   and hydrochlorothiazide.  Upon evaluation here, she was noted to have concerns   of elevated BUN and creatinine with hyperkalemia.  On the date of consult, her   potassium initially was 5.9 with a BUN and creatinine 20 and 2.6.  On day of   consult, she is still 48 and 2.9.  We were asked to provide assistance in   regards to the renal failure.    PAST MEDICAL HISTORY:  Significant for diabetes type 2, hypertension, history of   dementia.  Reactive airway disease.    PAST SURGICAL HISTORY:  Significant for bilateral BKA, cardiac stents,   hysterectomy.    SOCIAL HISTORY:  Pertinent for tobacco abuse.    FAMILY HISTORY:  Noncontributory.    REVIEW OF SYSTEMS:  A 10-point review of systems noted for mental status   changes, shortness of breath, cough, wheezing, vomiting.    CURRENT MEDICATIONS:  DuoNeb, Lovenox, Pepcid, Solu-Medrol, and Rocephin.    PHYSICAL EXAMINATION:  VITAL SIGNS:  Pulse 94, respirations 23, blood pressure 152/67, temperature 98.  GENERAL:  Well-developed, thin female in no acute distress.  HEENT:  Normocephalic.  Extraocular muscles intact.  Moist mucous membranes.  NECK:  Supple.  CARDIOVASCULAR:  S1, S2, regular.  PULMONARY:  Decreased at the bases.  ABDOMEN:  Positive bowel sounds, soft, nondistended.  EXTREMITIES:  Shows no edema.  Bilateral BKA.  NEUROLOGIC:  Awake.    LABORATORY DATA:  White count 6.3, H and H 8.2 and 26.9, platelet count 189.    Sodium 144, potassium 4.5,  bicarbonate is 20, BUN and creatinine is 40 and 2.9,   calcium 8, albumin is 2.8.  Lactic acid initially was 2.3,less than 5.    Hemoglobin A1c was 5.3.  UA showed 1.35 protein-creatinine ratio concerning for   UTI.  Cultures from the urine, no growth to date.  Last gas was 7.2, 51, 68.    Ultrasound of the kidney shows right kidney 9.8, left kidney 9.5.  CT of the head showed no acute bleed.  CT of the chest showed pneumonia.    ASSESSMENT AND PLAN:  1.  Acute kidney injury, suspect this is secondary to combination of medications   and state.  No acute indication for dialysis at this time.  She seems to be   improving.  I would avoid Bactrim, lisinopril, metformin in this patient and   that she needs to stay off of Goody powders.  2.  Acute respiratory failure.  The patient is doing better now.  Follow off   BiPAP.  Continue Nasocare.  3.  Diabetes type 2, following clinically.  4.  Metabolic acidosis. Following the bicarb of  the patient.  4.  Non-ST elevation myocardial infarction.  Recommend discontinuing the heparin   and receive Lovenox as possible.  I would avoid ACE, ARBs in this patient for   now.  The patient is at high-risk for worsening REGLA, left coronary heart   catheterization.  5.  Anemia.  We will check stores.  6.  Hypertension.  No ACE or ARBs currently indicated at this time.  In   addition, the patient should avoid Coca-Cola, Goody powder and smoking.  7.  Tobacco abuse.  Advised to quit smoking.          /praveen [id number] eb(s)        JORDY  dd: 01/27/2019 10:10:21 (CST)  td: 01/27/2019 21:59:20 (CST)  Doc ID   #[id number]  Job ID #[id number]    CC:     regla  Acute resp failure  dm2  nstemi  aneia  htn   Proteinuria-chari ck  tob abuse  [id number]

## 2019-01-27 NOTE — PROVIDER TRANSFER
ICU transfer note    Ms Garcia presented with shortness of breath and decreased appetite. Workup in the ED significant for moderate hyperkalemia (6.4), acute renal failure, elevated lactic acid (2.3) and acute respiratory acidosis (pH of 7, pCO2 of 74.4). Patient was surprisingly very alert and conversational, but lab results very concerning. ABG without significant change despite BiPAP. Admitted to ICU for close observation. Pulmonology and nephrology consulted. Respiratory acidosis persisted despite multiple attempts on BiPAP. Degree of metabolic acidosis is not severe enough to be main cause. Furosemide and bicarb did not help much either. Further workup revealed elevated trop of 2 (0.08 in the ED) and LVEF of 25% with global hypokinesis with inferoposterior WMA. This is new. Is not good candidate for catheterization, therefore treating medically. May also have undiagnosed emphysema. Hyperkalemia resolved with shifting and by using kayexalate. Patient clinically improved although not back to baseline. Short course of steroids added to duo-nebs for wheezing, wean low flow NC at tolerated and use BiPAP QHS. Appetite is poor. Patient was strongly advised to quit smoking (smokes continuously all day, per son), taking so much goody powder and stop carbonated drinks (drinks coke all day). Patient does not appear to be motivated to quit any. May need 2-3 more days. Cardiology and pulm signed off. Nephrology following. Wait for clearance from nephro as she may need maintenance diuresis at home. Not sure if she will need home O2.

## 2019-01-27 NOTE — PROGRESS NOTES
[facility] Hospital Medicine  Progress Note    Patient Name: Zakiya Garcia  MRN: [id number]  Patient Class: IP- Inpatient   Admission Date: 1/23/2019  Length of Stay: 4 days  Attending Physician: Shyann Valdes MD  Primary Care Provider: Ines Barragan MD        Subjective:     Principal Problem:Acute respiratory acidosis    HPI:  69 year old female with hypertension, dementia, s/p dorota BKA, hyperlipidemia, smoker and dementia who presented with shortness of breath. Per EMS, patient's sats were 90% at room air then 100% after breathing treatment and a dose of solumedrol 125 mg IM. Patient's son reported gradual worsening of non productive cough as well as emesis after coughing this AM. Chest pain when coughing. Patient was last seen in the ED on 1/14 (~10 days PTA) for a UTI. Was prescribed bactrim BID x 7 days. Patient is not sure if still taking metformin or lisinopril. Son will bring list of meds.     In the ED, patient was tachycardic, with low grade fever, with tachypnea and sat 92% at room air. Lungs clear to auscultation and XRay without evidence of consolidation or edema. Labs showed moderate hyperkalemia, acute renal failure and elevated lactic acid. ABG showed pH of 7.0 with hypercapnia. Patient admitted to ICU for close monitoring given such significant metabolic derangements.     Hospital Course:  Ms Garcia presented with shortness of breath and decreased appetite. Workup in the ED significant for moderate hyperkalemia (6.4), acute renal failure, elevated lactic acid (2.3) and acute respiratory acidosis (pH of 7, pCO2 of 74.4). Patient was surprisingly very alert and conversational, but lab results very concerning. ABG without significant change despite BiPAP. Admitted to ICU for close observation. Pulmonology and nephrology consulted. Respiratory acidosis persisted despite multiple attempts on BiPAP. Degree of metabolic acidosis is not severe enough to be main cause. Furosemide and  bicarb did not help much either. Further workup revealed elevated trop of 2 (0.08 in the ED) and LVEF of 25% with global hypokinesis with inferoposterior WMA. This is new. May also have undiagnosed emphysema. Hyperkalemia resolved with shifting and by using kayexalate. Patient clinically improved although not back to baseline. Short course of steroids added to duo-nebs for wheezing. Appetite is poor. Patient was strongly advised to quit smoking (smokes continuously all day, per son), taking so much goody powder and stop carbonated drinks (drinks coke all day). Patient does not appear to be motivated to quit any. Stepped down to floor on 1/27.     Interval History: renal function slightly better. Is stable on low flow NC. Is wheezing today. Appetite is poor.     Review of Systems   Respiratory: Negative.    Cardiovascular: Negative.    Gastrointestinal: Negative.      Objective:     Vital Signs (Most Recent):  Temp: 97.7 °F (36.5 °C) (01/27/19 1200)  Pulse: 91 (01/27/19 1300)  Resp: (!) 21 (01/27/19 1300)  BP: (!) 151/63 (01/27/19 1300)  SpO2: 100 % (01/27/19 1300) Vital Signs (24h Range):  Temp:  [97.7 °F (36.5 °C)-98.9 °F (37.2 °C)] 97.7 °F (36.5 °C)  Pulse:  [] 91  Resp:  [18-48] 21  SpO2:  [93 %-100 %] 100 %  BP: (118-163)/(55-75) 151/63     Weight: 49.4 kg (108 lb 14.5 oz)  Body mass index is 19.92 kg/m².    Intake/Output Summary (Last 24 hours) at 1/27/2019 1342  Last data filed at 1/27/2019 1200  Gross per 24 hour   Intake --   Output 445 ml   Net -445 ml      Physical Exam   Constitutional: She appears well-developed. No distress.   HENT:   Dry oral mucosa   Cardiovascular: Normal rate and regular rhythm.   Pulmonary/Chest: No respiratory distress. She has wheezes. She has no rales.   On NC   Abdominal: Soft. Bowel sounds are normal.   Musculoskeletal: She exhibits no edema.   Bilateral BKA   Neurological: She is alert.   Oriented    Skin: Skin is warm. She is not diaphoretic.   Nursing note and vitals  reviewed.      Significant Labs: All pertinent labs within the past 24 hours have been reviewed.    Significant Imaging: I have reviewed all pertinent imaging results/findings within the past 24 hours.  I have reviewed and interpreted all pertinent imaging results/findings within the past 24 hours.    Assessment/Plan:      * Acute respiratory acidosis    Not much improved with BiPAP. Maybe very slight. Degree of metabolic acidosis cannot account for this  Workup thus far revealed new severely depressed LVEF to 25% with inferoposterior WMA  Trop initially mildly elevated to 0.089 now 2. Patient is chest pain free.   Unknown how long EF has been depressed but per son her SOB has been present for days. MI might not be acute  Has had a coronary stent placed 6 years ago, per son. On ASA daily but smokes cigarettes continuously and follows a high salt diet.   Currently not a good candidate for cardiac catheterization  Diuresed. BP at goal. Adequately diuresed and more alert although still with some resp acidosis  Took a lot of goody powder. Salicylate induced? Salicylate level is not elevated  Continue on low flow NC for now. Must quit smoking. Will test for home O2 prior to discharge  Cardiology and pulm on board for co-management          Acute combined systolic and diastolic heart failure    As above       Acute respiratory failure with hypoxia and hypercapnia    As above       Hyperkalemia, diminished renal excretion    2/2 to bactrim vs renal failure vs both  Resolved        NSTEMI (non-ST elevated myocardial infarction)    As above       Acute renal failure    Unsure if true renal failure or side effect from bactrim. Son also reports taking a lot of goody powder  Hold off on metformin   Stopped fluids for pulm edema. Holding furosemide as I believe she was over diuresed (dry oral mucosa, better sats, elevated BUN). Renal function improved some with this. Nephrology on board for co-management   BMP in AM  Strict  I/Os       Abnormal urinalysis    Abnormal urinalysis  UCx negative  Stop ceftriaxone       Tobacco use disorder, severe, dependence    Smoking all day long  Spent > 10 minutes discussing tobacco cessation  Patient does not appear to be motivated. Not interested on nicotine patch       Diabetes mellitus type 2 in nonobese    Hold metformin and start insulin  Adjust as needed for goal -180       Hypertension    Now better controlled  Restart carvedilol. Will add nifedipine if needed for goal SBP < 130         VTE Risk Mitigation (From admission, onward)        Ordered     IP VTE HIGH RISK PATIENT  Once      01/23/19 1630        Plan discussed with patient's son at bedside.    Stable for step down to floor today.     Critical care time spent on the evaluation and treatment of severe organ dysfunction, review of pertinent labs and imaging studies, discussions with consulting providers and discussions with patient/family: > 45 minutes.    Shyann Lentz MD  Department of Hospital Medicine   [facility]

## 2019-01-27 NOTE — ASSESSMENT & PLAN NOTE
Unsure if true renal failure or side effect from bactrim. Son also reports taking a lot of goody powder  Hold off on metformin   Stopped fluids for pulm edema. Holding furosemide as I believe she was over diuresed (dry oral mucosa, better sats, elevated BUN). Renal function improved some with this. Nephrology on board for co-management   BMP in AM  Strict I/Os

## 2019-01-27 NOTE — NURSING TRANSFER
Nursing Transfer Note      1/27/2019     Transfer To: [address]    Transfer via bed    Transfer with O2 and cardiac monitoring    Transported by transport service and RN    Medicines sent: n/a    Chart send with patient: Yes    Notified: son    Patient reassessed at: 1/27/19 at 1700    Upon arrival to floor: cardiac monitor applied, patient oriented to room, call bell in reach and bed in lowest position

## 2019-01-27 NOTE — ASSESSMENT & PLAN NOTE
Patient with respiratory and metabolic acidosis. . She seems comfortable and not more hypercapnic off Bipap. RML infiltrate vs atelectasis. No signs of ongoing respiratory infection.   -Bipap PRN.   -Comfortable on NC. Hypercapnia improved.

## 2019-01-27 NOTE — SUBJECTIVE & OBJECTIVE
Interval History: Comfortable on NC.     Review of Systems   Unable to perform ROS: Mental status change     Objective:     Vital Signs (Most Recent):  Temp: 98 °F (36.7 °C) (01/27/19 0330)  Pulse: 96 (01/27/19 0750)  Resp: (!) 23 (01/27/19 0750)  BP: (!) 147/67 (01/27/19 0750)  SpO2: 100 % (01/27/19 0750) Vital Signs (24h Range):  Temp:  [98 °F (36.7 °C)-98.9 °F (37.2 °C)] 98 °F (36.7 °C)  Pulse:  [] 96  Resp:  [18-48] 23  SpO2:  [88 %-100 %] 100 %  BP: (114-163)/(54-75) 147/67     Weight: 49.4 kg (108 lb 14.5 oz)  Body mass index is 19.92 kg/m².    Intake/Output Summary (Last 24 hours) at 1/27/2019 0846  Last data filed at 1/27/2019 0800  Gross per 24 hour   Intake 62.5 ml   Output 435 ml   Net -372.5 ml      Physical Exam   Constitutional: She appears well-developed. No distress.   Cardiovascular: Normal rate and regular rhythm.   Pulmonary/Chest: She has no wheezes. She has no rales.   On NC.   Abdominal: Soft. Bowel sounds are normal.   Musculoskeletal: She exhibits no edema.   Bilateral BKA   Neurological:   drowsy   Skin: Skin is warm. She is not diaphoretic.   Nursing note and vitals reviewed.      Significant Labs: All pertinent labs within the past 24 hours have been reviewed.    Significant Imaging: I have reviewed all pertinent imaging results/findings within the past 24 hours.  I have reviewed and interpreted all pertinent imaging results/findings within the past 24 hours.

## 2019-01-27 NOTE — PROGRESS NOTES
[facility]  Pulmonology  Progress Note    Patient Name: Zakiya Garcia  MRN: [id number]  Admission Date: 1/23/2019  Hospital Length of Stay: 4 days  Code Status: Full Code  Attending Provider: Shyann Valdes MD  Primary Care Provider: Ines Barragan MD   Principal Problem: Acute respiratory acidosis    Subjective:     Interval History: Comfortable on NC.     Review of Systems   Unable to perform ROS: Mental status change     Objective:     Vital Signs (Most Recent):  Temp: 98 °F (36.7 °C) (01/27/19 0330)  Pulse: 96 (01/27/19 0750)  Resp: (!) 23 (01/27/19 0750)  BP: (!) 147/67 (01/27/19 0750)  SpO2: 100 % (01/27/19 0750) Vital Signs (24h Range):  Temp:  [98 °F (36.7 °C)-98.9 °F (37.2 °C)] 98 °F (36.7 °C)  Pulse:  [] 96  Resp:  [18-48] 23  SpO2:  [88 %-100 %] 100 %  BP: (114-163)/(54-75) 147/67     Weight: 49.4 kg (108 lb 14.5 oz)  Body mass index is 19.92 kg/m².    Intake/Output Summary (Last 24 hours) at 1/27/2019 0846  Last data filed at 1/27/2019 0800  Gross per 24 hour   Intake 62.5 ml   Output 435 ml   Net -372.5 ml      Physical Exam   Constitutional: She appears well-developed. No distress.   Cardiovascular: Normal rate and regular rhythm.   Pulmonary/Chest: She has no wheezes. She has no rales.   On NC.   Abdominal: Soft. Bowel sounds are normal.   Musculoskeletal: She exhibits no edema.   Bilateral BKA   Neurological:   drowsy   Skin: Skin is warm. She is not diaphoretic.   Nursing note and vitals reviewed.      Significant Labs: All pertinent labs within the past 24 hours have been reviewed.    Significant Imaging: I have reviewed all pertinent imaging results/findings within the past 24 hours.  I have reviewed and interpreted all pertinent imaging results/findings within the past 24 hours.    Assessment/Plan:     Delirium    CT head negative. Awake and alert.      NSTEMI (non-ST elevated myocardial infarction)    Patient with NSTEMI. Cards following. She has known HFrEF. Agree with  holding diuresis.      Acute respiratory failure with hypoxia and hypercapnia    Patient with respiratory and metabolic acidosis. . She seems comfortable and not more hypercapnic off Bipap. RML infiltrate vs atelectasis. No signs of ongoing respiratory infection.   -Bipap PRN.   -Comfortable on NC. Hypercapnia improved.      Diabetes mellitus type 2 in nonobese    BG goal 140-180.       Patient is overall improved. Consider keeping Bipap off.   Will sign off. Please call with questions.      Rian Rust MD  Pulmonology  [facility]

## 2019-01-27 NOTE — HOSPITAL COURSE
Ms Garcia presented with shortness of breath and decreased appetite. Workup in the ED significant for moderate hyperkalemia (6.4), acute renal failure, elevated lactic acid (2.3) and acute respiratory acidosis (pH of 7, pCO2 of 74.4). Patient was surprisingly very alert and conversational, but lab results very concerning. ABG without significant change despite BiPAP. Admitted to ICU for close observation. Pulmonology and nephrology consulted. Respiratory acidosis persisted despite multiple attempts on BiPAP. Degree of metabolic acidosis is not severe enough to be main cause. Furosemide and bicarb did not help much either. Further workup revealed elevated trop of 2 (0.08 in the ED) and LVEF of 25% with global hypokinesis with inferoposterior WMA. This is new. May also have undiagnosed emphysema. Hyperkalemia resolved with shifting and by using kayexalate. Patient clinically improved although not back to baseline. Short course of steroids added to duo-nebs for wheezing. Appetite is poor. Patient was strongly advised to quit smoking (smokes continuously all day, per son), taking so much goody powder and stop carbonated drinks (drinks coke all day). Patient does not appear to be motivated to quit any. Stepped down to floor on 1/27.  PT/OT consulted. Neurology consulted on 1/28 for AMS- thought to be metabolic encephalopathy. Palliative care was consulted on 1/30/19 as patient was not eating and prognosis over-all was poor.  The patient became bi-pap dependent. The patient seemed to have a general decline and on the morning of 2/1/19, I attempted to call both sons to discuss code status.  Palliative care has been asking the family to come to an agreement with a recommendation of DNR.  Family agreed to DNR on 2/1/19. Further, hospice was being considered by the family. There was a delay in decision due to out of town family members. Palliative care continued to follow as well.  Patient is more awake and alert on 2/5 and  tolerating oxygen by nasal cannula. She confirms DNR status and wants hospice. Will arrange for home oxygen (SpO2 97% on room air at rest, unable to exercise, needs for hospice care), home BiPAP, and home hospital bed. Discharge to home with hospice.

## 2019-01-27 NOTE — ASSESSMENT & PLAN NOTE
Not much improved with BiPAP. Maybe very slight. Degree of metabolic acidosis cannot account for this  Workup thus far revealed new severely depressed LVEF to 25% with inferoposterior WMA  Trop initially mildly elevated to 0.089 now 2. Patient is chest pain free.   Unknown how long EF has been depressed but per son her SOB has been present for days. MI might not be acute  Has had a coronary stent placed 6 years ago, per son. On ASA daily but smokes cigarettes continuously and follows a high salt diet.   Currently not a good candidate for cardiac catheterization  Diuresed. BP at goal. Adequately diuresed and more alert although still with some resp acidosis  Took a lot of goody powder. Salicylate induced? Salicylate level is not elevated  Continue on low flow NC for now. Must quit smoking. Will test for home O2 prior to discharge  Cardiology and pulm on board for co-management

## 2019-01-28 LAB
ALBUMIN SERPL BCP-MCNC: 3 G/DL
ANION GAP SERPL CALC-SCNC: 14 MMOL/L
BACTERIA BLD CULT: NORMAL
BACTERIA BLD CULT: NORMAL
BUN SERPL-MCNC: 51 MG/DL
CALCIUM SERPL-MCNC: 8.7 MG/DL
CHLORIDE SERPL-SCNC: 113 MMOL/L
CO2 SERPL-SCNC: 23 MMOL/L
CREAT SERPL-MCNC: 2.5 MG/DL
EST. GFR  (AFRICAN AMERICAN): 22 ML/MIN/1.73 M^2
EST. GFR  (NON AFRICAN AMERICAN): 19 ML/MIN/1.73 M^2
FERRITIN SERPL-MCNC: 70 NG/ML
GLUCOSE SERPL-MCNC: 114 MG/DL
IRON SERPL-MCNC: 20 UG/DL
PHOSPHATE SERPL-MCNC: 4.3 MG/DL
POCT GLUCOSE: 136 MG/DL (ref 70–110)
POCT GLUCOSE: 158 MG/DL (ref 70–110)
POCT GLUCOSE: 220 MG/DL (ref 70–110)
POCT GLUCOSE: 251 MG/DL (ref 70–110)
POTASSIUM SERPL-SCNC: 5.5 MMOL/L
SATURATED IRON: 7 %
SODIUM SERPL-SCNC: 150 MMOL/L
TOTAL IRON BINDING CAPACITY: 284 UG/DL
TRANSFERRIN SERPL-MCNC: 192 MG/DL

## 2019-01-28 PROCEDURE — 83540 ASSAY OF IRON: CPT

## 2019-01-28 PROCEDURE — 25000003 PHARM REV CODE 250: Performed by: INTERNAL MEDICINE

## 2019-01-28 PROCEDURE — 63600175 PHARM REV CODE 636 W HCPCS: Performed by: INTERNAL MEDICINE

## 2019-01-28 PROCEDURE — 82728 ASSAY OF FERRITIN: CPT

## 2019-01-28 PROCEDURE — 36415 COLL VENOUS BLD VENIPUNCTURE: CPT

## 2019-01-28 PROCEDURE — 80069 RENAL FUNCTION PANEL: CPT

## 2019-01-28 PROCEDURE — S0028 INJECTION, FAMOTIDINE, 20 MG: HCPCS | Performed by: INTERNAL MEDICINE

## 2019-01-28 PROCEDURE — 99222 PR INITIAL HOSPITAL CARE,LEVL II: ICD-10-PCS | Mod: ,,, | Performed by: PSYCHIATRY & NEUROLOGY

## 2019-01-28 PROCEDURE — 25000242 PHARM REV CODE 250 ALT 637 W/ HCPCS: Performed by: INTERNAL MEDICINE

## 2019-01-28 PROCEDURE — 99222 1ST HOSP IP/OBS MODERATE 55: CPT | Mod: ,,, | Performed by: PSYCHIATRY & NEUROLOGY

## 2019-01-28 PROCEDURE — 27000221 HC OXYGEN, UP TO 24 HOURS

## 2019-01-28 PROCEDURE — 21400001 HC TELEMETRY ROOM

## 2019-01-28 PROCEDURE — 94760 N-INVAS EAR/PLS OXIMETRY 1: CPT

## 2019-01-28 PROCEDURE — 94660 CPAP INITIATION&MGMT: CPT

## 2019-01-28 PROCEDURE — 99900035 HC TECH TIME PER 15 MIN (STAT)

## 2019-01-28 PROCEDURE — 94640 AIRWAY INHALATION TREATMENT: CPT

## 2019-01-28 RX ORDER — LORAZEPAM 2 MG/ML
2 INJECTION INTRAMUSCULAR EVERY 4 HOURS PRN
Status: DISCONTINUED | OUTPATIENT
Start: 2019-01-28 | End: 2019-01-30

## 2019-01-28 RX ORDER — SODIUM CHLORIDE 9 MG/ML
INJECTION, SOLUTION INTRAVENOUS CONTINUOUS
Status: DISCONTINUED | OUTPATIENT
Start: 2019-01-28 | End: 2019-01-28

## 2019-01-28 RX ORDER — DEXTROSE MONOHYDRATE 50 MG/ML
INJECTION, SOLUTION INTRAVENOUS CONTINUOUS
Status: DISCONTINUED | OUTPATIENT
Start: 2019-01-28 | End: 2019-01-29

## 2019-01-28 RX ADMIN — METHYLPREDNISOLONE SODIUM SUCCINATE 40 MG: 40 INJECTION, POWDER, FOR SOLUTION INTRAMUSCULAR; INTRAVENOUS at 08:01

## 2019-01-28 RX ADMIN — DEXTROSE: 5 SOLUTION INTRAVENOUS at 02:01

## 2019-01-28 RX ADMIN — METHYLPREDNISOLONE SODIUM SUCCINATE 40 MG: 40 INJECTION, POWDER, FOR SOLUTION INTRAMUSCULAR; INTRAVENOUS at 10:01

## 2019-01-28 RX ADMIN — LORAZEPAM 2 MG: 2 INJECTION, SOLUTION INTRAMUSCULAR; INTRAVENOUS at 12:01

## 2019-01-28 RX ADMIN — IPRATROPIUM BROMIDE AND ALBUTEROL SULFATE 3 ML: .5; 3 SOLUTION RESPIRATORY (INHALATION) at 07:01

## 2019-01-28 RX ADMIN — FAMOTIDINE 20 MG: 10 INJECTION INTRAVENOUS at 09:01

## 2019-01-28 RX ADMIN — IPRATROPIUM BROMIDE AND ALBUTEROL SULFATE 3 ML: .5; 3 SOLUTION RESPIRATORY (INHALATION) at 03:01

## 2019-01-28 RX ADMIN — SODIUM CHLORIDE: 0.9 INJECTION, SOLUTION INTRAVENOUS at 11:01

## 2019-01-28 RX ADMIN — IPRATROPIUM BROMIDE AND ALBUTEROL SULFATE 3 ML: .5; 3 SOLUTION RESPIRATORY (INHALATION) at 11:01

## 2019-01-28 NOTE — PLAN OF CARE
"TN spoke with patient's son Arsenio to introduce self as  and to explain roles of case management. TN reviewed   "[facility]", "Discharge Planning Begins on Admission"  and discussed "Help at Home". Arsenio stated patient lives with him at home. Patient will discharge home with home health when ready for discharge. TN also discussed patient's responsibilities to manage her health at home.  Contact information added to white board.         01/28/19 4347   Discharge Reassessment   Assessment Type Discharge Planning Reassessment   Provided patient/caregiver education on the expected discharge date and the discharge plan Yes   Do you have any problems affording any of your prescribed medications? Yes   Discharge Plan A Home;Home with family;Home Health   Discharge Plan B Home;Home with family;Home Health   Can the patient answer the patient profile reliably? No, cognitively impaired   How does the patient rate their overall health at the present time? Fair   Describe the patient's ability to walk at the present time. Does not walk or unable to take any steps at all   How often would a person be available to care for the patient? Often   Number of comorbid conditions (as recorded on the chart) Five or more   Post-Acute Status   Post-Acute Authorization Placement         "

## 2019-01-28 NOTE — NURSING
Patient resting in bed quietly. NAD noted. No c/o pain. Fall and safety precautions maintained. Bed alarm activated and audible. Bed locked in the lowest position, with side rails up x 2. Call bell and personal items within reach.

## 2019-01-28 NOTE — NURSING
2200- Medication not given, pt not alert.    0735- Report given to Marilyn ZAPATA. Pt resting comfortably. No acute distress noted. Safety precautions maintained.    Chart check completed.

## 2019-01-28 NOTE — CONSULTS
[facility]  Neurology  Consult Note    Patient Name: Zakiya Garcia  MRN: [id number]  Admission Date: 1/23/2019  Hospital Length of Stay: 5 days  Code Status: Full Code   Attending Provider: Jay Resendiz MD   Consulting Provider: Chuy Castillo MD  Primary Care Physician: Ines Barragan MD  Principal Problem:Acute respiratory acidosis    Consults  Subjective:     Chief Complaint/Reason for consult: AMS     HPI: 70 y/o female with medical Hx as listed below comes to ED for shortness of breath. Workup found to have acute resp acidosis as well as heart failure (EF of 25%). On admission of pt was alert and talking but she has becoming increasingly lethargic over several days. Today pt was very restless and was given IV lorazepam. On convulsions reported. No unilateral weakness.    Past Medical History:   Diagnosis Date    Amputee, below knee bilateral    Cigarette smoker     Dementia     Diabetes mellitus     Hypertension     Requires assistance with activities of daily living (ADL)     Wheelchair dependent        Past Surgical History:   Procedure Laterality Date    bka      cardiac stents      HYSTERECTOMY         Review of patient's allergies indicates:  No Known Allergies    Current Neurological Medications:     No current facility-administered medications on file prior to encounter.      Current Outpatient Medications on File Prior to Encounter   Medication Sig    amlodipine (NORVASC) 10 MG tablet Take 10 mg by mouth once daily.    atorvastatin (LIPITOR) 20 MG tablet Take 20 mg by mouth every evening.    carvedilol (COREG) 6.25 MG tablet Take 6.25 mg by mouth 2 (two) times daily.    donepezil (ARICEPT) 5 MG tablet Take 5 mg by mouth every evening.    hydrochlorothiazide (HYDRODIURIL) 25 MG tablet Take 25 mg by mouth once daily.    lisinopril (PRINIVIL,ZESTRIL) 5 MG tablet Take 5 mg by mouth once daily.      Family History     None        Tobacco Use    Smoking status:  Current Every Day Smoker     Packs/day: 1.00     Years: 15.00     Pack years: 15.00     Types: Cigarettes    Smokeless tobacco: Never Used   Substance and Sexual Activity    Alcohol use: No    Drug use: No    Sexual activity: Not on file     Review of Systems   Unable to perform ROS: Mental status change     Objective:     Vital Signs (Most Recent):  Temp: 98.2 °F (36.8 °C) (01/28/19 1116)  Pulse: 92 (01/28/19 1527)  Resp: 20 (01/28/19 1527)  BP: (!) 142/65 (01/28/19 1116)  SpO2: 99 % (01/28/19 1527) Vital Signs (24h Range):  Temp:  [97.7 °F (36.5 °C)-99 °F (37.2 °C)] 98.2 °F (36.8 °C)  Pulse:  [81-98] 92  Resp:  [16-22] 20  SpO2:  [97 %-100 %] 99 %  BP: (142-155)/(64-73) 142/65     Weight: 49.4 kg (108 lb 14.5 oz)  Body mass index is 19.92 kg/m².    Physical Exam   Constitutional: No distress.   HENT:   Head: Normocephalic.   Eyes: Right eye exhibits no discharge. Left eye exhibits no discharge.   Neck: Normal range of motion.   Cardiovascular: Normal rate.   Pulmonary/Chest: Breath sounds normal.   Abdominal: Bowel sounds are normal.   Musculoskeletal: She exhibits no edema.   Skin: She is not diaphoretic.       NEUROLOGICAL EXAMINATION:     MENTAL STATUS        Does not respond to verbal stimuli.     CRANIAL NERVES     CN III, IV, VI   Pupils: pinpoint  Right pupil: Size: 2 mm.   Left pupil: Size: 2 mm.   Nystagmus: none   Conjugate gaze: present    CN VII   Right facial weakness: none  Left facial weakness: none    MOTOR EXAM        Normal tone     GAIT AND COORDINATION     Tremor   Resting tremor: absent      Significant Labs:   CBC:   Recent Labs   Lab 01/27/19  0204   WBC 6.30   HGB 8.2*   HCT 26.9*        CMP:   Recent Labs   Lab 01/26/19  1849 01/27/19  0204 01/28/19  0613    96 114*    144 150*   K 4.5 4.5 5.5*    112* 113*   CO2 20* 20* 23   BUN 49* 48* 51*   CREATININE 3.2* 2.9* 2.5*   CALCIUM 7.9* 8.0* 8.7   ALBUMIN  --  2.8* 3.0*   ANIONGAP 15 12 14   EGFRNONAA 14* 16* 19*        Significant Imaging:  Head CT  FINDINGS:  The brain is normally formed.  The ventricular system is normal in size for the patient's age.  There is no areas of hypoattenuation seen to strongly suggest an acute infarction.  Note that MRI has greater sensitivity to detect acute infarction and could be done if this is clinically warranted.  There is no evidence of sizable remote infarction.  There is no intracranial mass or hemorrhage seen.  No subdural fluid collection.  The skull appears intact.  The visualized the paranasal sinuses are clear.      Impression       No definite acute intracranial process seen.  No change from the prior study.      Electronically signed by: Thao Pearson MD  Date: 01/26/2019  Time: 10:56       Assessment and Plan:     70 y/o female consutled for AMS    1. AMS: likely the result of a metabolic encphalopathy and dementia. Hypoactive delirium may also be adding to her problem. Head CT with no abnormal findings.   -Would avoid benzodiazepines and opiates.   -Will monitor.If no signs of improvement, then EEG.       Active Diagnoses:    Diagnosis Date Noted POA    PRINCIPAL PROBLEM:  Acute respiratory acidosis [E87.2] 01/23/2019 Yes    Delirium [R41.0] 01/26/2019 Unknown    Pulmonary hypertension [I27.20] 01/24/2019 Yes    Acute combined systolic and diastolic heart failure [I50.41] 01/24/2019 Yes    NSTEMI (non-ST elevated myocardial infarction) [I21.4] 01/24/2019 Yes    Tobacco use disorder, severe, dependence [F17.200] 01/24/2019 Yes    Acute respiratory failure with hypoxia and hypercapnia [J96.01, J96.02] 01/23/2019 Yes    Acute renal failure [N17.9] 08/10/2013 Yes    Diabetes mellitus type 2 in nonobese [E11.9] 08/10/2013 Yes    Hypertension [I10] 08/10/2013 Yes    Abnormal urinalysis [R82.90] 08/10/2013 Yes    Hyperkalemia, diminished renal excretion [E87.5] 08/09/2013 Yes      Problems Resolved During this Admission:       VTE Risk Mitigation (From admission,  onward)        Ordered     IP VTE HIGH RISK PATIENT  Once      01/23/19 2129          Thank you for your consult. I will follow-up with patient. Please contact us if you have any additional questions.    Chuy Castillo MD  Neurology  [facility]

## 2019-01-28 NOTE — PLAN OF CARE
Problem: Skin Injury Risk Increased  Goal: Skin Health and Integrity    Intervention: Optimize Skin Protection   01/27/19 1600 01/28/19 0558 01/28/19 0834   Prevent Additional Skin Injury   Head of Bed (HOB) --  HOB elevated --    Pressure Reduction Devices pressure-redistributing mattress utilized --  --    Pressure Reduction Techniques --  --  weight shift assistance provided   Monitor and Manage Hypervolemia   Skin Protection --  --  incontinence pads utilized;tubing/devices free from skin contact     Intervention: Promote and Optimize Oral Intake   01/27/19 1945   Monitor and Manage Anemia   Oral Nutrition Promotion rest periods promoted

## 2019-01-28 NOTE — PLAN OF CARE
Problem: Skin Injury Risk Increased  Goal: Skin Health and Integrity  Outcome: Ongoing (interventions implemented as appropriate)  Intervention: Optimize Skin Protection   01/28/19 1718   Prevent Additional Skin Injury   Head of Bed (HOB) HOB at 20-30 degrees   Pressure Reduction Devices pressure-redistributing mattress utilized   Pressure Reduction Techniques weight shift assistance provided   Monitor and Manage Hypervolemia   Skin Protection incontinence pads utilized         Problem: Diabetes Comorbidity  Goal: Blood Glucose Level Within Desired Range  Outcome: Ongoing (interventions implemented as appropriate)  Intervention: Maintain Glycemic Control   01/28/19 1718   Monitor and Manage Ketoacidosis   Glycemic Management blood glucose monitoring         Problem: Infection  Goal: Infection Symptom Resolution  Outcome: Ongoing (interventions implemented as appropriate)  Intervention: Prevent or Manage Infection   01/28/19 1718   Prevent or Manage Infection   Fever Reduction/Comfort Measures fluid intake increased   Infection Management aseptic technique maintained

## 2019-01-28 NOTE — SUBJECTIVE & OBJECTIVE
Interval History: No new issues     Review of Systems   Constitutional: Positive for activity change.   Respiratory: Negative for chest tightness and shortness of breath.    Cardiovascular: Negative for chest pain.   Gastrointestinal: Negative for abdominal pain.   Genitourinary: Negative for difficulty urinating.     Objective:     Vital Signs (Most Recent):  Temp: 97.7 °F (36.5 °C) (01/28/19 0724)  Pulse: 89 (01/28/19 0724)  Resp: 16 (01/28/19 0724)  BP: (!) 155/67 (01/28/19 0724)  SpO2: 99 % (01/28/19 0724) Vital Signs (24h Range):  Temp:  [97.7 °F (36.5 °C)-99.2 °F (37.3 °C)] 97.7 °F (36.5 °C)  Pulse:  [] 89  Resp:  [16-28] 16  SpO2:  [97 %-100 %] 99 %  BP: (132-158)/(57-73) 155/67     Weight: 49.4 kg (108 lb 14.5 oz)  Body mass index is 19.92 kg/m².    Intake/Output Summary (Last 24 hours) at 1/28/2019 1001  Last data filed at 1/27/2019 1600  Gross per 24 hour   Intake --   Output 130 ml   Net -130 ml      Physical Exam   Constitutional: She is oriented to person, place, and time. She appears well-developed and well-nourished.   HENT:   Head: Normocephalic and atraumatic.   Pulmonary/Chest: She has no wheezes.   Abdominal: There is no tenderness.   Neurological: She is alert and oriented to person, place, and time.   Nursing note and vitals reviewed.      Significant Labs:   BMP:   Recent Labs   Lab 01/28/19  0613   *   *   K 5.5*   *   CO2 23   BUN 51*   CREATININE 2.5*   CALCIUM 8.7     CBC:   Recent Labs   Lab 01/27/19  0204   WBC 6.30   HGB 8.2*   HCT 26.9*          Significant Imaging:

## 2019-01-28 NOTE — PROGRESS NOTES
Zakiya Garcia is a 69 y.o. female patient.    Follow for REGLA, hypernatremia    Lethargic, arousable    Scheduled Meds:   albuterol-ipratropium  3 mL Nebulization Q4H WAKE    aspirin  81 mg Oral Daily    atorvastatin  40 mg Oral Daily    carvedilol  25 mg Oral BID    clopidogrel  75 mg Oral Daily    famotidine (PF)  20 mg Intravenous Daily    methylPREDNISolone sodium succinate  40 mg Intravenous Q12H       Review of patient's allergies indicates:  No Known Allergies      Vital Signs Range (Last 24H):  Temp:  [97.7 °F (36.5 °C)-99.2 °F (37.3 °C)]   Pulse:  [81-98]   Resp:  [16-24]   BP: (132-155)/(61-73)   SpO2:  [97 %-100 %]     I & O (Last 24H):    Intake/Output Summary (Last 24 hours) at 1/28/2019 1248  Last data filed at 1/27/2019 1600  Gross per 24 hour   Intake --   Output 90 ml   Net -90 ml           Physical Exam:  General appearance: well developed, well nourished, no distress  Lungs:  clear to auscultation bilaterally and normal respiratory effort  Heart: regular rate and rhythm  Abdomen: soft, non-tender non-distented; bowel sounds normal; no masses,  no organomegaly  Extremities: no cyanosis or edema, or clubbing    Laboratory:  CBC:   Recent Labs   Lab 01/27/19  0204   WBC 6.30   RBC 3.00*   HGB 8.2*   HCT 26.9*      MCV 90   MCH 27.3   MCHC 30.5*     CMP:   Recent Labs   Lab 01/26/19  0646  01/28/19  0613      < > 114*   CALCIUM 7.7*   < > 8.7   ALBUMIN 2.9*   < > 3.0*   PROT 5.8*  --   --       < > 150*   K 4.4   < > 5.5*   CO2 20*   < > 23      < > 113*   BUN 47*   < > 51*   CREATININE 3.1*   < > 2.5*   ALKPHOS 90  --   --    ALT 30  --   --    AST 34  --   --    BILITOT 0.1  --   --     < > = values in this interval not displayed.       Imp/Plan    REGLA - clinically prerenal  Hypernatremia - free water deficit  DM type 2  HTN  Hyperkalemia - mild, most likely related to renal failure  Anemia of chronic disease    Change IVF to D5W  CMP in am          Trac T  Le  1/28/2019

## 2019-01-28 NOTE — ASSESSMENT & PLAN NOTE
Not much improved with BiPAP. Maybe very slight. Degree of metabolic acidosis cannot account for this  Workup thus far revealed new severely depressed LVEF to 25% with inferoposterior WMA  Trop initially mildly elevated to 0.089 now 2. Patient is chest pain free.   Unknown how long EF has been depressed but per son her SOB has been present for days. MI might not be acute  Has had a coronary stent placed 6 years ago, per son. On ASA daily but smokes cigarettes continuously and follows a high salt diet.   Currently not a good candidate for cardiac catheterization  Diuresed. BP at goal. Adequately diuresed and more alert although still with some resp acidosis  Took a lot of goody powder. Salicylate induced? Salicylate level is not elevated  Continue on low flow NC for now. Must quit smoking. Will test for home O2 prior to discharge  Cardiology and pulm on board for co-management- signed off.

## 2019-01-28 NOTE — PROGRESS NOTES
[facility] Hospital Medicine  Progress Note    Patient Name: Zakiya Garcia  MRN: [id number]  Patient Class: IP- Inpatient   Admission Date: 1/23/2019  Length of Stay: 5 days  Attending Physician: Jay Resendiz MD  Primary Care Provider: Ines Barragan MD        Subjective:     Principal Problem:Acute respiratory acidosis    HPI:  69 year old female with hypertension, dementia, s/p dorota BKA, hyperlipidemia, smoker and dementia who presented with shortness of breath. Per EMS, patient's sats were 90% at room air then 100% after breathing treatment and a dose of solumedrol 125 mg IM. Patient's son reported gradual worsening of non productive cough as well as emesis after coughing this AM. Chest pain when coughing. Patient was last seen in the ED on 1/14 (~10 days PTA) for a UTI. Was prescribed bactrim BID x 7 days. Patient is not sure if still taking metformin or lisinopril. Son will bring list of meds.     In the ED, patient was tachycardic, with low grade fever, with tachypnea and sat 92% at room air. Lungs clear to auscultation and XRay without evidence of consolidation or edema. Labs showed moderate hyperkalemia, acute renal failure and elevated lactic acid. ABG showed pH of 7.0 with hypercapnia. Patient admitted to ICU for close monitoring given such significant metabolic derangements.     Hospital Course:  Ms Garcia presented with shortness of breath and decreased appetite. Workup in the ED significant for moderate hyperkalemia (6.4), acute renal failure, elevated lactic acid (2.3) and acute respiratory acidosis (pH of 7, pCO2 of 74.4). Patient was surprisingly very alert and conversational, but lab results very concerning. ABG without significant change despite BiPAP. Admitted to ICU for close observation. Pulmonology and nephrology consulted. Respiratory acidosis persisted despite multiple attempts on BiPAP. Degree of metabolic acidosis is not severe enough to be main cause. Furosemide  and bicarb did not help much either. Further workup revealed elevated trop of 2 (0.08 in the ED) and LVEF of 25% with global hypokinesis with inferoposterior WMA. This is new. May also have undiagnosed emphysema. Hyperkalemia resolved with shifting and by using kayexalate. Patient clinically improved although not back to baseline. Short course of steroids added to duo-nebs for wheezing. Appetite is poor. Patient was strongly advised to quit smoking (smokes continuously all day, per son), taking so much goody powder and stop carbonated drinks (drinks coke all day). Patient does not appear to be motivated to quit any. Stepped down to floor on 1/27.     Interval History: No new issues     Review of Systems   Constitutional: Positive for activity change.   Respiratory: Negative for chest tightness and shortness of breath.    Cardiovascular: Negative for chest pain.   Gastrointestinal: Negative for abdominal pain.   Genitourinary: Negative for difficulty urinating.     Objective:     Vital Signs (Most Recent):  Temp: 97.7 °F (36.5 °C) (01/28/19 0724)  Pulse: 89 (01/28/19 0724)  Resp: 16 (01/28/19 0724)  BP: (!) 155/67 (01/28/19 0724)  SpO2: 99 % (01/28/19 0724) Vital Signs (24h Range):  Temp:  [97.7 °F (36.5 °C)-99.2 °F (37.3 °C)] 97.7 °F (36.5 °C)  Pulse:  [] 89  Resp:  [16-28] 16  SpO2:  [97 %-100 %] 99 %  BP: (132-158)/(57-73) 155/67     Weight: 49.4 kg (108 lb 14.5 oz)  Body mass index is 19.92 kg/m².    Intake/Output Summary (Last 24 hours) at 1/28/2019 1001  Last data filed at 1/27/2019 1600  Gross per 24 hour   Intake --   Output 130 ml   Net -130 ml      Physical Exam   Constitutional: She is oriented to person, place, and time. She appears well-developed and well-nourished.   HENT:   Head: Normocephalic and atraumatic.   Pulmonary/Chest: She has no wheezes.   Abdominal: There is no tenderness.   Neurological: She is alert and oriented to person, place, and time.   Nursing note and vitals  reviewed.      Significant Labs:   BMP:   Recent Labs   Lab 01/28/19  0613   *   *   K 5.5*   *   CO2 23   BUN 51*   CREATININE 2.5*   CALCIUM 8.7     CBC:   Recent Labs   Lab 01/27/19  0204   WBC 6.30   HGB 8.2*   HCT 26.9*          Significant Imaging:     Assessment/Plan:      * Acute respiratory acidosis    Not much improved with BiPAP. Maybe very slight. Degree of metabolic acidosis cannot account for this  Workup thus far revealed new severely depressed LVEF to 25% with inferoposterior WMA  Trop initially mildly elevated to 0.089 now 2. Patient is chest pain free.   Unknown how long EF has been depressed but per son her SOB has been present for days. MI might not be acute  Has had a coronary stent placed 6 years ago, per son. On ASA daily but smokes cigarettes continuously and follows a high salt diet.   Currently not a good candidate for cardiac catheterization  Diuresed. BP at goal. Adequately diuresed and more alert although still with some resp acidosis  Took a lot of goody powder. Salicylate induced? Salicylate level is not elevated  Continue on low flow NC for now. Must quit smoking. Will test for home O2 prior to discharge  Cardiology and pulm on board for co-management- signed off.            Tobacco use disorder, severe, dependence    Smoking all day long  Spent > 10 minutes discussing tobacco cessation  Patient does not appear to be motivated. Not interested on nicotine patch       NSTEMI (non-ST elevated myocardial infarction)    As above       Acute combined systolic and diastolic heart failure    As above       Acute respiratory failure with hypoxia and hypercapnia    As above       Diabetes mellitus type 2 in nonobese    Hold metformin and start insulin  Adjust as needed for goal -180       Hypertension    Now better controlled  Restart carvedilol. Will add nifedipine if needed for goal SBP < 130       Acute renal failure    Unsure if true renal failure or side  effect from bactrim. Son also reports taking a lot of goody powder  Hold off on metformin   Stopped fluids for pulm edema. Holding furosemide as I believe she was over diuresed (dry oral mucosa, better sats, elevated BUN). Renal function improved some with this. Nephrology on board for co-management   BMP in AM  Strict I/Os       Abnormal urinalysis    Abnormal urinalysis  UCx negative  Stop ceftriaxone       Hyperkalemia, diminished renal excretion    2/2 to bactrim vs renal failure vs both  Resolved     Added kayexalate          VTE Risk Mitigation (From admission, onward)        Ordered     IP VTE HIGH RISK PATIENT  Once      01/23/19 1630        Treat hyperkalemia. Follow renal recs. PT/OT consideration.  Home in next 1-2 days?         Jay Mccurdy MD  Department of Hospital Medicine   [facility]

## 2019-01-29 PROBLEM — R53.81 DEBILITY: Status: ACTIVE | Noted: 2019-01-29

## 2019-01-29 LAB
ALBUMIN SERPL BCP-MCNC: 3.1 G/DL
ALBUMIN SERPL BCP-MCNC: 3.2 G/DL
ALLENS TEST: ABNORMAL
ALLENS TEST: ABNORMAL
ALP SERPL-CCNC: 73 U/L
ALT SERPL W/O P-5'-P-CCNC: 19 U/L
ANION GAP SERPL CALC-SCNC: 6 MMOL/L
ANION GAP SERPL CALC-SCNC: 7 MMOL/L
AST SERPL-CCNC: 17 U/L
BILIRUB SERPL-MCNC: 0.2 MG/DL
BUN SERPL-MCNC: 57 MG/DL
BUN SERPL-MCNC: 58 MG/DL
CALCIUM SERPL-MCNC: 8.4 MG/DL
CALCIUM SERPL-MCNC: 8.5 MG/DL
CHLORIDE SERPL-SCNC: 109 MMOL/L
CHLORIDE SERPL-SCNC: 110 MMOL/L
CO2 SERPL-SCNC: 25 MMOL/L
CO2 SERPL-SCNC: 27 MMOL/L
CREAT SERPL-MCNC: 2.6 MG/DL
CREAT SERPL-MCNC: 2.7 MG/DL
DELSYS: ABNORMAL
DELSYS: ABNORMAL
EP: 5
ERYTHROCYTE [SEDIMENTATION RATE] IN BLOOD BY WESTERGREN METHOD: 16 MM/H
EST. GFR  (AFRICAN AMERICAN): 20 ML/MIN/1.73 M^2
EST. GFR  (AFRICAN AMERICAN): 21 ML/MIN/1.73 M^2
EST. GFR  (NON AFRICAN AMERICAN): 17 ML/MIN/1.73 M^2
EST. GFR  (NON AFRICAN AMERICAN): 18 ML/MIN/1.73 M^2
FIO2: 28
FLOW: 4
GLUCOSE SERPL-MCNC: 277 MG/DL
GLUCOSE SERPL-MCNC: 278 MG/DL
HCO3 UR-SCNC: 25 MMOL/L (ref 24–28)
HCO3 UR-SCNC: 26.2 MMOL/L (ref 24–28)
IP: 18
MODE: ABNORMAL
MODE: ABNORMAL
PCO2 BLDA: 59.1 MMHG (ref 35–45)
PCO2 BLDA: 72.3 MMHG (ref 35–45)
PH SMN: 7.17 [PH] (ref 7.35–7.45)
PH SMN: 7.23 [PH] (ref 7.35–7.45)
PHOSPHATE SERPL-MCNC: 4 MG/DL
PO2 BLDA: 101 MMHG (ref 80–100)
PO2 BLDA: 185 MMHG (ref 80–100)
POC BE: -3 MMOL/L
POC BE: -3 MMOL/L
POC SATURATED O2: 96 % (ref 95–100)
POC SATURATED O2: 99 % (ref 95–100)
POC TCO2: 27 MMOL/L (ref 23–27)
POC TCO2: 28 MMOL/L (ref 23–27)
POCT GLUCOSE: 148 MG/DL (ref 70–110)
POCT GLUCOSE: 187 MG/DL (ref 70–110)
POCT GLUCOSE: 241 MG/DL (ref 70–110)
POCT GLUCOSE: 260 MG/DL (ref 70–110)
POCT GLUCOSE: 324 MG/DL (ref 70–110)
POTASSIUM SERPL-SCNC: 5.2 MMOL/L
POTASSIUM SERPL-SCNC: 5.4 MMOL/L
PROT SERPL-MCNC: 6 G/DL
SAMPLE: ABNORMAL
SAMPLE: ABNORMAL
SITE: ABNORMAL
SITE: ABNORMAL
SODIUM SERPL-SCNC: 141 MMOL/L
SODIUM SERPL-SCNC: 143 MMOL/L

## 2019-01-29 PROCEDURE — S0028 INJECTION, FAMOTIDINE, 20 MG: HCPCS | Performed by: INTERNAL MEDICINE

## 2019-01-29 PROCEDURE — 80069 RENAL FUNCTION PANEL: CPT

## 2019-01-29 PROCEDURE — 99232 PR SUBSEQUENT HOSPITAL CARE,LEVL II: ICD-10-PCS | Mod: ,,, | Performed by: PSYCHIATRY & NEUROLOGY

## 2019-01-29 PROCEDURE — 63600175 PHARM REV CODE 636 W HCPCS: Performed by: INTERNAL MEDICINE

## 2019-01-29 PROCEDURE — 27000221 HC OXYGEN, UP TO 24 HOURS

## 2019-01-29 PROCEDURE — 99900035 HC TECH TIME PER 15 MIN (STAT)

## 2019-01-29 PROCEDURE — 25000003 PHARM REV CODE 250: Performed by: INTERNAL MEDICINE

## 2019-01-29 PROCEDURE — 36415 COLL VENOUS BLD VENIPUNCTURE: CPT

## 2019-01-29 PROCEDURE — 94660 CPAP INITIATION&MGMT: CPT

## 2019-01-29 PROCEDURE — 80053 COMPREHEN METABOLIC PANEL: CPT

## 2019-01-29 PROCEDURE — 94761 N-INVAS EAR/PLS OXIMETRY MLT: CPT

## 2019-01-29 PROCEDURE — 27000190 HC CPAP FULL FACE MASK W/VALVE

## 2019-01-29 PROCEDURE — 94640 AIRWAY INHALATION TREATMENT: CPT

## 2019-01-29 PROCEDURE — 36600 WITHDRAWAL OF ARTERIAL BLOOD: CPT

## 2019-01-29 PROCEDURE — 25000242 PHARM REV CODE 250 ALT 637 W/ HCPCS: Performed by: INTERNAL MEDICINE

## 2019-01-29 PROCEDURE — 21400001 HC TELEMETRY ROOM

## 2019-01-29 PROCEDURE — 82803 BLOOD GASES ANY COMBINATION: CPT

## 2019-01-29 PROCEDURE — 99232 SBSQ HOSP IP/OBS MODERATE 35: CPT | Mod: ,,, | Performed by: PSYCHIATRY & NEUROLOGY

## 2019-01-29 RX ORDER — PREDNISONE 20 MG/1
60 TABLET ORAL DAILY
Status: DISCONTINUED | OUTPATIENT
Start: 2019-01-29 | End: 2019-02-05 | Stop reason: HOSPADM

## 2019-01-29 RX ORDER — HYDRALAZINE HYDROCHLORIDE 20 MG/ML
10 INJECTION INTRAMUSCULAR; INTRAVENOUS EVERY 6 HOURS PRN
Status: DISCONTINUED | OUTPATIENT
Start: 2019-01-29 | End: 2019-02-05 | Stop reason: HOSPADM

## 2019-01-29 RX ORDER — HEPARIN SODIUM 5000 [USP'U]/ML
5000 INJECTION, SOLUTION INTRAVENOUS; SUBCUTANEOUS EVERY 12 HOURS
Status: DISCONTINUED | OUTPATIENT
Start: 2019-01-29 | End: 2019-02-05 | Stop reason: HOSPADM

## 2019-01-29 RX ADMIN — INSULIN ASPART 4 UNITS: 100 INJECTION, SOLUTION INTRAVENOUS; SUBCUTANEOUS at 08:01

## 2019-01-29 RX ADMIN — IPRATROPIUM BROMIDE AND ALBUTEROL SULFATE 3 ML: .5; 3 SOLUTION RESPIRATORY (INHALATION) at 11:01

## 2019-01-29 RX ADMIN — IPRATROPIUM BROMIDE AND ALBUTEROL SULFATE 3 ML: .5; 3 SOLUTION RESPIRATORY (INHALATION) at 03:01

## 2019-01-29 RX ADMIN — FAMOTIDINE 20 MG: 10 INJECTION INTRAVENOUS at 08:01

## 2019-01-29 RX ADMIN — HEPARIN SODIUM 5000 UNITS: 5000 INJECTION, SOLUTION INTRAVENOUS; SUBCUTANEOUS at 08:01

## 2019-01-29 RX ADMIN — DEXTROSE: 5 SOLUTION INTRAVENOUS at 02:01

## 2019-01-29 RX ADMIN — HYDRALAZINE HYDROCHLORIDE 10 MG: 20 INJECTION INTRAMUSCULAR; INTRAVENOUS at 09:01

## 2019-01-29 RX ADMIN — INSULIN ASPART 1 UNITS: 100 INJECTION, SOLUTION INTRAVENOUS; SUBCUTANEOUS at 12:01

## 2019-01-29 RX ADMIN — LORAZEPAM 2 MG: 2 INJECTION, SOLUTION INTRAMUSCULAR; INTRAVENOUS at 06:01

## 2019-01-29 RX ADMIN — IPRATROPIUM BROMIDE AND ALBUTEROL SULFATE 3 ML: .5; 3 SOLUTION RESPIRATORY (INHALATION) at 07:01

## 2019-01-29 NOTE — NURSING
Notified Dr. Mccurdy of CO2 72. Order given to place pt back on bipap. RT notified and pt now back on bipap. Will continue to monitor pt.

## 2019-01-29 NOTE — PROGRESS NOTES
Zakiya Garcia is a 69 y.o. female patient.    Follow for REGLA    On BiPAP, lethargic    Scheduled Meds:   albuterol-ipratropium  3 mL Nebulization Q4H WAKE    aspirin  81 mg Oral Daily    atorvastatin  40 mg Oral Daily    carvedilol  25 mg Oral BID    clopidogrel  75 mg Oral Daily    famotidine (PF)  20 mg Intravenous Daily    heparin (porcine)  5,000 Units Subcutaneous Q12H    predniSONE  60 mg Oral Daily       Review of patient's allergies indicates:  No Known Allergies      Vital Signs Range (Last 24H):  Temp:  [97.4 °F (36.3 °C)-98.2 °F (36.8 °C)]   Pulse:  [80-94]   Resp:  [16-20]   BP: (137-146)/(65-80)   SpO2:  [95 %-100 %]     I & O (Last 24H):    Intake/Output Summary (Last 24 hours) at 1/29/2019 1123  Last data filed at 1/29/2019 0653  Gross per 24 hour   Intake 1113.75 ml   Output --   Net 1113.75 ml           Physical Exam:  General appearance: well developed, cachectic  Lungs:  diminished breath sounds bilaterally  Heart: regular rate and rhythm  Abdomen: soft, non-tender non-distented; bowel sounds normal; no masses,  no organomegaly  Extremities: no cyanosis or edema, or clubbing    Laboratory:  CBC:   Recent Labs   Lab 01/27/19  0204   WBC 6.30   RBC 3.00*   HGB 8.2*   HCT 26.9*      MCV 90   MCH 27.3   MCHC 30.5*     CMP:   Recent Labs   Lab 01/29/19  0602   *  277*   CALCIUM 8.5*  8.4*   ALBUMIN 3.2*  3.1*   PROT 6.0     143   K 5.2*  5.4*   CO2 25  27     110   BUN 57*  58*   CREATININE 2.7*  2.6*   ALKPHOS 73   ALT 19   AST 17   BILITOT 0.2       Imp/Plan    REGLA - creatinine unchanged  DM type 2  Hypernatremia - resolved  HTN  Hyperkalemia - mild  Anemia of chronic disease    D/c IVF  CMP in am    Trac T Le  1/29/2019

## 2019-01-29 NOTE — NURSING
Notified Dr. Mccurdy that pt will not wake up enough to take meds or eat breakfast. Informed MD that sternal rub was done and pt grimaced and turned the other way. Order placed for ABG. Will continue to monitor pt.

## 2019-01-29 NOTE — PT/OT/SLP PROGRESS
Occupational Therapy      Patient Name:  Zakiya Garcia   MRN:  [id number]    Patient not seen today secondary to Other (Comment)(hold per nurse, Damaris 2* patient is on BiPAP 2* abnormal ABG's). Will follow-up as appropriate.    Crystal Artis, OT  1/29/2019

## 2019-01-29 NOTE — PROGRESS NOTES
Placed patient back on Bipap 18/5, 28% and rate of 16 per Dr. Mccurdy's request.  Patient is a mouth breather and sats are 95%.

## 2019-01-29 NOTE — PROGRESS NOTES
[facility] Hospital Medicine  Progress Note    Patient Name: Zakiya Garcia  MRN: [id number]  Patient Class: IP- Inpatient   Admission Date: 1/23/2019  Length of Stay: 6 days  Attending Physician: Jay Resendiz MD  Primary Care Provider: Ines Barragan MD        Subjective:     Principal Problem:Acute respiratory acidosis    HPI:  69 year old female with hypertension, dementia, s/p dorota BKA, hyperlipidemia, smoker and dementia who presented with shortness of breath. Per EMS, patient's sats were 90% at room air then 100% after breathing treatment and a dose of solumedrol 125 mg IM. Patient's son reported gradual worsening of non productive cough as well as emesis after coughing this AM. Chest pain when coughing. Patient was last seen in the ED on 1/14 (~10 days PTA) for a UTI. Was prescribed bactrim BID x 7 days. Patient is not sure if still taking metformin or lisinopril. Son will bring list of meds.     In the ED, patient was tachycardic, with low grade fever, with tachypnea and sat 92% at room air. Lungs clear to auscultation and XRay without evidence of consolidation or edema. Labs showed moderate hyperkalemia, acute renal failure and elevated lactic acid. ABG showed pH of 7.0 with hypercapnia. Patient admitted to ICU for close monitoring given such significant metabolic derangements.     Hospital Course:  Ms Garcia presented with shortness of breath and decreased appetite. Workup in the ED significant for moderate hyperkalemia (6.4), acute renal failure, elevated lactic acid (2.3) and acute respiratory acidosis (pH of 7, pCO2 of 74.4). Patient was surprisingly very alert and conversational, but lab results very concerning. ABG without significant change despite BiPAP. Admitted to ICU for close observation. Pulmonology and nephrology consulted. Respiratory acidosis persisted despite multiple attempts on BiPAP. Degree of metabolic acidosis is not severe enough to be main cause. Furosemide  and bicarb did not help much either. Further workup revealed elevated trop of 2 (0.08 in the ED) and LVEF of 25% with global hypokinesis with inferoposterior WMA. This is new. May also have undiagnosed emphysema. Hyperkalemia resolved with shifting and by using kayexalate. Patient clinically improved although not back to baseline. Short course of steroids added to duo-nebs for wheezing. Appetite is poor. Patient was strongly advised to quit smoking (smokes continuously all day, per son), taking so much goody powder and stop carbonated drinks (drinks coke all day). Patient does not appear to be motivated to quit any. Stepped down to floor on 1/27.  PT/OT consulted. Neurology consulted on 1/28 for AMS- thought to be metabolic encephalopathy     Interval History: No new issues     Review of Systems   Constitutional: Positive for activity change.   Respiratory: Negative for chest tightness and shortness of breath.    Cardiovascular: Negative for chest pain.   Gastrointestinal: Negative for abdominal pain.   Genitourinary: Negative for difficulty urinating.     Objective:     Vital Signs (Most Recent):  Temp: 97.6 °F (36.4 °C) (01/29/19 0742)  Pulse: 94 (01/29/19 0742)  Resp: 18 (01/29/19 0742)  BP: (!) 146/79 (01/29/19 0742)  SpO2: 100 % (01/29/19 0742) Vital Signs (24h Range):  Temp:  [97.4 °F (36.3 °C)-98.2 °F (36.8 °C)] 97.6 °F (36.4 °C)  Pulse:  [80-94] 94  Resp:  [16-20] 18  SpO2:  [96 %-100 %] 100 %  BP: (137-146)/(65-80) 146/79     Weight: 110.4 kg (243 lb 6.2 oz)  Body mass index is 44.52 kg/m².    Intake/Output Summary (Last 24 hours) at 1/29/2019 0813  Last data filed at 1/29/2019 0653  Gross per 24 hour   Intake 1113.75 ml   Output --   Net 1113.75 ml      Physical Exam   Constitutional: She is oriented to person, place, and time. She appears well-developed and well-nourished.   HENT:   Head: Normocephalic and atraumatic.   Pulmonary/Chest: She has no wheezes.   Abdominal: There is no tenderness.    Neurological: She is alert and oriented to person, place, and time.   Nursing note and vitals reviewed.      Significant Labs:   BMP:   Recent Labs   Lab 01/28/19  0613   *   *   K 5.5*   *   CO2 23   BUN 51*   CREATININE 2.5*   CALCIUM 8.7     CBC: No results for input(s): WBC, HGB, HCT, PLT in the last 48 hours.    Significant Imaging:     Assessment/Plan:      * Acute respiratory acidosis    Not much improved with BiPAP. Maybe very slight. Degree of metabolic acidosis cannot account for this  Workup thus far revealed new severely depressed LVEF to 25% with inferoposterior WMA  Trop initially mildly elevated to 0.089 now 2. Patient is chest pain free.   Unknown how long EF has been depressed but per son her SOB has been present for days. MI might not be acute  Has had a coronary stent placed 6 years ago, per son. On ASA daily but smokes cigarettes continuously and follows a high salt diet.   Currently not a good candidate for cardiac catheterization  Diuresed. BP at goal. Adequately diuresed and more alert although still with some resp acidosis  Took a lot of goody powder. Salicylate induced? Salicylate level is not elevated  Continue on low flow NC for now. Must quit smoking. Will test for home O2 prior to discharge  Cardiology and pulm on board for co-management- signed off.            Debility    PT/OT eval.      Delirium    Neurology consulted. Think may be metabolic encephalopathy.  +/- EEG. CT negative.        Tobacco use disorder, severe, dependence    Smoking all day long  Spent > 10 minutes discussing tobacco cessation  Patient does not appear to be motivated. Not interested on nicotine patch       NSTEMI (non-ST elevated myocardial infarction)    As above       Acute combined systolic and diastolic heart failure    As above       Acute respiratory failure with hypoxia and hypercapnia    As above       Diabetes mellitus type 2 in nonobese    Hold metformin and start insulin  Adjust as  needed for goal -180       Hypertension    Now better controlled  Restart carvedilol. Will add nifedipine if needed for goal SBP < 130       Acute renal failure    Unsure if true renal failure or side effect from bactrim. Son also reports taking a lot of goody powder  Hold off on metformin   Stopped fluids for pulm edema. Holding furosemide as I believe she was over diuresed (dry oral mucosa, better sats, elevated BUN). Renal function improved some with this. Nephrology on board for co-management   BMP in AM  Strict I/Os       Abnormal urinalysis    Abnormal urinalysis  UCx negative  Stop ceftriaxone       Hyperkalemia, diminished renal excretion    2/2 to bactrim vs renal failure vs both  Resolved     Added kayexalate          VTE Risk Mitigation (From admission, onward)        Ordered     IP VTE HIGH RISK PATIENT  Once      01/23/19 1630        PT/OT eval. Neuro.       Jay Mccurdy MD  Department of Hospital Medicine   [facility] [place]

## 2019-01-29 NOTE — PLAN OF CARE
Problem: Fall Injury Risk  Goal: Absence of Fall and Fall-Related Injury  Outcome: Ongoing (interventions implemented as appropriate)  Intervention: Identify and Manage Contributors to Fall Injury Risk   01/29/19 0620   Manage Acute Allergic Reaction   Medication Review/Management medications reviewed   Identify and Manage Contributors to Fall Injury Risk   Self-Care Promotion independence encouraged;BADL personal objects within reach;BADL personal routines maintained         Problem: Adult Inpatient Plan of Care  Goal: Plan of Care Review  Outcome: Ongoing (interventions implemented as appropriate)   01/29/19 0620   Plan of Care Review   Plan of Care Reviewed With patient       Problem: Infection  Goal: Infection Symptom Resolution    Intervention: Prevent or Manage Infection   01/29/19 0620   Prevent or Manage Infection   Infection Management aseptic technique maintained

## 2019-01-29 NOTE — PLAN OF CARE
Problem: Fall Injury Risk  Goal: Absence of Fall and Fall-Related Injury    Intervention: Promote Injury-Free Environment   01/29/19 1425   Optimize Balance and Safe Activity   Safety Promotion/Fall Prevention bed alarm set   Optimize [place] and Functional Mobility   Environmental Safety Modification clutter free environment maintained;room near unit station         Problem: Diabetes Comorbidity  Goal: Blood Glucose Level Within Desired Range    Intervention: Maintain Glycemic Control   01/29/19 1425   Monitor and Manage Ketoacidosis   Glycemic Management blood glucose monitoring         Problem: Infection  Goal: Infection Symptom Resolution    Intervention: Prevent or Manage Infection   01/29/19 1425   Prevent or Manage Infection   Fever Reduction/Comfort Measures lightweight clothing;lightweight bedding

## 2019-01-29 NOTE — PT/OT/SLP PROGRESS
Speech Language Pathology      Zakiya Garcia  MRN: [id number]    Patient not seen today secondary unable to remove bipap, decreased OMARI    Cary Garcia, CCC-SLP

## 2019-01-29 NOTE — PT/OT/SLP PROGRESS
Physical Therapy      Patient Name:  Zakiya Garcia   MRN:  [id number]    Patient not seen today secondary to nursing hold due to patient on BiPap due to abnormal ABG's. Will follow-up again as able.    Radha Linares, PT

## 2019-01-29 NOTE — SUBJECTIVE & OBJECTIVE
Interval History: No new issues     Review of Systems   Constitutional: Positive for activity change.   Respiratory: Negative for chest tightness and shortness of breath.    Cardiovascular: Negative for chest pain.   Gastrointestinal: Negative for abdominal pain.   Genitourinary: Negative for difficulty urinating.     Objective:     Vital Signs (Most Recent):  Temp: 97.6 °F (36.4 °C) (01/29/19 0742)  Pulse: 94 (01/29/19 0742)  Resp: 18 (01/29/19 0742)  BP: (!) 146/79 (01/29/19 0742)  SpO2: 100 % (01/29/19 0742) Vital Signs (24h Range):  Temp:  [97.4 °F (36.3 °C)-98.2 °F (36.8 °C)] 97.6 °F (36.4 °C)  Pulse:  [80-94] 94  Resp:  [16-20] 18  SpO2:  [96 %-100 %] 100 %  BP: (137-146)/(65-80) 146/79     Weight: 110.4 kg (243 lb 6.2 oz)  Body mass index is 44.52 kg/m².    Intake/Output Summary (Last 24 hours) at 1/29/2019 0813  Last data filed at 1/29/2019 0653  Gross per 24 hour   Intake 1113.75 ml   Output --   Net 1113.75 ml      Physical Exam   Constitutional: She is oriented to person, place, and time. She appears well-developed and well-nourished.   HENT:   Head: Normocephalic and atraumatic.   Pulmonary/Chest: She has no wheezes.   Abdominal: There is no tenderness.   Neurological: She is alert and oriented to person, place, and time.   Nursing note and vitals reviewed.      Significant Labs:   BMP:   Recent Labs   Lab 01/28/19  0613   *   *   K 5.5*   *   CO2 23   BUN 51*   CREATININE 2.5*   CALCIUM 8.7     CBC: No results for input(s): WBC, HGB, HCT, PLT in the last 48 hours.    Significant Imaging:

## 2019-01-29 NOTE — PROGRESS NOTES
[facility] [place]  Neurology  Progress Note    Patient Name: Zakiya Garcia  MRN: [id number]  Admission Date: 1/23/2019  Hospital Length of Stay: 6 days  Code Status: Full Code   Attending Provider: Jay Resendiz MD  Primary Care Physician: Ines Barragan MD   Principal Problem:Acute respiratory acidosis    Subjective:     Interval History: 68 y/o female with medical Hx as listed below comes to ED for shortness of breath. Workup found to have acute resp acidosis as well as heart failure (EF of 25%). On admission of pt was alert and talking but she has becoming increasingly lethargic over several days. Today pt was very restless and was given IV lorazepam. On convulsions reported. No unilateral weakness.    -1/29/19: Pt lethargic. On BiPAP after being found markedly hypercapnic    Current Neurological Medications:     Current Facility-Administered Medications   Medication Dose Route Frequency Provider Last Rate Last Dose    acetaminophen tablet 650 mg  650 mg Oral Q4H PRN Helen Nettles MD        albuterol-ipratropium 2.5 mg-0.5 mg/3 mL nebulizer solution 3 mL  3 mL Nebulization Q4H WAKE Shyann Valdes MD   3 mL at 01/29/19 1521    aspirin EC tablet 81 mg  81 mg Oral Daily Shyann Valdes MD   81 mg at 01/27/19 1147    atorvastatin tablet 40 mg  40 mg Oral Daily Shyann Valdes MD        carvedilol tablet 25 mg  25 mg Oral BID Shyann Valdes MD   25 mg at 01/27/19 1147    clopidogrel tablet 75 mg  75 mg Oral Daily Shyann Valdes MD   75 mg at 01/27/19 1147    dextrose 50% injection 12.5 g  12.5 g Intravenous PRN Shyann Valdes MD   12.5 g at 01/25/19 1203    famotidine (PF) injection 20 mg  20 mg Intravenous Daily Shyann Valdes MD   20 mg at 01/29/19 0858    glucagon (human recombinant) injection 1 mg  1 mg Intramuscular PRN Shyann Valdes MD        heparin (porcine) injection 5,000 Units  5,000 Units Subcutaneous Q12H Jay Resendiz MD   5,000 Units at  01/29/19 0858    insulin aspart U-100 pen 0-5 Units  0-5 Units Subcutaneous Q6H PRN Shyann Valdes MD   4 Units at 01/29/19 0858    lorazepam injection 2 mg  2 mg Intravenous Q4H PRN Jay Resendiz MD   2 mg at 01/28/19 1259    ondansetron injection 4 mg  4 mg Intravenous Q8H PRN Helen Nettles MD   4 mg at 01/25/19 1647    predniSONE tablet 60 mg  60 mg Oral Daily Jay Resendiz MD        sodium chloride 0.9% flush 3 mL  3 mL Intravenous PRN Helen Nettles MD           Review of Systems   Unable to perform ROS: Mental status change     Objective:     Vital Signs (Most Recent):  Temp: 97.6 °F (36.4 °C) (01/29/19 0742)  Pulse: 88 (01/29/19 1538)  Resp: 18 (01/29/19 1538)  BP: (!) 143/81 (01/29/19 1538)  SpO2: 100 % (01/29/19 1538) Vital Signs (24h Range):  Temp:  [97.4 °F (36.3 °C)-98.2 °F (36.8 °C)] 97.6 °F (36.4 °C)  Pulse:  [80-94] 88  Resp:  [16-20] 18  SpO2:  [95 %-100 %] 100 %  BP: (122-146)/(65-81) 143/81     Weight: 110.4 kg (243 lb 6.2 oz)  Body mass index is 44.52 kg/m².    Physical Exam  Constitutional: No distress.   HENT:   Head: Normocephalic.   Eyes: Right eye exhibits no discharge. Left eye exhibits no discharge.   Neck: Normal range of motion.   Cardiovascular: Normal rate.   Pulmonary/Chest: Breath sounds normal.   Abdominal: Bowel sounds are normal.   Musculoskeletal: She exhibits no edema.   Skin: She is not diaphoretic.         NEUROLOGICAL EXAMINATION:      MENTAL STATUS        Does not respond to verbal stimuli.      CRANIAL NERVES      CN III, IV, VI   Pupils: pinpoint  Right pupil: Size: 2 mm.   Left pupil: Size: 2 mm.   Nystagmus: none   Conjugate gaze: present     CN VII   Right facial weakness: none  Left facial weakness: none     MOTOR EXAM        Normal tone      GAIT AND COORDINATION      Tremor   Resting tremor: absent           Significant Labs:   CBC: No results for input(s): WBC, HGB, HCT, PLT in the last 48 hours.  CMP:   Recent Labs   Lab 01/28/19  0620  01/29/19  0602   * 278*  277*   * 141  143   K 5.5* 5.2*  5.4*   * 109  110   CO2 23 25  27   BUN 51* 57*  58*   CREATININE 2.5* 2.7*  2.6*   CALCIUM 8.7 8.5*  8.4*   PROT  --  6.0   ALBUMIN 3.0* 3.2*  3.1*   BILITOT  --  0.2   ALKPHOS  --  73   AST  --  17   ALT  --  19   ANIONGAP 14 7*  6*   EGFRNONAA 19* 17*  18*             Assessment and Plan:     68 y/o female consutled for AMS     1. AMS: likely the result of a metabolic encphalopathy (hypercapnic) and dementia. Hypoactive delirium may also be adding to her problem. Head CT with no abnormal findings.           -Would avoid benzodiazepines and opiates.           -Will monitor. Reassess after BiPAP.                   Active Diagnoses:    Diagnosis Date Noted POA    PRINCIPAL PROBLEM:  Acute respiratory acidosis [E87.2] 01/23/2019 Yes    Debility [R53.81] 01/29/2019 Yes    Delirium [R41.0] 01/26/2019 Yes    Pulmonary hypertension [I27.20] 01/24/2019 Yes    Acute combined systolic and diastolic heart failure [I50.41] 01/24/2019 Yes    NSTEMI (non-ST elevated myocardial infarction) [I21.4] 01/24/2019 Yes    Tobacco use disorder, severe, dependence [F17.200] 01/24/2019 Yes    Acute respiratory failure with hypoxia and hypercapnia [J96.01, J96.02] 01/23/2019 Yes    Acute renal failure [N17.9] 08/10/2013 Yes    Diabetes mellitus type 2 in nonobese [E11.9] 08/10/2013 Yes    Hypertension [I10] 08/10/2013 Yes    Abnormal urinalysis [R82.90] 08/10/2013 Yes    Hyperkalemia, diminished renal excretion [E87.5] 08/09/2013 Yes      Problems Resolved During this Admission:       VTE Risk Mitigation (From admission, onward)        Ordered     heparin (porcine) injection 5,000 Units  Every 12 hours      01/29/19 0816     IP VTE HIGH RISK PATIENT  Once      01/23/19 1630          Chuy Castillo MD  Neurology  [facility]

## 2019-01-30 PROBLEM — E43 SEVERE MALNUTRITION: Status: ACTIVE | Noted: 2019-01-30

## 2019-01-30 LAB
ALBUMIN SERPL BCP-MCNC: 3 G/DL
ALBUMIN SERPL BCP-MCNC: 3 G/DL
ALP SERPL-CCNC: 66 U/L
ALT SERPL W/O P-5'-P-CCNC: 20 U/L
ANION GAP SERPL CALC-SCNC: 5 MMOL/L
ANION GAP SERPL CALC-SCNC: 6 MMOL/L
AST SERPL-CCNC: 19 U/L
BILIRUB SERPL-MCNC: 0.2 MG/DL
BUN SERPL-MCNC: 56 MG/DL
BUN SERPL-MCNC: 56 MG/DL
CALCIUM SERPL-MCNC: 8.5 MG/DL
CALCIUM SERPL-MCNC: 8.6 MG/DL
CHLORIDE SERPL-SCNC: 114 MMOL/L
CHLORIDE SERPL-SCNC: 114 MMOL/L
CO2 SERPL-SCNC: 26 MMOL/L
CO2 SERPL-SCNC: 27 MMOL/L
CREAT SERPL-MCNC: 2.3 MG/DL
CREAT SERPL-MCNC: 2.3 MG/DL
EST. GFR  (AFRICAN AMERICAN): 24 ML/MIN/1.73 M^2
EST. GFR  (AFRICAN AMERICAN): 24 ML/MIN/1.73 M^2
EST. GFR  (NON AFRICAN AMERICAN): 21 ML/MIN/1.73 M^2
EST. GFR  (NON AFRICAN AMERICAN): 21 ML/MIN/1.73 M^2
GLUCOSE SERPL-MCNC: 96 MG/DL
GLUCOSE SERPL-MCNC: 97 MG/DL
PHOSPHATE SERPL-MCNC: 2 MG/DL
POCT GLUCOSE: 120 MG/DL (ref 70–110)
POCT GLUCOSE: 147 MG/DL (ref 70–110)
POCT GLUCOSE: 85 MG/DL (ref 70–110)
POCT GLUCOSE: 95 MG/DL (ref 70–110)
POTASSIUM SERPL-SCNC: 4.4 MMOL/L
POTASSIUM SERPL-SCNC: 4.5 MMOL/L
PROT SERPL-MCNC: 5.7 G/DL
SODIUM SERPL-SCNC: 145 MMOL/L
SODIUM SERPL-SCNC: 147 MMOL/L

## 2019-01-30 PROCEDURE — 80069 RENAL FUNCTION PANEL: CPT

## 2019-01-30 PROCEDURE — 99900035 HC TECH TIME PER 15 MIN (STAT)

## 2019-01-30 PROCEDURE — 63600175 PHARM REV CODE 636 W HCPCS: Performed by: INTERNAL MEDICINE

## 2019-01-30 PROCEDURE — S0028 INJECTION, FAMOTIDINE, 20 MG: HCPCS | Performed by: INTERNAL MEDICINE

## 2019-01-30 PROCEDURE — 94640 AIRWAY INHALATION TREATMENT: CPT

## 2019-01-30 PROCEDURE — 80053 COMPREHEN METABOLIC PANEL: CPT

## 2019-01-30 PROCEDURE — 25000242 PHARM REV CODE 250 ALT 637 W/ HCPCS: Performed by: INTERNAL MEDICINE

## 2019-01-30 PROCEDURE — 25000003 PHARM REV CODE 250: Performed by: INTERNAL MEDICINE

## 2019-01-30 PROCEDURE — 99232 SBSQ HOSP IP/OBS MODERATE 35: CPT | Mod: ,,, | Performed by: PSYCHIATRY & NEUROLOGY

## 2019-01-30 PROCEDURE — 99232 PR SUBSEQUENT HOSPITAL CARE,LEVL II: ICD-10-PCS | Mod: ,,, | Performed by: PSYCHIATRY & NEUROLOGY

## 2019-01-30 PROCEDURE — 99222 1ST HOSP IP/OBS MODERATE 55: CPT | Mod: ,,, | Performed by: NURSE PRACTITIONER

## 2019-01-30 PROCEDURE — 99222 PR INITIAL HOSPITAL CARE,LEVL II: ICD-10-PCS | Mod: ,,, | Performed by: NURSE PRACTITIONER

## 2019-01-30 PROCEDURE — 36415 COLL VENOUS BLD VENIPUNCTURE: CPT

## 2019-01-30 PROCEDURE — 21400001 HC TELEMETRY ROOM

## 2019-01-30 PROCEDURE — 94761 N-INVAS EAR/PLS OXIMETRY MLT: CPT

## 2019-01-30 PROCEDURE — 94660 CPAP INITIATION&MGMT: CPT

## 2019-01-30 RX ORDER — LORAZEPAM 2 MG/ML
2 INJECTION INTRAMUSCULAR EVERY 6 HOURS PRN
Status: DISCONTINUED | OUTPATIENT
Start: 2019-01-30 | End: 2019-02-01

## 2019-01-30 RX ADMIN — HEPARIN SODIUM 5000 UNITS: 5000 INJECTION, SOLUTION INTRAVENOUS; SUBCUTANEOUS at 08:01

## 2019-01-30 RX ADMIN — LORAZEPAM 2 MG: 2 INJECTION, SOLUTION INTRAMUSCULAR; INTRAVENOUS at 02:01

## 2019-01-30 RX ADMIN — POTASSIUM PHOSPHATE, MONOBASIC AND POTASSIUM PHOSPHATE, DIBASIC 20 MMOL: 224; 236 INJECTION, SOLUTION, CONCENTRATE INTRAVENOUS at 11:01

## 2019-01-30 RX ADMIN — IPRATROPIUM BROMIDE AND ALBUTEROL SULFATE 3 ML: .5; 3 SOLUTION RESPIRATORY (INHALATION) at 08:01

## 2019-01-30 RX ADMIN — HYDRALAZINE HYDROCHLORIDE 10 MG: 20 INJECTION INTRAMUSCULAR; INTRAVENOUS at 11:01

## 2019-01-30 RX ADMIN — IPRATROPIUM BROMIDE AND ALBUTEROL SULFATE 3 ML: .5; 3 SOLUTION RESPIRATORY (INHALATION) at 11:01

## 2019-01-30 RX ADMIN — FAMOTIDINE 20 MG: 10 INJECTION INTRAVENOUS at 08:01

## 2019-01-30 RX ADMIN — IPRATROPIUM BROMIDE AND ALBUTEROL SULFATE 3 ML: .5; 3 SOLUTION RESPIRATORY (INHALATION) at 03:01

## 2019-01-30 RX ADMIN — IPRATROPIUM BROMIDE AND ALBUTEROL SULFATE 3 ML: .5; 3 SOLUTION RESPIRATORY (INHALATION) at 07:01

## 2019-01-30 NOTE — PT/OT/SLP PROGRESS
Speech Language Pathology      Zakiya Garcia  MRN: [id number]    Patient not seen today secondary to Other (Comment)(Pt on BIPAP, sleeping). Will follow-up 1/31/19.    Virginia Cole, CCC-SLP

## 2019-01-30 NOTE — PROGRESS NOTES
[facility] Hospital Medicine  Progress Note    Patient Name: Zakiya Garcia  MRN: [id number]  Patient Class: IP- Inpatient   Admission Date: 1/23/2019  Length of Stay: 7 days  Attending Physician: Jay Resendiz MD  Primary Care Provider: Ines Barragan MD        Subjective:     Principal Problem:Acute respiratory acidosis    HPI:  69 year old female with hypertension, dementia, s/p dorota BKA, hyperlipidemia, smoker and dementia who presented with shortness of breath. Per EMS, patient's sats were 90% at room air then 100% after breathing treatment and a dose of solumedrol 125 mg IM. Patient's son reported gradual worsening of non productive cough as well as emesis after coughing this AM. Chest pain when coughing. Patient was last seen in the ED on 1/14 (~10 days PTA) for a UTI. Was prescribed bactrim BID x 7 days. Patient is not sure if still taking metformin or lisinopril. Son will bring list of meds.     In the ED, patient was tachycardic, with low grade fever, with tachypnea and sat 92% at room air. Lungs clear to auscultation and XRay without evidence of consolidation or edema. Labs showed moderate hyperkalemia, acute renal failure and elevated lactic acid. ABG showed pH of 7.0 with hypercapnia. Patient admitted to ICU for close monitoring given such significant metabolic derangements.     Hospital Course:  Ms Garcia presented with shortness of breath and decreased appetite. Workup in the ED significant for moderate hyperkalemia (6.4), acute renal failure, elevated lactic acid (2.3) and acute respiratory acidosis (pH of 7, pCO2 of 74.4). Patient was surprisingly very alert and conversational, but lab results very concerning. ABG without significant change despite BiPAP. Admitted to ICU for close observation. Pulmonology and nephrology consulted. Respiratory acidosis persisted despite multiple attempts on BiPAP. Degree of metabolic acidosis is not severe enough to be main cause. Furosemide  and bicarb did not help much either. Further workup revealed elevated trop of 2 (0.08 in the ED) and LVEF of 25% with global hypokinesis with inferoposterior WMA. This is new. May also have undiagnosed emphysema. Hyperkalemia resolved with shifting and by using kayexalate. Patient clinically improved although not back to baseline. Short course of steroids added to duo-nebs for wheezing. Appetite is poor. Patient was strongly advised to quit smoking (smokes continuously all day, per son), taking so much goody powder and stop carbonated drinks (drinks coke all day). Patient does not appear to be motivated to quit any. Stepped down to floor on 1/27.  PT/OT consulted. Neurology consulted on 1/28 for AMS- thought to be metabolic encephalopathy. Palliative care was consulted on 1/30/19 as patient was not eating and prognosis over-all was poor.      Interval History: No new issues     Review of Systems   Constitutional: Positive for activity change.   Respiratory: Negative for chest tightness and shortness of breath.    Cardiovascular: Negative for chest pain.   Gastrointestinal: Negative for abdominal pain.   Genitourinary: Negative for difficulty urinating.     Objective:     Vital Signs (Most Recent):  Temp: 98.9 °F (37.2 °C) (01/30/19 0741)  Pulse: 93 (01/30/19 0741)  Resp: 18 (01/30/19 0741)  BP: (!) 143/71 (01/30/19 0741)  SpO2: 100 % (01/30/19 0741) Vital Signs (24h Range):  Temp:  [97.5 °F (36.4 °C)-98.9 °F (37.2 °C)] 98.9 °F (37.2 °C)  Pulse:  [84-97] 93  Resp:  [17-20] 18  SpO2:  [95 %-100 %] 100 %  BP: (122-174)/(65-81) 143/71     Weight: 46.1 kg (101 lb 10.1 oz)  Body mass index is 18.59 kg/m².    Intake/Output Summary (Last 24 hours) at 1/30/2019 1018  Last data filed at 1/30/2019 0600  Gross per 24 hour   Intake 240 ml   Output --   Net 240 ml      Physical Exam   Constitutional: She is oriented to person, place, and time. She appears well-developed and well-nourished.   HENT:   Head: Normocephalic and  atraumatic.   Pulmonary/Chest: She has no wheezes.   Abdominal: There is no tenderness.   Neurological: She is alert and oriented to person, place, and time.   Nursing note and vitals reviewed.      Significant Labs:   BMP:   Recent Labs   Lab 01/30/19  0537   GLU 96  97     147*   K 4.4  4.5   *  114*   CO2 26  27   BUN 56*  56*   CREATININE 2.3*  2.3*   CALCIUM 8.6*  8.5*     CBC: No results for input(s): WBC, HGB, HCT, PLT in the last 48 hours.    Significant Imaging:    Assessment/Plan:      * Acute respiratory acidosis    Not much improved with BiPAP. Maybe very slight. Degree of metabolic acidosis cannot account for this  Workup thus far revealed new severely depressed LVEF to 25% with inferoposterior WMA  Trop initially mildly elevated to 0.089 now 2. Patient is chest pain free.   Unknown how long EF has been depressed but per son her SOB has been present for days. MI might not be acute  Has had a coronary stent placed 6 years ago, per son. On ASA daily but smokes cigarettes continuously and follows a high salt diet.   Currently not a good candidate for cardiac catheterization  Diuresed. BP at goal. Adequately diuresed and more alert although still with some resp acidosis  Took a lot of goody powder. Salicylate induced? Salicylate level is not elevated  Continue on low flow NC for now. Must quit smoking. Will test for home O2 prior to discharge  Cardiology and pulm on board for co-management- signed off.            Debility    PT/OT eval.      Delirium    Neurology consulted. Think may be metabolic encephalopathy.  +/- EEG. CT negative.        Tobacco use disorder, severe, dependence    Smoking all day long  Spent > 10 minutes discussing tobacco cessation  Patient does not appear to be motivated. Not interested on nicotine patch       NSTEMI (non-ST elevated myocardial infarction)    As above       Acute combined systolic and diastolic heart failure    As above       Acute respiratory  failure with hypoxia and hypercapnia    As above       Diabetes mellitus type 2 in nonobese    Hold metformin and start insulin  Adjust as needed for goal -180       Hypertension    Now better controlled  Restart carvedilol. Will add nifedipine if needed for goal SBP < 130       Acute renal failure    Unsure if true renal failure or side effect from bactrim. Son also reports taking a lot of goody powder  Hold off on metformin   Stopped fluids for pulm edema. Holding furosemide as I believe she was over diuresed (dry oral mucosa, better sats, elevated BUN). Renal function improved some with this. Nephrology on board for co-management   BMP in AM  Strict I/Os       Abnormal urinalysis    Abnormal urinalysis  UCx negative  Stop ceftriaxone       Hyperkalemia, diminished renal excretion    2/2 to bactrim vs renal failure vs both  Resolved     Added kayexalate          VTE Risk Mitigation (From admission, onward)        Ordered     heparin (porcine) injection 5,000 Units  Every 12 hours      01/29/19 0816     IP VTE HIGH RISK PATIENT  Once      01/23/19 1630        Patient not eating/taking meds. Poor over-all prognosis. Palliative care consulted.           Jay Mccurdy MD  Department of Hospital Medicine   [facility]

## 2019-01-30 NOTE — PROGRESS NOTES
TN met with patient's son Arsenio at bedside to discuss patient's baseline and discharge plan. Arsenio stated patient is usually oriented *4 and is able to do a few things herself; such as, clothes folding. Patient is usually in a wheelchair but also wears prosthesis to ambulate around her room. Arsenio plans for patient to discharge home with home health when ready; however,he is open to SNF placement if needed.

## 2019-01-30 NOTE — NURSING
Bedside report given to BENNY Negron. Pt appears to be resting with eyes closed. Pt on bipap. Safety maintained.

## 2019-01-30 NOTE — PLAN OF CARE
Problem: Adult Inpatient Plan of Care  Goal: Plan of Care Review   01/29/19 0620   Plan of Care Review   Plan of Care Reviewed With patient       Problem: Skin Injury Risk Increased  Goal: Skin Health and Integrity    Intervention: Optimize Skin Protection   01/28/19 1930 01/30/19 0030   Prevent Additional Skin Injury   Head of Bed (HOB) --  HOB at 20-30 degrees   Pressure Reduction Devices pressure-redistributing mattress utilized --    Pressure Reduction Techniques frequent weight shift encouraged --    Monitor and Manage Hypervolemia   Skin Protection incontinence pads utilized --          Problem: Diabetes Comorbidity  Goal: Blood Glucose Level Within Desired Range    Intervention: Maintain Glycemic Control   01/29/19 1425   Monitor and Manage Ketoacidosis   Glycemic Management blood glucose monitoring

## 2019-01-30 NOTE — ASSESSMENT & PLAN NOTE
Malnutrition in the context of Acute Illness/Injury    Related to (etiology):  PMH; acute respiratory failure    Signs and Symptoms (as evidenced by):  Energy Intake: <50% of estimated energy requirement for >/=5 days  Weight Loss: 15% < 1 month    Interventions/Recommendations (treatment strategy):  Commercial beverage    Nutrition Diagnosis Status:  New

## 2019-01-30 NOTE — PROGRESS NOTES
Zakiya Garcia is a 69 y.o. female patient.    Follow for REGLA    Lethargic, poorly responsive on BiPAP    Scheduled Meds:   albuterol-ipratropium  3 mL Nebulization Q4H WAKE    aspirin  81 mg Oral Daily    atorvastatin  40 mg Oral Daily    carvedilol  25 mg Oral BID    clopidogrel  75 mg Oral Daily    famotidine (PF)  20 mg Intravenous Daily    heparin (porcine)  5,000 Units Subcutaneous Q12H    predniSONE  60 mg Oral Daily       Review of patient's allergies indicates:  No Known Allergies      Vital Signs Range (Last 24H):  Temp:  [97.5 °F (36.4 °C)-98.9 °F (37.2 °C)]   Pulse:  [84-97]   Resp:  [17-20]   BP: (122-174)/(65-81)   SpO2:  [95 %-100 %]     I & O (Last 24H):    Intake/Output Summary (Last 24 hours) at 1/30/2019 0820  Last data filed at 1/30/2019 0600  Gross per 24 hour   Intake 240 ml   Output --   Net 240 ml           Physical Exam:  General appearance: well developed, cachectic, no distress  Lungs:  diminished breath sounds bilaterally  Heart: regular rate and rhythm  Abdomen: soft, non-tender non-distented; bowel sounds normal; no masses,  no organomegaly  Extremities: edema trace    Laboratory:  CBC:   Recent Labs   Lab 01/27/19  0204   WBC 6.30   RBC 3.00*   HGB 8.2*   HCT 26.9*      MCV 90   MCH 27.3   MCHC 30.5*     CMP:   Recent Labs   Lab 01/30/19  0537   GLU 96  97   CALCIUM 8.6*  8.5*   ALBUMIN 3.0*  3.0*   PROT 5.7*     147*   K 4.4  4.5   CO2 26  27   *  114*   BUN 56*  56*   CREATININE 2.3*  2.3*   ALKPHOS 66   ALT 20   AST 19   BILITOT 0.2       Imp/Plan    REGLA - creatinine continue improving  DM type 2  Hyperkalemia - improving  HTN  Acute hypercapnic respiratoty failure  NSTEMI  Anemia of chronic disease    Overall prognosis poor  Continue supportive care  CMP in am      Trac T Le  1/30/2019

## 2019-01-30 NOTE — PROGRESS NOTES
[facility]  Neurology  Progress Note    Patient Name: Zakiya Garcia  MRN: [id number]  Admission Date: 1/23/2019  Hospital Length of Stay: 7 days  Code Status: Full Code   Attending Provider: Jay Resendiz MD  Primary Care Physician: Ines Barragan MD   Principal Problem:Acute respiratory acidosis    Subjective:     Interval History: 68 y/o female with medical Hx as listed below comes to ED for shortness of breath. Workup found to have acute resp acidosis as well as heart failure (EF of 25%). On admission of pt was alert and talking but she has becoming increasingly lethargic over several days. Today pt was very restless and was given IV lorazepam. On convulsions reported. No unilateral weakness.     -1/29/19: Pt lethargic. On BiPAP after being found markedly hypercapnic    -1/30/19: Pt is somewhat more responsive.    Current Neurological Medications:     Current Facility-Administered Medications   Medication Dose Route Frequency Provider Last Rate Last Dose    acetaminophen tablet 650 mg  650 mg Oral Q4H PRN Helen Nettles MD        albuterol-ipratropium 2.5 mg-0.5 mg/3 mL nebulizer solution 3 mL  3 mL Nebulization Q4H WAKE Shyann Valdes MD   3 mL at 01/30/19 1131    aspirin EC tablet 81 mg  81 mg Oral Daily Shyann Valdes MD   Stopped at 01/30/19 0900    atorvastatin tablet 40 mg  40 mg Oral Daily Shyann Valdes MD   Stopped at 01/30/19 0900    carvedilol tablet 25 mg  25 mg Oral BID Shyann Valdes MD   Stopped at 01/30/19 0900    clopidogrel tablet 75 mg  75 mg Oral Daily Shyann Valdes MD   Stopped at 01/30/19 0900    dextrose 50% injection 12.5 g  12.5 g Intravenous PRN Shyann Valdes MD   12.5 g at 01/25/19 1203    famotidine (PF) injection 20 mg  20 mg Intravenous Daily Shyann Valdes MD   20 mg at 01/30/19 0837    glucagon (human recombinant) injection 1 mg  1 mg Intramuscular PRN Shyann Valdes MD        heparin (porcine) injection 5,000 Units   5,000 Units Subcutaneous Q12H Jay Resendiz MD   5,000 Units at 01/30/19 0837    hydrALAZINE injection 10 mg  10 mg Intravenous Q6H PRN Earl Rees MD   10 mg at 01/30/19 1155    insulin aspart U-100 pen 0-5 Units  0-5 Units Subcutaneous Q6H PRN Shyann Valdes MD   4 Units at 01/29/19 0858    lorazepam injection 2 mg  2 mg Intravenous Q4H PRN Jay Resendiz MD   2 mg at 01/29/19 1808    ondansetron injection 4 mg  4 mg Intravenous Q8H PRN Helen Nettles MD   4 mg at 01/25/19 1647    potassium phosphate 20 mmol in dextrose 5 % 500 mL infusion  20 mmol Intravenous Once Jay Resendiz  mL/hr at 01/30/19 1155 20 mmol at 01/30/19 1155    predniSONE tablet 60 mg  60 mg Oral Daily Jay Resendiz MD   Stopped at 01/30/19 0900    sodium chloride 0.9% flush 3 mL  3 mL Intravenous PRN Helen Nettles MD           Review of Systems       Objective:     Vital Signs (Most Recent):  Temp: 98.7 °F (37.1 °C) (01/30/19 1137)  Pulse: 95 (01/30/19 1137)  Resp: 18 (01/30/19 1137)  BP: (!) 164/76 (01/30/19 1137)  SpO2: 98 % (01/30/19 1137) Vital Signs (24h Range):  Temp:  [97.5 °F (36.4 °C)-98.9 °F (37.2 °C)] 98.7 °F (37.1 °C)  Pulse:  [85-97] 95  Resp:  [17-20] 18  SpO2:  [96 %-100 %] 98 %  BP: (131-174)/(67-81) 164/76     Weight: 46.1 kg (101 lb 10.1 oz)  Body mass index is 18.59 kg/m².    Physical Exam  Constitutional: No distress.   HENT:   Head: Normocephalic.   Eyes: Right eye exhibits no discharge. Left eye exhibits no discharge.   Neck: Normal range of motion.   Cardiovascular: Normal rate.   Pulmonary/Chest: Breath sounds normal.   Abdominal: Bowel sounds are normal.   Musculoskeletal: She exhibits no edema.   Skin: She is not diaphoretic.         NEUROLOGICAL EXAMINATION:      MENTAL STATUS        Does not respond to verbal stimuli.      CRANIAL NERVES      CN III, IV, VI   Pupils: pinpoint  Right pupil: Size: 2 mm.   Left pupil: Size: 2 mm.   Nystagmus: none   Conjugate  gaze: present     CN VII   Right facial weakness: none  Left facial weakness: none     MOTOR EXAM        Normal tone      GAIT AND COORDINATION      Tremor   Resting tremor: absent            Significant Labs:   CBC: No results for input(s): WBC, HGB, HCT, PLT in the last 48 hours.  CMP:   Recent Labs   Lab 01/29/19  0602 01/30/19  0537   *  277* 96  97     143 145  147*   K 5.2*  5.4* 4.4  4.5     110 114*  114*   CO2 25  27 26  27   BUN 57*  58* 56*  56*   CREATININE 2.7*  2.6* 2.3*  2.3*   CALCIUM 8.5*  8.4* 8.6*  8.5*   PROT 6.0 5.7*   ALBUMIN 3.2*  3.1* 3.0*  3.0*   BILITOT 0.2 0.2   ALKPHOS 73 66   AST 17 19   ALT 19 20   ANIONGAP 7*  6* 5*  6*   EGFRNONAA 17*  18* 21*  21*           Assessment and Plan:     68 y/o female consutled for AMS     1. AMS: likely the result of a metabolic encphalopathy (hypercapnic) and dementia. Hypoactive delirium may also be adding to her problem. Head CT with no abnormal findings.           -Would avoid benzodiazepines and opiates.     Still on BiPAP more responsive than yesterday. PCO2 decreasing.        Active Diagnoses:    Diagnosis Date Noted POA    PRINCIPAL PROBLEM:  Acute respiratory acidosis [E87.2] 01/23/2019 Yes    Severe malnutrition [E43] 01/30/2019 Unknown    Debility [R53.81] 01/29/2019 Yes    Delirium [R41.0] 01/26/2019 Yes    Pulmonary hypertension [I27.20] 01/24/2019 Yes    Acute combined systolic and diastolic heart failure [I50.41] 01/24/2019 Yes    NSTEMI (non-ST elevated myocardial infarction) [I21.4] 01/24/2019 Yes    Tobacco use disorder, severe, dependence [F17.200] 01/24/2019 Yes    Acute respiratory failure with hypoxia and hypercapnia [J96.01, J96.02] 01/23/2019 Yes    Acute renal failure [N17.9] 08/10/2013 Yes    Diabetes mellitus type 2 in nonobese [E11.9] 08/10/2013 Yes    Hypertension [I10] 08/10/2013 Yes    Abnormal urinalysis [R82.90] 08/10/2013 Yes    Hyperkalemia, diminished renal  excretion [E87.5] 08/09/2013 Yes      Problems Resolved During this Admission:       VTE Risk Mitigation (From admission, onward)        Ordered     heparin (porcine) injection 5,000 Units  Every 12 hours      01/29/19 0816     IP VTE HIGH RISK PATIENT  Once      01/23/19 1630          Chuy Castillo MD  Neurology  [facility]

## 2019-01-30 NOTE — PROGRESS NOTES
[facility]  Adult Nutrition  Consult Note    SUMMARY     Recommendations    1. Encourage adequate meal & oral nutr supplement intake as respiratory status allows given pt's bipap needs at times.     2. If meal intake remains inadequate over the next 48 hrs, RD to provide nutr support recs if this is consistent w/ POC  Goals:  >50% meal intake   Nutrition Goal Status: new  Communication of RD Recs: other (comment)(POC review)    Reason for Assessment    Reason For Assessment: identified at risk by screening criteria  Diagnosis: other (see comments)(acute respiratory acidosis 2/2 to heart failure)  Relevant Medical History: bilateral BKA, dementia, DM, HTN, current smoker, anemia of chronic disease  Interdisciplinary Rounds: did not attend  General Information Comments: Saw pt this AM, pt was asleep.  Pt's family was present to answer questions.  Son reported that pt didn't have much of an appetite PTA, and that he usually had to force her to eat any form of a nutritious meal.  When asked about possible wt changes, son thought that if anything she had maybe gained some wt, but nothing drastic.   Son confirmed that pt wasn't eating, as she was almost always sleeping and/or too tired to eat. Son agreed to try Boost Glucose Control in order to help meet pt's protein and energy needs during the short time she is awake.  It should be noted that pt's inadequate intake is further hindered by her being on bipap, and not being able to eat while receiving this. I obtained consent from the son to perform the NFPE, and the assessment was attempted today (1/30/19), but was unable to be completed due to the pt waking up and being very disoriented as well as agitated.  EPIC wt hx shows a significant wt loss of 8.3 kg since pt's last admit on 1/14/2019, and no other wt's are available since 2013, but a dx of malnutrition cannot be made at this time.   Palliative Care consult noted, RD to f/u with once  "aggressiveness of care has been determined.    Nutrition Discharge Planning: unable to determine at this time    Nutrition Risk Screen    Nutrition Risk Screen: dysphagia or difficulty swallowing    Nutrition/Diet History    Spiritual, Cultural Beliefs, Mosque Practices, Values that Affect Care: no  Food Allergies: NKFA  Factors Affecting Nutritional Intake: impaired cognitive status/motor control, other (see comments)(significant lethargy)    Anthropometrics    Temp: 98.7 °F (37.1 °C)  Height Method: Stated  Height: 5' 2" (157.5 cm)  Height (inches): 62 in  Weight Method: Bed Scale  Weight: 46.1 kg (101 lb 10.1 oz)  Weight (lb): 101.63 lb  Ideal Body Weight (IBW), Female: 110 lb  % Ideal Body Weight, Female (lb): 92.39 lb  BMI (Calculated): 18.6  BMI Grade: 18.5-24.9 - normal  Weight Loss: unintentional  Weight Change Amount: 18 lb 4.8 oz  Amputation %: 5.9, 5.9  Total Amputation %: 11.8  Amputation Ideal Body Weight (IBW), Female (lb): 98.2 lb  Amputee BMI (kg/m2): 21.13 kg/m2       Lab/Procedures/Meds    Pertinent Labs Reviewed: reviewed  Pertinent Medications Reviewed: reviewed  Pertinent Medications Comments: carvedilol, statin, prednisone      Estimated/Assessed Needs    Weight Used For Calorie Calculations: 46.1 kg (101 lb 10.1 oz)  Energy Calorie Requirements (kcal): 9546-7035(25-30 kcal/kg)  Energy Need Method: Kcal/kg  Protein Requirements: 55-65g (1.2-1.4g/kg)  Weight Used For Protein Calculations: 46.1 kg (101 lb 10.1 oz)     Estimated Fluid Requirement Method: RDA Method  RDA Method (mL): 1153  CHO Requirement: 144-172g      Nutrition Prescription Ordered    Current Diet Order: Dysphagia Pureed Renal, Cardiac    Evaluation of Received Nutrient/Fluid Intake    I/O: reviewed  Energy Calories Required: not meeting needs  Protein Required: not meeting needs  Fluid Required: (as per MD)  Comments: LBM 1/29  % Intake of Estimated Energy Needs: 0 - 25 %  % Meal Intake: 0 - 25 %    Nutrition Risk    Level of " Risk/Frequency of Follow-up: (2x/wk)     Assessment and Plan    Severe malnutrition    Malnutrition in the context of Acute Illness/Injury    Related to (etiology):  PMH; acute respiratory failure    Signs and Symptoms (as evidenced by):  Energy Intake: <50% of estimated energy requirement for >/=5 days  Weight Loss: 15% < 1 month    Interventions:  Commercial beverage    Nutrition Diagnosis Status:  New              Monitor and Evaluation    Food and Nutrient Intake: energy intake, food and beverage intake  Food and Nutrient Adminstration: diet order  Knowledge/Beliefs/Attitudes: food and nutrition knowledge/skill, beliefs and attitudes  Physical Activity and Function: nutrition-related ADLs and IADLs  Anthropometric Measurements: weight, weight change, body mass index  Biochemical Data, Medical Tests and Procedures: electrolyte and renal panel, glucose/endocrine profile  Nutrition-Focused Physical Findings: overall appearance     Malnutrition Assessment  Malnutrition Type: acute illness or injury          Weight Loss (Malnutrition): greater than 5% in 1 month  Energy Intake (Malnutrition): less than or equal to 50% for greater than or equal to 5 days               Subcutaneous Fat Loss (Final Summary): severe protein-calorie malnutrition  Muscle Loss Evaluation (Final Summary): severe protein-calorie malnutrition         Nutrition Follow-Up    RD Follow-up?: Yes

## 2019-01-30 NOTE — PT/OT/SLP PROGRESS
Physical Therapy      Patient Name:  Zakiya Garcia   MRN:  [id number]    Patient not seen today secondary to other (on BiPap and unable to follow commands to actively participate in PT evaluation). Will follow-up again tomorrow.    Radha Linares, PT

## 2019-01-30 NOTE — PT/OT/SLP PROGRESS
Occupational Therapy      Patient Name:  Zakiya Garcia   MRN:  [id number]    Patient not seen today secondary to Other (Comment)(The patient is currently on BiPAP and unable to follow commands.). Will follow-up tomorrow. OT eval will be completed as appropriate.    Crystal Artis, OT  1/30/2019

## 2019-01-30 NOTE — NURSING
Report received from BENNY Ocampo. Patient resting quietly while in bipap. NAD noted. Safety maintained. Will continue to monitor.

## 2019-01-30 NOTE — NURSING
Notified Dr. Mccurdy and charge nurse that pt remains drowsy and is unable to take meds or eat breakfast. No new orders at this time. Will continue to monitor pt.

## 2019-01-30 NOTE — PLAN OF CARE
Problem: Adult Inpatient Plan of Care  Goal: Plan of Care Review  Recommendations    1. Encourage adequate meal & oral nutr supplement intake as respiratory status allows given pt's bipap needs at times.     2. If meal intake remains inadequate over the next 48 hrs, RD to provide nutr support recs if this is consistent w/ POC  Goals:  >50% meal intake   Nutrition Goal Status: new

## 2019-01-30 NOTE — PLAN OF CARE
Problem: Fall Injury Risk  Goal: Absence of Fall and Fall-Related Injury    Intervention: Promote Injury-Free Environment   01/30/19 1440   Optimize Balance and Safe Activity   Safety Promotion/Fall Prevention bed alarm set   Optimize [place] and Functional Mobility   Environmental Safety Modification lighting adjusted;clutter free environment maintained         Problem: Skin Injury Risk Increased  Goal: Skin Health and Integrity    Intervention: Optimize Skin Protection   01/30/19 1440   Prevent Additional Skin Injury   Head of Bed (HOB) HOB elevated   Pressure Reduction Devices positioning supports utilized     Intervention: Promote and Optimize Oral Intake   01/30/19 1440   Monitor and Manage Anemia   Oral Nutrition Promotion social interaction promoted      01/30/19 1440   Monitor and Manage Anemia   Oral Nutrition Promotion social interaction promoted         Problem: Diabetes Comorbidity  Goal: Blood Glucose Level Within Desired Range    Intervention: Maintain Glycemic Control   01/30/19 1440   Monitor and Manage Ketoacidosis   Glycemic Management blood glucose monitoring

## 2019-01-30 NOTE — SUBJECTIVE & OBJECTIVE
Interval History: No new issues     Review of Systems   Constitutional: Positive for activity change.   Respiratory: Negative for chest tightness and shortness of breath.    Cardiovascular: Negative for chest pain.   Gastrointestinal: Negative for abdominal pain.   Genitourinary: Negative for difficulty urinating.     Objective:     Vital Signs (Most Recent):  Temp: 98.9 °F (37.2 °C) (01/30/19 0741)  Pulse: 93 (01/30/19 0741)  Resp: 18 (01/30/19 0741)  BP: (!) 143/71 (01/30/19 0741)  SpO2: 100 % (01/30/19 0741) Vital Signs (24h Range):  Temp:  [97.5 °F (36.4 °C)-98.9 °F (37.2 °C)] 98.9 °F (37.2 °C)  Pulse:  [84-97] 93  Resp:  [17-20] 18  SpO2:  [95 %-100 %] 100 %  BP: (122-174)/(65-81) 143/71     Weight: 46.1 kg (101 lb 10.1 oz)  Body mass index is 18.59 kg/m².    Intake/Output Summary (Last 24 hours) at 1/30/2019 1018  Last data filed at 1/30/2019 0600  Gross per 24 hour   Intake 240 ml   Output --   Net 240 ml      Physical Exam   Constitutional: She is oriented to person, place, and time. She appears well-developed and well-nourished.   HENT:   Head: Normocephalic and atraumatic.   Pulmonary/Chest: She has no wheezes.   Abdominal: There is no tenderness.   Neurological: She is alert and oriented to person, place, and time.   Nursing note and vitals reviewed.      Significant Labs:   BMP:   Recent Labs   Lab 01/30/19  0537   GLU 96  97     147*   K 4.4  4.5   *  114*   CO2 26  27   BUN 56*  56*   CREATININE 2.3*  2.3*   CALCIUM 8.6*  8.5*     CBC: No results for input(s): WBC, HGB, HCT, PLT in the last 48 hours.    Significant Imaging:

## 2019-01-30 NOTE — NURSING
Notified Dr. Mccurdy that pt is moaning and leaning out of bed. Also, notified MD that pt remains on bipap and will not follow commands. Ordered to give PRN dose of ativan. Will follow through with orders and continue to monitor pt.

## 2019-01-31 LAB
ALBUMIN SERPL BCP-MCNC: 3.2 G/DL
ALBUMIN SERPL BCP-MCNC: 3.2 G/DL
ALP SERPL-CCNC: 69 U/L
ALT SERPL W/O P-5'-P-CCNC: 18 U/L
ANION GAP SERPL CALC-SCNC: 9 MMOL/L
ANION GAP SERPL CALC-SCNC: 9 MMOL/L
AST SERPL-CCNC: 12 U/L
BILIRUB SERPL-MCNC: 0.2 MG/DL
BUN SERPL-MCNC: 49 MG/DL
BUN SERPL-MCNC: 50 MG/DL
CALCIUM SERPL-MCNC: 9 MG/DL
CALCIUM SERPL-MCNC: 9.1 MG/DL
CHLORIDE SERPL-SCNC: 112 MMOL/L
CHLORIDE SERPL-SCNC: 112 MMOL/L
CO2 SERPL-SCNC: 25 MMOL/L
CO2 SERPL-SCNC: 26 MMOL/L
CREAT SERPL-MCNC: 1.9 MG/DL
CREAT SERPL-MCNC: 1.9 MG/DL
EST. GFR  (AFRICAN AMERICAN): 30 ML/MIN/1.73 M^2
EST. GFR  (AFRICAN AMERICAN): 30 ML/MIN/1.73 M^2
EST. GFR  (NON AFRICAN AMERICAN): 26 ML/MIN/1.73 M^2
EST. GFR  (NON AFRICAN AMERICAN): 26 ML/MIN/1.73 M^2
GLUCOSE SERPL-MCNC: 107 MG/DL
GLUCOSE SERPL-MCNC: 108 MG/DL
PHOSPHATE SERPL-MCNC: 3.3 MG/DL
POCT GLUCOSE: 112 MG/DL (ref 70–110)
POCT GLUCOSE: 116 MG/DL (ref 70–110)
POCT GLUCOSE: 127 MG/DL (ref 70–110)
POCT GLUCOSE: 96 MG/DL (ref 70–110)
POTASSIUM SERPL-SCNC: 4.8 MMOL/L
POTASSIUM SERPL-SCNC: 5 MMOL/L
PROT SERPL-MCNC: 6.1 G/DL
SODIUM SERPL-SCNC: 146 MMOL/L
SODIUM SERPL-SCNC: 147 MMOL/L

## 2019-01-31 PROCEDURE — 36415 COLL VENOUS BLD VENIPUNCTURE: CPT

## 2019-01-31 PROCEDURE — 94640 AIRWAY INHALATION TREATMENT: CPT

## 2019-01-31 PROCEDURE — 63600175 PHARM REV CODE 636 W HCPCS: Performed by: INTERNAL MEDICINE

## 2019-01-31 PROCEDURE — 21400001 HC TELEMETRY ROOM

## 2019-01-31 PROCEDURE — 25000242 PHARM REV CODE 250 ALT 637 W/ HCPCS: Performed by: INTERNAL MEDICINE

## 2019-01-31 PROCEDURE — 99233 PR SUBSEQUENT HOSPITAL CARE,LEVL III: ICD-10-PCS | Mod: ,,, | Performed by: NURSE PRACTITIONER

## 2019-01-31 PROCEDURE — 94761 N-INVAS EAR/PLS OXIMETRY MLT: CPT

## 2019-01-31 PROCEDURE — 80069 RENAL FUNCTION PANEL: CPT

## 2019-01-31 PROCEDURE — 94660 CPAP INITIATION&MGMT: CPT

## 2019-01-31 PROCEDURE — 99233 SBSQ HOSP IP/OBS HIGH 50: CPT | Mod: ,,, | Performed by: NURSE PRACTITIONER

## 2019-01-31 PROCEDURE — 99900035 HC TECH TIME PER 15 MIN (STAT)

## 2019-01-31 PROCEDURE — 80053 COMPREHEN METABOLIC PANEL: CPT

## 2019-01-31 RX ADMIN — HEPARIN SODIUM 5000 UNITS: 5000 INJECTION, SOLUTION INTRAVENOUS; SUBCUTANEOUS at 08:01

## 2019-01-31 RX ADMIN — IPRATROPIUM BROMIDE AND ALBUTEROL SULFATE 3 ML: .5; 3 SOLUTION RESPIRATORY (INHALATION) at 11:01

## 2019-01-31 RX ADMIN — IPRATROPIUM BROMIDE AND ALBUTEROL SULFATE 3 ML: .5; 3 SOLUTION RESPIRATORY (INHALATION) at 04:01

## 2019-01-31 RX ADMIN — LORAZEPAM 2 MG: 2 INJECTION, SOLUTION INTRAMUSCULAR; INTRAVENOUS at 08:01

## 2019-01-31 RX ADMIN — IPRATROPIUM BROMIDE AND ALBUTEROL SULFATE 3 ML: .5; 3 SOLUTION RESPIRATORY (INHALATION) at 09:01

## 2019-01-31 RX ADMIN — IPRATROPIUM BROMIDE AND ALBUTEROL SULFATE 3 ML: .5; 3 SOLUTION RESPIRATORY (INHALATION) at 07:01

## 2019-01-31 NOTE — SUBJECTIVE & OBJECTIVE
Past Medical History:   Diagnosis Date    Amputee, below knee bilateral    Cigarette smoker     Dementia     Diabetes mellitus     Hypertension     Requires assistance with activities of daily living (ADL)     Wheelchair dependent        Past Surgical History:   Procedure Laterality Date    bka      cardiac stents      HYSTERECTOMY         Review of patient's allergies indicates:  No Known Allergies    Medications:  Continuous Infusions:  Scheduled Meds:   albuterol-ipratropium  3 mL Nebulization Q4H WAKE    aspirin  81 mg Oral Daily    atorvastatin  40 mg Oral Daily    carvedilol  25 mg Oral BID    clopidogrel  75 mg Oral Daily    famotidine (PF)  20 mg Intravenous Daily    heparin (porcine)  5,000 Units Subcutaneous Q12H    predniSONE  60 mg Oral Daily     PRN Meds:acetaminophen, dextrose 50%, glucagon (human recombinant), hydrALAZINE, insulin aspart U-100, lorazepam, ondansetron, sodium chloride 0.9%    Family History     None        Tobacco Use    Smoking status: Current Every Day Smoker     Packs/day: 1.00     Years: 15.00     Pack years: 15.00     Types: Cigarettes    Smokeless tobacco: Never Used   Substance and Sexual Activity    Alcohol use: No    Drug use: No    Sexual activity: Not on file       Review of Systems   Unable to perform ROS: Acuity of condition     Objective:     Vital Signs (Most Recent):  Temp: 97.9 °F (36.6 °C) (01/31/19 0741)  Pulse: 102 (01/31/19 0741)  Resp: 17 (01/31/19 0741)  BP: (!) 147/79 (01/31/19 0741)  SpO2: 100 % (01/31/19 0741) Vital Signs (24h Range):  Temp:  [97.8 °F (36.6 °C)-98.7 °F (37.1 °C)] 97.9 °F (36.6 °C)  Pulse:  [] 102  Resp:  [17-20] 17  SpO2:  [97 %-100 %] 100 %  BP: (130-167)/(74-83) 147/79     Weight: 44.7 kg (98 lb 8.7 oz)  Body mass index is 18.02 kg/m².        Physical Exam   Pulmonary/Chest:   bipap   Abdominal: Soft. Bowel sounds are normal.   Musculoskeletal:   Bilateral BKA   Neurological:   obtunded   Skin: Skin is warm  and dry.   Nursing note and vitals reviewed.      Significant Labs: All pertinent labs within the past 24 hours have been reviewed.    Significant Imaging: I have reviewed all pertinent imaging results/findings within the past 24 hours.    Advanced Directives::  Living Will: No  [place]: No  Do Not Resuscitate Status: Patient is a full code  Medical Power of : No    Decision-Making Capacity: Patient unable to communicate due to disease severity/cognitive impairment    Living Arrangements: Lives with family; son    ASSESSMENT/PLAN:    Palliative encounter:      Bedside consult. Patient on bipap and hard to arouse. She will open her eyes and start grunting but nothing else. No family at bedside. I will contact family to arrange meeting to discuss GOC.

## 2019-01-31 NOTE — PT/OT/SLP PROGRESS
Speech Language Pathology      Zakiya Garcia  MRN: [id number]    Patient not seen today secondary to Other (Comment)(Pt remains on BIPAP and somnolent). Will follow-up 2/1/19.    Virginia Cole, CCC-SLP

## 2019-01-31 NOTE — SUBJECTIVE & OBJECTIVE
Interval History: Not eating much. No real improvement     Review of Systems   Constitutional: Positive for activity change.   Respiratory: Negative for chest tightness and shortness of breath.    Cardiovascular: Negative for chest pain.   Gastrointestinal: Negative for abdominal pain.   Genitourinary: Negative for difficulty urinating.     Objective:     Vital Signs (Most Recent):  Temp: 97.9 °F (36.6 °C) (01/31/19 0741)  Pulse: 102 (01/31/19 0741)  Resp: 17 (01/31/19 0741)  BP: (!) 147/79 (01/31/19 0741)  SpO2: 100 % (01/31/19 0741) Vital Signs (24h Range):  Temp:  [97.8 °F (36.6 °C)-98.7 °F (37.1 °C)] 97.9 °F (36.6 °C)  Pulse:  [] 102  Resp:  [17-20] 17  SpO2:  [97 %-100 %] 100 %  BP: (130-167)/(74-83) 147/79     Weight: 44.7 kg (98 lb 8.7 oz)  Body mass index is 18.02 kg/m².  No intake or output data in the 24 hours ending 01/31/19 1102   Physical Exam   Constitutional: She is oriented to person, place, and time. She appears well-developed and well-nourished.   HENT:   Head: Normocephalic and atraumatic.   Pulmonary/Chest: She has no wheezes.   Abdominal: There is no tenderness.   Neurological: She is alert and oriented to person, place, and time.   Nursing note and vitals reviewed.      Significant Labs:   BMP:   Recent Labs   Lab 01/31/19  0648     107   *  146*   K 5.0  4.8   *  112*   CO2 26  25   BUN 50*  49*   CREATININE 1.9*  1.9*   CALCIUM 9.0  9.1     CBC: No results for input(s): WBC, HGB, HCT, PLT in the last 48 hours.    Significant Imaging:

## 2019-01-31 NOTE — NURSING
Report received from BENNY Ocampo. Patient resting quietly. NAD noted. Family at bedside. Safety maintained. Will continue to monitor.

## 2019-01-31 NOTE — HPI
Principal Problem:Acute respiratory acidosis     HPI:  69 year old female with hypertension, dementia, s/p dorota BKA, hyperlipidemia, smoker and dementia who presented with shortness of breath. Per EMS, patient's sats were 90% at room air then 100% after breathing treatment and a dose of solumedrol 125 mg IM. Patient's son reported gradual worsening of non productive cough as well as emesis after coughing this AM. Chest pain when coughing. Patient was last seen in the ED on 1/14 (~10 days PTA) for a UTI. Was prescribed bactrim BID x 7 days. Patient is not sure if still taking metformin or lisinopril. Son will bring list of meds.      In the ED, patient was tachycardic, with low grade fever, with tachypnea and sat 92% at room air. Lungs clear to auscultation and XRay without evidence of consolidation or edema. Labs showed moderate hyperkalemia, acute renal failure and elevated lactic acid. ABG showed pH of 7.0 with hypercapnia. Patient admitted to ICU for close monitoring given such significant metabolic derangements.      Hospital Course:  Ms Garcia presented with shortness of breath and decreased appetite. Workup in the ED significant for moderate hyperkalemia (6.4), acute renal failure, elevated lactic acid (2.3) and acute respiratory acidosis (pH of 7, pCO2 of 74.4). Patient was surprisingly very alert and conversational, but lab results very concerning. ABG without significant change despite BiPAP. Admitted to ICU for close observation. Pulmonology and nephrology consulted. Respiratory acidosis persisted despite multiple attempts on BiPAP. Degree of metabolic acidosis is not severe enough to be main cause. Furosemide and bicarb did not help much either. Further workup revealed elevated trop of 2 (0.08 in the ED) and LVEF of 25% with global hypokinesis with inferoposterior WMA. This is new. May also have undiagnosed emphysema. Hyperkalemia resolved with shifting and by using kayexalate. Patient clinically improved  although not back to baseline. Short course of steroids added to duo-nebs for wheezing. Appetite is poor. Patient was strongly advised to quit smoking (smokes continuously all day, per son), taking so much goody powder and stop carbonated drinks (drinks coke all day). Patient does not appear to be motivated to quit any. Stepped down to floor on 1/27.  PT/OT consulted. Neurology consulted on 1/28 for AMS- thought to be metabolic encephalopathy. Palliative care was consulted on 1/30/19 as patient was not eating and prognosis over-all was poor.

## 2019-01-31 NOTE — PT/OT/SLP PROGRESS
Occupational Therapy      Patient Name:  Zakiya Garcia   MRN:  [id number]    OT attempted to perform OT eval x3. The patient remains lethargic and on the BiPap. OT will D/C. Please re-order if patient becomes more alert    Crystal Artis OT  1/31/2019

## 2019-01-31 NOTE — PLAN OF CARE
Problem: Adult Inpatient Plan of Care  Goal: Plan of Care Review   01/29/19 0620   Plan of Care Review   Plan of Care Reviewed With patient       Problem: Coping Ineffective  Goal: Effective Coping    Intervention: Support and Enhance Coping Strategies   01/31/19 0431   Monitor/Manage Chemotherapy Gastrointestinal Effects   Environmental Support calm environment promoted;environmental consistency promoted   Promote Anxiety Reduction   Family/Support System Care presence promoted   Supportive Measures active listening utilized;verbalization of feelings encouraged         Problem: Self-Care Deficit  Goal: Improved Ability to Complete Activities of Daily Living    Intervention: Promote Activity and Functional [place]   01/29/19 0620 01/30/19 2030   Identify and Manage Contributors to Fall Injury Risk   Self-Care Promotion independence encouraged;BADL personal objects within reach;BADL personal routines maintained --    Promote Activity and Functional [place]   Activity Assistance Provided --  assistance, 1 person            I have reviewed and confirmed nurses' notes for patient's medications, allergies, medical history, and surgical history.

## 2019-01-31 NOTE — PT/OT/SLP PROGRESS
Physical Therapy      Patient Name:  Zakiya Garcia   MRN:  [id number]    Patient not seen today secondary to other (letharigc and on BiPap). She continues to be unable to actively participate in PT evaluation. Per nursing staff, she does not follow commands. PT to sign off due to patient being unable to participate 3 days in a row and family discussing hospice care. Nurse Tri notified. Please re-consult PT if patient becomes able to participate in PT evaluation.     Radha Linares, PT

## 2019-01-31 NOTE — PROGRESS NOTES
"Patient continues to require BiPaP continuously. She is hard to arouse and becomes very agitated when you try to awaken her.   Met with patient's oldest son Dean today at bedside. Discussed the patient's clinical condition, goals of care, options, long term outcome and poor prognosis. He states he sees his mother is not getting better and he does not want her to suffer. He is very realistic and states his wife is a nurse practitioner and he just talked with her last night about his  mothers declining condition.   Code status pros and cons discussed in detail and he agrees with the DNR but wants to talk with his siblings before making a final decision. I let him know she could decompensate at any time and the medical team does not feel its in her best interest or medically appropriate to do CPR. Discussed her poor outcome if she were to be resuscitated and that she would then be on vent support and their decisions would be harder s as she would most likely not ever be able to come off vent.  We also discussed hospice as an option if she continues to decline or not make any improvement. He reports his father was on hospice 5 years ago and  at home so he understands what hospice is about.  All questions and concerns addressed and    I asked him to contact his siblings  today so we can discuss this today. He is going to call his siblings now and let me know today.  Emotional support provided. Updated Dr Resendiz.         -continue current treatment  -son Dean agrees with DNR and will speak with siblings today for final decision  -Recommend hospice if no improvement  -Palliative to continue to follow      Addendum: 1600: more family members at bedside after receiving a message from patient's children that the patient was not doing well. Patient's niece states " I have prayed over her and for God's will to be done." "She has been through so much and they just need to let her go in peace."  Emotional support " provided.    Updated family She stateEmotional support provided.     > 50% of 35 min visit spent in chart review, face to face discussion of goals of care,  symptom assessment, coordination of care and emotional support.

## 2019-01-31 NOTE — PROGRESS NOTES
Zakiya Garcia is a 69 y.o. female patient.    Follow for Roger    On BiPAP  Agitated    Scheduled Meds:   albuterol-ipratropium  3 mL Nebulization Q4H WAKE    aspirin  81 mg Oral Daily    atorvastatin  40 mg Oral Daily    carvedilol  25 mg Oral BID    clopidogrel  75 mg Oral Daily    famotidine (PF)  20 mg Intravenous Daily    heparin (porcine)  5,000 Units Subcutaneous Q12H    predniSONE  60 mg Oral Daily       Review of patient's allergies indicates:  No Known Allergies      Vital Signs Range (Last 24H):  Temp:  [97.8 °F (36.6 °C)-98.7 °F (37.1 °C)]   Pulse:  []   Resp:  [17-20]   BP: (130-167)/(74-83)   SpO2:  [97 %-100 %]     I & O (Last 24H):No intake or output data in the 24 hours ending 01/31/19 1100        Physical Exam:  General appearance: well developed, cachectic, no distress  Lungs:  clear to auscultation bilaterally and normal respiratory effort  Heart: regular rate and rhythm  Abdomen: soft, non-tender non-distented; bowel sounds normal; no masses,  no organomegaly  Extremities: no cyanosis or edema, or clubbing    Laboratory:  CBC:   Recent Labs   Lab 01/27/19  0204   WBC 6.30   RBC 3.00*   HGB 8.2*   HCT 26.9*      MCV 90   MCH 27.3   MCHC 30.5*     CMP:   Recent Labs   Lab 01/31/19  0648     107   CALCIUM 9.0  9.1   ALBUMIN 3.2*  3.2*   PROT 6.1   *  146*   K 5.0  4.8   CO2 26  25   *  112*   BUN 50*  49*   CREATININE 1.9*  1.9*   ALKPHOS 69   ALT 18   AST 12   BILITOT 0.2       Imp/Plan    ROGER - creatinine improving  Hypercapnic respiratory failure  DM type 2  Hyperkalemia - resolved  HTN    Discussed with patient family at bedside, son and daughter  Recommend comfort care/hospice  Nothing else to offer on renal stand point  We'll sign off  Elysia Quiroga  1/31/2019

## 2019-01-31 NOTE — NURSING
Bedside report given to BENNY Negron. Pt is appears to be in no distress. Family at bedside. Safety maintained.

## 2019-01-31 NOTE — PROGRESS NOTES
[facility] Hospital Medicine  Progress Note    Patient Name: Zakiya Garcia  MRN: [id number]  Patient Class: IP- Inpatient   Admission Date: 1/23/2019  Length of Stay: 8 days  Attending Physician: Jay Resendiz MD  Primary Care Provider: Ines Barragan MD        Subjective:     Principal Problem:Acute respiratory acidosis    HPI:  69 year old female with hypertension, dementia, s/p dorota BKA, hyperlipidemia, smoker and dementia who presented with shortness of breath. Per EMS, patient's sats were 90% at room air then 100% after breathing treatment and a dose of solumedrol 125 mg IM. Patient's son reported gradual worsening of non productive cough as well as emesis after coughing this AM. Chest pain when coughing. Patient was last seen in the ED on 1/14 (~10 days PTA) for a UTI. Was prescribed bactrim BID x 7 days. Patient is not sure if still taking metformin or lisinopril. Son will bring list of meds.     In the ED, patient was tachycardic, with low grade fever, with tachypnea and sat 92% at room air. Lungs clear to auscultation and XRay without evidence of consolidation or edema. Labs showed moderate hyperkalemia, acute renal failure and elevated lactic acid. ABG showed pH of 7.0 with hypercapnia. Patient admitted to ICU for close monitoring given such significant metabolic derangements.     Hospital Course:  Ms Garcia presented with shortness of breath and decreased appetite. Workup in the ED significant for moderate hyperkalemia (6.4), acute renal failure, elevated lactic acid (2.3) and acute respiratory acidosis (pH of 7, pCO2 of 74.4). Patient was surprisingly very alert and conversational, but lab results very concerning. ABG without significant change despite BiPAP. Admitted to ICU for close observation. Pulmonology and nephrology consulted. Respiratory acidosis persisted despite multiple attempts on BiPAP. Degree of metabolic acidosis is not severe enough to be main cause. Furosemide  and bicarb did not help much either. Further workup revealed elevated trop of 2 (0.08 in the ED) and LVEF of 25% with global hypokinesis with inferoposterior WMA. This is new. May also have undiagnosed emphysema. Hyperkalemia resolved with shifting and by using kayexalate. Patient clinically improved although not back to baseline. Short course of steroids added to duo-nebs for wheezing. Appetite is poor. Patient was strongly advised to quit smoking (smokes continuously all day, per son), taking so much goody powder and stop carbonated drinks (drinks coke all day). Patient does not appear to be motivated to quit any. Stepped down to floor on 1/27.  PT/OT consulted. Neurology consulted on 1/28 for AMS- thought to be metabolic encephalopathy. Palliative care was consulted on 1/30/19 as patient was not eating and prognosis over-all was poor.      Interval History: Not eating much. No real improvement     Review of Systems   Constitutional: Positive for activity change.   Respiratory: Negative for chest tightness and shortness of breath.    Cardiovascular: Negative for chest pain.   Gastrointestinal: Negative for abdominal pain.   Genitourinary: Negative for difficulty urinating.     Objective:     Vital Signs (Most Recent):  Temp: 97.9 °F (36.6 °C) (01/31/19 0741)  Pulse: 102 (01/31/19 0741)  Resp: 17 (01/31/19 0741)  BP: (!) 147/79 (01/31/19 0741)  SpO2: 100 % (01/31/19 0741) Vital Signs (24h Range):  Temp:  [97.8 °F (36.6 °C)-98.7 °F (37.1 °C)] 97.9 °F (36.6 °C)  Pulse:  [] 102  Resp:  [17-20] 17  SpO2:  [97 %-100 %] 100 %  BP: (130-167)/(74-83) 147/79     Weight: 44.7 kg (98 lb 8.7 oz)  Body mass index is 18.02 kg/m².  No intake or output data in the 24 hours ending 01/31/19 1102   Physical Exam   Constitutional: She is oriented to person, place, and time. She appears well-developed and well-nourished.   HENT:   Head: Normocephalic and atraumatic.   Pulmonary/Chest: She has no wheezes.   Abdominal: There is no  tenderness.   Neurological: She is alert and oriented to person, place, and time.   Nursing note and vitals reviewed.      Significant Labs:   BMP:   Recent Labs   Lab 01/31/19  0648     107   *  146*   K 5.0  4.8   *  112*   CO2 26  25   BUN 50*  49*   CREATININE 1.9*  1.9*   CALCIUM 9.0  9.1     CBC: No results for input(s): WBC, HGB, HCT, PLT in the last 48 hours.    Significant Imaging:    Assessment/Plan:      * Acute respiratory acidosis    Not much improved with BiPAP. Maybe very slight. Degree of metabolic acidosis cannot account for this  Workup thus far revealed new severely depressed LVEF to 25% with inferoposterior WMA  Trop initially mildly elevated to 0.089 now 2. Patient is chest pain free.   Unknown how long EF has been depressed but per son her SOB has been present for days. MI might not be acute  Has had a coronary stent placed 6 years ago, per son. On ASA daily but smokes cigarettes continuously and follows a high salt diet.   Currently not a good candidate for cardiac catheterization  Diuresed. BP at goal. Adequately diuresed and more alert although still with some resp acidosis  Took a lot of goody powder. Salicylate induced? Salicylate level is not elevated  Continue on low flow NC for now. Must quit smoking. Will test for home O2 prior to discharge  Cardiology and pulm on board for co-management- signed off.            Palliative care encounter    Discussed case this am. Family leaning towards DNR and hospice. Will meet with palliative care later.          Debility    PT/OT eval.      Delirium    Neurology consulted. Think may be metabolic encephalopathy.  +/- EEG. CT negative.        Tobacco use disorder, severe, dependence    Smoking all day long  Spent > 10 minutes discussing tobacco cessation  Patient does not appear to be motivated. Not interested on nicotine patch       NSTEMI (non-ST elevated myocardial infarction)    As above       Acute combined systolic and  diastolic heart failure    As above       Acute respiratory failure with hypoxia and hypercapnia    As above       Diabetes mellitus type 2 in nonobese    Hold metformin and start insulin  Adjust as needed for goal -180       Hypertension    Now better controlled  Restart carvedilol. Will add nifedipine if needed for goal SBP < 130       Acute renal failure    Unsure if true renal failure or side effect from bactrim. Son also reports taking a lot of goody powder  Hold off on metformin   Stopped fluids for pulm edema. Holding furosemide as I believe she was over diuresed (dry oral mucosa, better sats, elevated BUN). Renal function improved some with this. Nephrology on board for co-management   BMP in AM  Strict I/Os       Abnormal urinalysis    Abnormal urinalysis  UCx negative  Stop ceftriaxone       Hyperkalemia, diminished renal excretion    2/2 to bactrim vs renal failure vs both  Resolved     Added kayexalate          VTE Risk Mitigation (From admission, onward)        Ordered     heparin (porcine) injection 5,000 Units  Every 12 hours      01/29/19 0816     IP VTE HIGH RISK PATIENT  Once      01/23/19 1630              Jay Mccurdy MD  Department of Hospital Medicine   [facility]

## 2019-01-31 NOTE — ASSESSMENT & PLAN NOTE
Discussed case this am. Family leaning towards DNR and hospice. Will meet with palliative care later.

## 2019-01-31 NOTE — PLAN OF CARE
Palliative care consulted for goals of care and possible hospice. Awaiting family decision.       01/31/19 1020   Discharge Reassessment   Assessment Type Discharge Planning Reassessment   Provided patient/caregiver education on the expected discharge date and the discharge plan Yes   Discharge Plan A Home;Home with family;Home Health   Discharge Plan B Hospice/home   Anticipated Discharge Disposition HospiceHome   Can the patient answer the patient profile reliably? No, cognitively impaired   How does the patient rate their overall health at the present time? Fair   Describe the patient's ability to walk at the present time. Does not walk or unable to take any steps at all   How often would a person be available to care for the patient? Often   Number of comorbid conditions (as recorded on the chart) Five or more

## 2019-02-01 LAB
ALBUMIN SERPL BCP-MCNC: 3 G/DL
ALLENS TEST: ABNORMAL
ANION GAP SERPL CALC-SCNC: 9 MMOL/L
BUN SERPL-MCNC: 44 MG/DL
CALCIUM SERPL-MCNC: 8.8 MG/DL
CHLORIDE SERPL-SCNC: 114 MMOL/L
CO2 SERPL-SCNC: 28 MMOL/L
CREAT SERPL-MCNC: 1.6 MG/DL
DELSYS: ABNORMAL
EP: 5
ERYTHROCYTE [SEDIMENTATION RATE] IN BLOOD BY WESTERGREN METHOD: 16 MM/H
EST. GFR  (AFRICAN AMERICAN): 37 ML/MIN/1.73 M^2
EST. GFR  (NON AFRICAN AMERICAN): 32 ML/MIN/1.73 M^2
FIO2: 28
GLUCOSE SERPL-MCNC: 74 MG/DL
HCO3 UR-SCNC: 25.7 MMOL/L (ref 24–28)
IP: 18
MIN VOL: 5.1
MODE: ABNORMAL
PCO2 BLDA: 56.3 MMHG (ref 35–45)
PH SMN: 7.27 [PH] (ref 7.35–7.45)
PHOSPHATE SERPL-MCNC: 2.9 MG/DL
PO2 BLDA: 83 MMHG (ref 80–100)
POC BE: -1 MMOL/L
POC SATURATED O2: 94 % (ref 95–100)
POC TCO2: 27 MMOL/L (ref 23–27)
POCT GLUCOSE: 147 MG/DL (ref 70–110)
POCT GLUCOSE: 49 MG/DL (ref 70–110)
POCT GLUCOSE: 88 MG/DL (ref 70–110)
POCT GLUCOSE: 97 MG/DL (ref 70–110)
POTASSIUM SERPL-SCNC: 4.8 MMOL/L
SAMPLE: ABNORMAL
SITE: ABNORMAL
SODIUM SERPL-SCNC: 151 MMOL/L
SP02: 98

## 2019-02-01 PROCEDURE — 27000221 HC OXYGEN, UP TO 24 HOURS

## 2019-02-01 PROCEDURE — 99233 SBSQ HOSP IP/OBS HIGH 50: CPT | Mod: ,,, | Performed by: NURSE PRACTITIONER

## 2019-02-01 PROCEDURE — 21400001 HC TELEMETRY ROOM

## 2019-02-01 PROCEDURE — 80069 RENAL FUNCTION PANEL: CPT

## 2019-02-01 PROCEDURE — 99233 PR SUBSEQUENT HOSPITAL CARE,LEVL III: ICD-10-PCS | Mod: ,,, | Performed by: NURSE PRACTITIONER

## 2019-02-01 PROCEDURE — 25000242 PHARM REV CODE 250 ALT 637 W/ HCPCS: Performed by: INTERNAL MEDICINE

## 2019-02-01 PROCEDURE — 63600175 PHARM REV CODE 636 W HCPCS: Performed by: INTERNAL MEDICINE

## 2019-02-01 PROCEDURE — 25000003 PHARM REV CODE 250: Performed by: INTERNAL MEDICINE

## 2019-02-01 PROCEDURE — 94660 CPAP INITIATION&MGMT: CPT

## 2019-02-01 PROCEDURE — 94761 N-INVAS EAR/PLS OXIMETRY MLT: CPT

## 2019-02-01 PROCEDURE — 36415 COLL VENOUS BLD VENIPUNCTURE: CPT

## 2019-02-01 PROCEDURE — 94640 AIRWAY INHALATION TREATMENT: CPT

## 2019-02-01 PROCEDURE — S0028 INJECTION, FAMOTIDINE, 20 MG: HCPCS | Performed by: INTERNAL MEDICINE

## 2019-02-01 PROCEDURE — 99900035 HC TECH TIME PER 15 MIN (STAT)

## 2019-02-01 PROCEDURE — 82803 BLOOD GASES ANY COMBINATION: CPT

## 2019-02-01 PROCEDURE — 36600 WITHDRAWAL OF ARTERIAL BLOOD: CPT

## 2019-02-01 RX ORDER — SODIUM CHLORIDE 450 MG/100ML
INJECTION, SOLUTION INTRAVENOUS CONTINUOUS
Status: DISCONTINUED | OUTPATIENT
Start: 2019-02-01 | End: 2019-02-02

## 2019-02-01 RX ORDER — MORPHINE SULFATE 1 MG/ML
2 INJECTION, SOLUTION EPIDURAL; INTRATHECAL; INTRAVENOUS ONCE
Status: DISCONTINUED | OUTPATIENT
Start: 2019-02-01 | End: 2019-02-01 | Stop reason: CLARIF

## 2019-02-01 RX ORDER — HALOPERIDOL 5 MG/ML
2 INJECTION INTRAMUSCULAR EVERY 6 HOURS PRN
Status: DISCONTINUED | OUTPATIENT
Start: 2019-02-01 | End: 2019-02-05

## 2019-02-01 RX ORDER — MORPHINE SULFATE 10 MG/ML
2 INJECTION INTRAMUSCULAR; INTRAVENOUS; SUBCUTANEOUS ONCE
Status: COMPLETED | OUTPATIENT
Start: 2019-02-01 | End: 2019-02-01

## 2019-02-01 RX ADMIN — IPRATROPIUM BROMIDE AND ALBUTEROL SULFATE 3 ML: .5; 3 SOLUTION RESPIRATORY (INHALATION) at 07:02

## 2019-02-01 RX ADMIN — LORAZEPAM 2 MG: 2 INJECTION, SOLUTION INTRAMUSCULAR; INTRAVENOUS at 09:02

## 2019-02-01 RX ADMIN — HEPARIN SODIUM 5000 UNITS: 5000 INJECTION, SOLUTION INTRAVENOUS; SUBCUTANEOUS at 08:02

## 2019-02-01 RX ADMIN — SODIUM CHLORIDE: 0.45 INJECTION, SOLUTION INTRAVENOUS at 11:02

## 2019-02-01 RX ADMIN — FAMOTIDINE 20 MG: 10 INJECTION INTRAVENOUS at 08:02

## 2019-02-01 RX ADMIN — DEXTROSE MONOHYDRATE 12.5 G: 500 INJECTION PARENTERAL at 09:02

## 2019-02-01 RX ADMIN — IPRATROPIUM BROMIDE AND ALBUTEROL SULFATE 3 ML: .5; 3 SOLUTION RESPIRATORY (INHALATION) at 03:02

## 2019-02-01 RX ADMIN — IPRATROPIUM BROMIDE AND ALBUTEROL SULFATE 3 ML: .5; 3 SOLUTION RESPIRATORY (INHALATION) at 12:02

## 2019-02-01 RX ADMIN — MORPHINE SULFATE 2 MG: 10 INJECTION INTRAVENOUS at 08:02

## 2019-02-01 RX ADMIN — HEPARIN SODIUM 5000 UNITS: 5000 INJECTION, SOLUTION INTRAVENOUS; SUBCUTANEOUS at 09:02

## 2019-02-01 NOTE — PROGRESS NOTES
[facility] Hospital Medicine  Progress Note    Patient Name: Zakiya Garcia  MRN: [id number]  Patient Class: IP- Inpatient   Admission Date: 1/23/2019  Length of Stay: 9 days  Attending Physician: Jay Resendiz MD  Primary Care Provider: Ines Barragan MD        Subjective:     Principal Problem:Acute respiratory acidosis    HPI:  69 year old female with hypertension, dementia, s/p dorota BKA, hyperlipidemia, smoker and dementia who presented with shortness of breath. Per EMS, patient's sats were 90% at room air then 100% after breathing treatment and a dose of solumedrol 125 mg IM. Patient's son reported gradual worsening of non productive cough as well as emesis after coughing this AM. Chest pain when coughing. Patient was last seen in the ED on 1/14 (~10 days PTA) for a UTI. Was prescribed bactrim BID x 7 days. Patient is not sure if still taking metformin or lisinopril. Son will bring list of meds.     In the ED, patient was tachycardic, with low grade fever, with tachypnea and sat 92% at room air. Lungs clear to auscultation and XRay without evidence of consolidation or edema. Labs showed moderate hyperkalemia, acute renal failure and elevated lactic acid. ABG showed pH of 7.0 with hypercapnia. Patient admitted to ICU for close monitoring given such significant metabolic derangements.     Hospital Course:  Ms Garcia presented with shortness of breath and decreased appetite. Workup in the ED significant for moderate hyperkalemia (6.4), acute renal failure, elevated lactic acid (2.3) and acute respiratory acidosis (pH of 7, pCO2 of 74.4). Patient was surprisingly very alert and conversational, but lab results very concerning. ABG without significant change despite BiPAP. Admitted to ICU for close observation. Pulmonology and nephrology consulted. Respiratory acidosis persisted despite multiple attempts on BiPAP. Degree of metabolic acidosis is not severe enough to be main cause. Furosemide  and bicarb did not help much either. Further workup revealed elevated trop of 2 (0.08 in the ED) and LVEF of 25% with global hypokinesis with inferoposterior WMA. This is new. May also have undiagnosed emphysema. Hyperkalemia resolved with shifting and by using kayexalate. Patient clinically improved although not back to baseline. Short course of steroids added to duo-nebs for wheezing. Appetite is poor. Patient was strongly advised to quit smoking (smokes continuously all day, per son), taking so much goody powder and stop carbonated drinks (drinks coke all day). Patient does not appear to be motivated to quit any. Stepped down to floor on 1/27.  PT/OT consulted. Neurology consulted on 1/28 for AMS- thought to be metabolic encephalopathy. Palliative care was consulted on 1/30/19 as patient was not eating and prognosis over-all was poor.  The patient became bi-pap dependent. The patient seemed to have a general decline and on the morning of 2/1/19, I attempted to call both sons to discuss code status.  Palliative care has been asking the family to come to an agreement with a recommendation of DNR.  Both sons I called did not answer.      Interval History: Patient continues on Bi-pap    Review of Systems   Unable to perform ROS: Acuity of condition     Objective:     Vital Signs (Most Recent):  Temp: 98 °F (36.7 °C) (02/01/19 0708)  Pulse: (!) 111 (02/01/19 0728)  Resp: (!) 22 (02/01/19 0728)  BP: (!) 170/79 (02/01/19 0708)  SpO2: 100 % (02/01/19 0728) Vital Signs (24h Range):  Temp:  [97.4 °F (36.3 °C)-98 °F (36.7 °C)] 98 °F (36.7 °C)  Pulse:  [] 111  Resp:  [16-22] 22  SpO2:  [99 %-100 %] 100 %  BP: (124-177)/(75-81) 170/79     Weight: 44.7 kg (98 lb 8.7 oz)  Body mass index is 18.02 kg/m².  No intake or output data in the 24 hours ending 02/01/19 0849   Physical Exam   Constitutional: She appears well-developed and well-nourished.   HENT:   Head: Normocephalic and atraumatic.   Cardiovascular: Normal rate  and regular rhythm.   Pulmonary/Chest: No respiratory distress. She has no wheezes.   Neurological: She is alert.   Skin: Skin is warm.   Psychiatric: She has a normal mood and affect.   Vitals reviewed.      Significant Labs:   BMP:   Recent Labs   Lab 02/01/19  0443   GLU 74   *   K 4.8   *   CO2 28   BUN 44*   CREATININE 1.6*   CALCIUM 8.8     CBC: No results for input(s): WBC, HGB, HCT, PLT in the last 48 hours.    Significant Imaging:     Assessment/Plan:      * Acute respiratory acidosis    Not much improved with BiPAP. Maybe very slight. Degree of metabolic acidosis cannot account for this  Workup thus far revealed new severely depressed LVEF to 25% with inferoposterior WMA  Trop initially mildly elevated to 0.089 now 2. Patient is chest pain free.   Unknown how long EF has been depressed but per son her SOB has been present for days. MI might not be acute  Has had a coronary stent placed 6 years ago, per son. On ASA daily but smokes cigarettes continuously and follows a high salt diet.   Currently not a good candidate for cardiac catheterization  Diuresed. BP at goal. Adequately diuresed and more alert although still with some resp acidosis  Took a lot of goody powder. Salicylate induced? Salicylate level is not elevated  Continue on low flow NC for now. Must quit smoking. Will test for home O2 prior to discharge  Cardiology and pulm on board for co-management- signed off.     Patient continuing on Bi-pap. Attempted to call 2 sons this am as would like answer on code status.  I do not think it is in the best interest of the patient to under go intubation. I also notified Palliative care this am. Given Morphine and checking ABG. For now she is stable but fragile.            Palliative care encounter    Discussed case this am. Family leaning towards DNR and hospice. Will meet with palliative care later.          Debility    PT/OT eval.      Delirium    Neurology consulted. Think may be metabolic  encephalopathy.  +/- EEG. CT negative.        Tobacco use disorder, severe, dependence    Smoking all day long  Spent > 10 minutes discussing tobacco cessation  Patient does not appear to be motivated. Not interested on nicotine patch       NSTEMI (non-ST elevated myocardial infarction)    As above       Acute combined systolic and diastolic heart failure    As above       Acute respiratory failure with hypoxia and hypercapnia    As above       Diabetes mellitus type 2 in nonobese    Hold metformin and start insulin  Adjust as needed for goal -180       Hypertension    Now better controlled  Restart carvedilol. Will add nifedipine if needed for goal SBP < 130       Acute renal failure    Unsure if true renal failure or side effect from bactrim. Son also reports taking a lot of goody powder  Hold off on metformin   Stopped fluids for pulm edema. Holding furosemide as I believe she was over diuresed (dry oral mucosa, better sats, elevated BUN). Renal function improved some with this. Nephrology on board for co-management   BMP in AM  Strict I/Os       Abnormal urinalysis    Abnormal urinalysis  UCx negative  Stop ceftriaxone       Hyperkalemia, diminished renal excretion    2/2 to bactrim vs renal failure vs both  Resolved     Added kayexalate          VTE Risk Mitigation (From admission, onward)        Ordered     heparin (porcine) injection 5,000 Units  Every 12 hours      01/29/19 0816     IP VTE HIGH RISK PATIENT  Once      01/23/19 1630          May transfer to ICU shortly if patient's family wishes her to be full code. Palliative care coming to eval patient as well. Stat ABG       Will check on in 30 minutes.     Addendum 10:02 am. Checked with RN. Stable for now. ABG reviewed- not bad at all.  Spoke to son. He is on way to hospital and hopefully will change to DNR.     Addendum 10:13am  Spoke with Palliative care- Patient now DNR.  Likely to home with hospice on 2/2/19      Jay Mccurdy,  MD  Department of Hospital Medicine   [facility]

## 2019-02-01 NOTE — NURSING
Report received from BENNY Bartlett. Patient resting quietly. NAD noted. Safety maintained. Will continue to monitor.

## 2019-02-01 NOTE — PROGRESS NOTES
"Patient seems agitated this am and bedside nurse reports patient has been this way since last night even after the Atvian. It was not until she had Morphine this am that she was able to calm down. Patient has some dementia and delirium so the Ativan could make agitation worse but her sodium is also high this am. I will discontinue the ativan and add Haldol PRN for agitation.   Called the sons and was able to get in touch with Dean who was on his way to the hospital. Once he arrived I met with him and his sister and they called their brother Arsenio on the phone. We discussed their mother's continued decline and the next step may be intubation and going to the ICU unless they (children can agree on GOC). They agree to DNR but want to continue treatment until the patient's grandson is able to get here tomorrow and see her before they make any decisions about withdrawal of care and focusing on comfort. He states " I don't want to take that mask off of her until her grandson is able to see her." I let him know we will continue to treat and make her more comfortable but no escalation of care should she start to decompensate.He agrees and states" we are all in agreement to do wha'ts best for her."  I let them also know there were lots of family here last night per staff and he would like visits to be limited to family only. Bedside nurse notified.  Dr Resendiz updated        -continue current treatment with no escalation of care  -family will make decision this weekend regarding comfort care  -patient is now a DNR (Dr Resendiz to put in order)  -My recommendation continues to be hospice  -LaPOST will be completed at discharge once we establish goals  -symptom management orders  -Palliative to continue to follow     > 50% of 35 min visit spent in chart review, face to face discussion of goals of care,  symptom assessment, coordination of care and emotional support.            "

## 2019-02-01 NOTE — ASSESSMENT & PLAN NOTE
Not much improved with BiPAP. Maybe very slight. Degree of metabolic acidosis cannot account for this  Workup thus far revealed new severely depressed LVEF to 25% with inferoposterior WMA  Trop initially mildly elevated to 0.089 now 2. Patient is chest pain free.   Unknown how long EF has been depressed but per son her SOB has been present for days. MI might not be acute  Has had a coronary stent placed 6 years ago, per son. On ASA daily but smokes cigarettes continuously and follows a high salt diet.   Currently not a good candidate for cardiac catheterization  Diuresed. BP at goal. Adequately diuresed and more alert although still with some resp acidosis  Took a lot of goody powder. Salicylate induced? Salicylate level is not elevated  Continue on low flow NC for now. Must quit smoking. Will test for home O2 prior to discharge  Cardiology and pulm on board for co-management- signed off.     Patient continuing on Bi-pap. Attempted to call 2 sons this am as would like answer on code status.  I do not think it is in the best interest of the patient to under go intubation. I also notified Palliative care this am. Given Morphine and checking ABG. For now she is stable but fragile.

## 2019-02-01 NOTE — PT/OT/SLP PROGRESS
Speech Language Pathology      Zakiya Garcia  MRN: [id number]  W303/W303 A    Patient not seen today secondary to Other (Comment)(Pt remains on BIPAP and somnolent). Patient has not been appropriate for ST services since 1/25/2018. Discussed with Dr. Resendiz recommendations to discharge from ST services until patient is medically appropriate. MD in agreement. Therapy orders discontinued. Please re-consult as needed.     ALVARO Beth, CCC-SLP   02/01/2019

## 2019-02-01 NOTE — PHYSICIAN QUERY
PT Name: Zakiya Garcia  MR #: [id number]    Physician Query Form - Consultant Diagnosis Clarification     CDS/: Aleta Dietrich               Contact information:[email or web address]  This form is a permanent document in the medical record.     Query Date: February 1, 2019      By submitting this query, we are merely seeking further clarification of documentation.  Please utilize your independent clinical judgment when addressing the question(s) below.      The Medical record contains the following:   Diagnosis Supporting Clinical Information Location in Medical Record   Severe malnutrition    Signs and Symptoms (as evidenced by):   Energy Intake: <50% of estimated energy requirement for >/=5 days   Weight Loss: 15% < 1 month     Subcutaneous Fat Loss (Final Summary): severe protein-calorie malnutrition   Muscle Loss Evaluation (Final Summary): severe protein-calorie malnutrition     % Intake of Estimated Energy Needs: 0 - 25 %   % Meal Intake: 0 - 25 %     BMI (Calculated): 18.6   BMI Grade: 18.5-24.9 - normal   Weight Loss: unintentional   Weight Change Amount: 18 lb 4.8 oz     1. Encourage adequate meal & oral nutr supplement intake as respiratory status allows given pt's bipap needs at times.   2. If meal intake remains inadequate over the next 48 hrs, RD to provide nutr support recs if this is consistent w/ POC    Nutrition PN 1/30         Do you agree with the Consultants diagnosis of ____Severe malnutrition____?    [  x Yes   [  ] No   [  ] Clinically insignificant   [  ] Other/Clarification of findings:   [  ] Clinically undetermined

## 2019-02-01 NOTE — PLAN OF CARE
Problem: Fall Injury Risk  Goal: Absence of Fall and Fall-Related Injury    Intervention: Identify and Manage Contributors to Fall Injury Risk   01/29/19 0620   Manage Acute Allergic Reaction   Medication Review/Management medications reviewed   Identify and Manage Contributors to Fall Injury Risk   Self-Care Promotion independence encouraged;BADL personal objects within reach;BADL personal routines maintained         Problem: Adult Inpatient Plan of Care  Goal: Plan of Care Review   01/29/19 0620   Plan of Care Review   Plan of Care Reviewed With patient       Problem: Diabetes Comorbidity  Goal: Blood Glucose Level Within Desired Range    Intervention: Maintain Glycemic Control   02/01/19 0620   Monitor and Manage Ketoacidosis   Glycemic Management blood glucose monitoring         Problem: Coping Ineffective  Goal: Effective Coping    Intervention: Support and Enhance Coping Strategies   01/31/19 0431   Monitor/Manage Chemotherapy Gastrointestinal Effects   Environmental Support calm environment promoted;environmental consistency promoted   Promote Anxiety Reduction   Family/Support System Care presence promoted   Supportive Measures active listening utilized;verbalization of feelings encouraged

## 2019-02-01 NOTE — PROGRESS NOTES
Spoke with Dr. Resendiz regarding pt's Mews score and pts current status.  Pt is very restless this am.  New orders given and advised he would assess pt.

## 2019-02-01 NOTE — PROGRESS NOTES
"[facility]  Adult Nutrition  Progress Note    SUMMARY       Recommendations    1. Advance diet as able per SLP/respiratory status  2. RD to f/u with goals of care     Goals: 1. >50% meal intake   Nutrition Goal Status: goal not met  Communication of RD Recs: reviewed with RN    Reason for Assessment    Reason For Assessment: RD follow-up  Diagnosis: other (see comments)(acute respiratory acidosis 2/2 to heart failure)  Relevant Medical History: bilateral BKA, dementia, DM, HTN, current smoker, anemia of chronic disease  Interdisciplinary Rounds: did not attend    General Information Comments: Pt on Bipap at time of visit, unable to remove mask; no escalation of care; possible comfort care/wiithdrawal of care to be decided when all family available. SLP unable to assess at this time. Pt with 0% intake. NFPE today: Upper body adequate in muscle and fat mass, lower extremities with loss of muscle mass, expected with limited mobility and bilateral AKA.     Nutrition Discharge Planning: unable to determine at this time    Nutrition Risk Screen    Nutrition Risk Screen: dysphagia or difficulty swallowing    Nutrition/Diet History    Spiritual, Cultural Beliefs, Judaism Practices, Values that Affect Care: no  Food Allergies: NKFA  Factors Affecting Nutritional Intake: impaired cognitive status/motor control, other (see comments)(significant lethargy)    Anthropometrics    Temp: 98 °F (36.7 °C)  Height Method: Stated  Height: 5' 2" (157.5 cm)  Height (inches): 62 in  Weight Method: Bed Scale  Weight: 44.7 kg (98 lb 8.7 oz)  Weight (lb): 98.55 lb  Ideal Body Weight (IBW), Female: 110 lb  % Ideal Body Weight, Female (lb): 92.39 lb  BMI (Calculated): 18.6  BMI Grade: 18.5-24.9 - normal  Weight Loss: unintentional  Weight Change Amount: 18 lb 4.8 oz  Amputation %: 5.9, 5.9  Total Amputation %: 11.8  Amputation Ideal Body Weight (IBW), Female (lb): 98.2 lb  Amputee BMI (kg/m2): 21.13 kg/m2   "     Lab/Procedures/Meds    Pertinent Labs Reviewed: reviewed  Pertinent Labs Comments: Na 151; ; pH 7.26  Pertinent Medications Reviewed: reviewed  Pertinent Medications Comments: prednisone    Estimated/Assessed Needs    Weight Used For Calorie Calculations: 46.1 kg (101 lb 10.1 oz)  Energy Calorie Requirements (kcal): 8169-2619(25-30 kcal/kg)  Energy Need Method: Kcal/kg  Protein Requirements: 55-65g (1.2-1.4g/kg)  Weight Used For Protein Calculations: 46.1 kg (101 lb 10.1 oz)     Estimated Fluid Requirement Method: RDA Method  RDA Method (mL): 1153  CHO Requirement: 144-172g      Nutrition Prescription Ordered    Current Diet Order: Dysphagia Pureed Renal, Cardiac  Nutrition Order Comments: Boost glucose chocolate    Evaluation of Received Nutrient/Fluid Intake    I/O: reviewed  Energy Calories Required: not meeting needs  Protein Required: not meeting needs  Fluid Required: (as per MD)  Comments: LBM 2/1  % Intake of Estimated Energy Needs: 0 - 25 %  % Meal Intake: 0 - 25 %    Nutrition Risk    Level of Risk/Frequency of Follow-up: (2x/wk)     Assessment and Plan    Severe malnutrition    Malnutrition in the context of Acute Illness/Injury    Related to (etiology):  PMH; acute respiratory failure    Signs and Symptoms (as evidenced by):  Energy Intake: <50% of estimated energy requirement for >/=5 days  Weight Loss: 15% < 1 month    Interventions/Recommendations (treatment strategy):  Commercial beverage    Nutrition Diagnosis Status:  Continues              Monitor and Evaluation    Food and Nutrient Intake: energy intake, food and beverage intake  Food and Nutrient Adminstration: diet order  Knowledge/Beliefs/Attitudes: food and nutrition knowledge/skill, beliefs and attitudes  Physical Activity and Function: nutrition-related ADLs and IADLs  Anthropometric Measurements: weight, weight change, body mass index  Biochemical Data, Medical Tests and Procedures: electrolyte and renal panel, glucose/endocrine  profile  Nutrition-Focused Physical Findings: overall appearance     Malnutrition Assessment  Malnutrition Type: acute illness or injury          Weight Loss (Malnutrition): greater than 5% in 1 month  Energy Intake (Malnutrition): less than or equal to 50% for greater than or equal to 5 days   Orbital Region (Subcutaneous Fat Loss): well nourished  Upper Arm Region (Subcutaneous Fat Loss): well nourished  Thoracic and Lumbar Region: well nourished   Nondenominational Region (Muscle Loss): mild depletion  Clavicle Bone Region (Muscle Loss): mild depletion  Clavicle and Acromion Bone Region (Muscle Loss): well nourished  Scapular Bone Region (Muscle Loss): well nourished  Dorsal Hand (Muscle Loss): well nourished  Anterior Thigh Region (Muscle Loss): moderate depletion  Posterior Calf Region (Muscle Loss): (BKA)       Subcutaneous Fat Loss (Final Summary): severe protein-calorie malnutrition  Muscle Loss Evaluation (Final Summary): severe protein-calorie malnutrition         Nutrition Follow-Up    RD Follow-up?: Yes

## 2019-02-01 NOTE — PHYSICIAN QUERY
PT Name: Zakiya Garcia  MR #: [id number]     Physician Query Form - Diagnosis Clarification      CDS/: Aleta Dietrich               Contact information:[email or web address]  This form is a permanent document in the medical record.     Query Date: February 1, 2019    By submitting this query, we are merely seeking further clarification of documentation.  Please utilize your independent clinical judgment when addressing the question(s) below.     The medical record contains the following:      Findings Supporting Clinical Information Location in Medical Record   Urinary tract infection    Urinary tract infection  Abnormal urinalysis   UCx pending   Agree with empiric ceftriaxone       Abnormal urinalysis   Abnormal urinalysis   UCx negative   Stop ceftriaxone    H&P 1/23            HM PN 1/27     Please clarify if the Urinary tract infection diagnosis has been:    [  ] Ruled In   [  ] Ruled In, Now Resolved   [ x ] Ruled Out   [  ] Other/Clarification of findings (please specify):   [  ] Clinically insignificant     [  ] Clinically undetermined     Please document in your progress notes daily for the duration of treatment, until resolved, and include in your discharge summary.

## 2019-02-01 NOTE — SUBJECTIVE & OBJECTIVE
Interval History: Patient continues on Bi-pap    Review of Systems   Unable to perform ROS: Acuity of condition     Objective:     Vital Signs (Most Recent):  Temp: 98 °F (36.7 °C) (02/01/19 0708)  Pulse: (!) 111 (02/01/19 0728)  Resp: (!) 22 (02/01/19 0728)  BP: (!) 170/79 (02/01/19 0708)  SpO2: 100 % (02/01/19 0728) Vital Signs (24h Range):  Temp:  [97.4 °F (36.3 °C)-98 °F (36.7 °C)] 98 °F (36.7 °C)  Pulse:  [] 111  Resp:  [16-22] 22  SpO2:  [99 %-100 %] 100 %  BP: (124-177)/(75-81) 170/79     Weight: 44.7 kg (98 lb 8.7 oz)  Body mass index is 18.02 kg/m².  No intake or output data in the 24 hours ending 02/01/19 0849   Physical Exam   Constitutional: She appears well-developed and well-nourished.   HENT:   Head: Normocephalic and atraumatic.   Cardiovascular: Normal rate and regular rhythm.   Pulmonary/Chest: No respiratory distress. She has no wheezes.   Neurological: She is alert.   Skin: Skin is warm.   Psychiatric: She has a normal mood and affect.   Vitals reviewed.      Significant Labs:   BMP:   Recent Labs   Lab 02/01/19  0443   GLU 74   *   K 4.8   *   CO2 28   BUN 44*   CREATININE 1.6*   CALCIUM 8.8     CBC: No results for input(s): WBC, HGB, HCT, PLT in the last 48 hours.    Significant Imaging:

## 2019-02-02 LAB
ALBUMIN SERPL BCP-MCNC: 3.1 G/DL
ANION GAP SERPL CALC-SCNC: 8 MMOL/L
BUN SERPL-MCNC: 40 MG/DL
CALCIUM SERPL-MCNC: 8.8 MG/DL
CHLORIDE SERPL-SCNC: 115 MMOL/L
CO2 SERPL-SCNC: 26 MMOL/L
CREAT SERPL-MCNC: 1.6 MG/DL
EST. GFR  (AFRICAN AMERICAN): 37 ML/MIN/1.73 M^2
EST. GFR  (NON AFRICAN AMERICAN): 32 ML/MIN/1.73 M^2
GLUCOSE SERPL-MCNC: 85 MG/DL
PHOSPHATE SERPL-MCNC: 2.2 MG/DL
POCT GLUCOSE: 101 MG/DL (ref 70–110)
POCT GLUCOSE: 135 MG/DL (ref 70–110)
POCT GLUCOSE: 207 MG/DL (ref 70–110)
POCT GLUCOSE: 64 MG/DL (ref 70–110)
POCT GLUCOSE: 76 MG/DL (ref 70–110)
POTASSIUM SERPL-SCNC: 4.8 MMOL/L
SODIUM SERPL-SCNC: 149 MMOL/L

## 2019-02-02 PROCEDURE — 21400001 HC TELEMETRY ROOM

## 2019-02-02 PROCEDURE — 25000003 PHARM REV CODE 250: Performed by: INTERNAL MEDICINE

## 2019-02-02 PROCEDURE — 94660 CPAP INITIATION&MGMT: CPT

## 2019-02-02 PROCEDURE — 99900035 HC TECH TIME PER 15 MIN (STAT)

## 2019-02-02 PROCEDURE — 63600175 PHARM REV CODE 636 W HCPCS: Performed by: NURSE PRACTITIONER

## 2019-02-02 PROCEDURE — S5010 5% DEXTROSE AND 0.45% SALINE: HCPCS | Performed by: INTERNAL MEDICINE

## 2019-02-02 PROCEDURE — 94640 AIRWAY INHALATION TREATMENT: CPT

## 2019-02-02 PROCEDURE — S0028 INJECTION, FAMOTIDINE, 20 MG: HCPCS | Performed by: INTERNAL MEDICINE

## 2019-02-02 PROCEDURE — 25000242 PHARM REV CODE 250 ALT 637 W/ HCPCS: Performed by: INTERNAL MEDICINE

## 2019-02-02 PROCEDURE — 80069 RENAL FUNCTION PANEL: CPT

## 2019-02-02 PROCEDURE — 63600175 PHARM REV CODE 636 W HCPCS: Performed by: INTERNAL MEDICINE

## 2019-02-02 PROCEDURE — 36415 COLL VENOUS BLD VENIPUNCTURE: CPT

## 2019-02-02 PROCEDURE — 94760 N-INVAS EAR/PLS OXIMETRY 1: CPT

## 2019-02-02 RX ORDER — DEXTROSE MONOHYDRATE AND SODIUM CHLORIDE 5; .45 G/100ML; G/100ML
INJECTION, SOLUTION INTRAVENOUS CONTINUOUS
Status: DISCONTINUED | OUTPATIENT
Start: 2019-02-02 | End: 2019-02-05 | Stop reason: HOSPADM

## 2019-02-02 RX ADMIN — HEPARIN SODIUM 5000 UNITS: 5000 INJECTION, SOLUTION INTRAVENOUS; SUBCUTANEOUS at 09:02

## 2019-02-02 RX ADMIN — SODIUM CHLORIDE: 0.45 INJECTION, SOLUTION INTRAVENOUS at 05:02

## 2019-02-02 RX ADMIN — IPRATROPIUM BROMIDE AND ALBUTEROL SULFATE 3 ML: .5; 3 SOLUTION RESPIRATORY (INHALATION) at 03:02

## 2019-02-02 RX ADMIN — HALOPERIDOL LACTATE 2 MG: 5 INJECTION, SOLUTION INTRAMUSCULAR at 10:02

## 2019-02-02 RX ADMIN — HALOPERIDOL LACTATE 2 MG: 5 INJECTION, SOLUTION INTRAMUSCULAR at 05:02

## 2019-02-02 RX ADMIN — DEXTROSE AND SODIUM CHLORIDE: 5; .45 INJECTION, SOLUTION INTRAVENOUS at 10:02

## 2019-02-02 RX ADMIN — IPRATROPIUM BROMIDE AND ALBUTEROL SULFATE 3 ML: .5; 3 SOLUTION RESPIRATORY (INHALATION) at 11:02

## 2019-02-02 RX ADMIN — DEXTROSE AND SODIUM CHLORIDE: 5; .45 INJECTION, SOLUTION INTRAVENOUS at 11:02

## 2019-02-02 RX ADMIN — IPRATROPIUM BROMIDE AND ALBUTEROL SULFATE 3 ML: .5; 3 SOLUTION RESPIRATORY (INHALATION) at 07:02

## 2019-02-02 RX ADMIN — HALOPERIDOL LACTATE 2 MG: 5 INJECTION, SOLUTION INTRAMUSCULAR at 01:02

## 2019-02-02 RX ADMIN — HYDRALAZINE HYDROCHLORIDE 10 MG: 20 INJECTION INTRAMUSCULAR; INTRAVENOUS at 12:02

## 2019-02-02 RX ADMIN — FAMOTIDINE 20 MG: 10 INJECTION INTRAVENOUS at 09:02

## 2019-02-02 NOTE — PLAN OF CARE
Problem: Diabetes Comorbidity  Goal: Blood Glucose Level Within Desired Range  Outcome: Ongoing (interventions implemented as appropriate)  Intervention: Maintain Glycemic Control   02/02/19 0512   Monitor and Manage Ketoacidosis   Glycemic Management blood glucose monitoring

## 2019-02-02 NOTE — SUBJECTIVE & OBJECTIVE
Interval History: comfortable     Review of Systems   Unable to perform ROS: Acuity of condition     Objective:     Vital Signs (Most Recent):  Temp: 98.5 °F (36.9 °C) (02/02/19 0730)  Pulse: 98 (02/02/19 0730)  Resp: 18 (02/02/19 0730)  BP: (!) 141/65 (02/02/19 0730)  SpO2: 99 % (02/02/19 0730) Vital Signs (24h Range):  Temp:  [96.1 °F (35.6 °C)-98.5 °F (36.9 °C)] 98.5 °F (36.9 °C)  Pulse:  [] 98  Resp:  [16-21] 18  SpO2:  [95 %-100 %] 99 %  BP: (137-189)/(65-79) 141/65     Weight: 44.7 kg (98 lb 8.7 oz)  Body mass index is 18.02 kg/m².  No intake or output data in the 24 hours ending 02/02/19 0736   Physical Exam   Constitutional: She appears well-developed and well-nourished.   HENT:   Head: Normocephalic and atraumatic.   Cardiovascular: Normal rate and regular rhythm.   Pulmonary/Chest: No respiratory distress. She has no wheezes.   Neurological: She is alert.   Skin: Skin is warm.   Psychiatric: She has a normal mood and affect.   Vitals reviewed.      Significant Labs:   CBC: No results for input(s): WBC, HGB, HCT, PLT in the last 48 hours.  CMP:   Recent Labs   Lab 02/01/19  0443   *   K 4.8   *   CO2 28   GLU 74   BUN 44*   CREATININE 1.6*   CALCIUM 8.8   ALBUMIN 3.0*   ANIONGAP 9   EGFRNONAA 32*       Significant Imaging:

## 2019-02-02 NOTE — NURSING
0122 Episode of Sinus tachycardia up to 130. Pt resting comfortably, no s/s of distress.    0402  Episode of PSVT, . When to pts rm, pt awake, no s/s of distress noted. /65 hr 102 SpO2 95, RR 19 Temp 98.4. Will continue to monitor.

## 2019-02-02 NOTE — PROGRESS NOTES
[facility] Hospital Medicine  Progress Note    Patient Name: Zakiya Garcia  MRN: [id number]  Patient Class: IP- Inpatient   Admission Date: 1/23/2019  Length of Stay: 10 days  Attending Physician: Jay Resendiz MD  Primary Care Provider: Ines Barragan MD        Subjective:     Principal Problem:Acute respiratory acidosis    HPI:  69 year old female with hypertension, dementia, s/p dorota BKA, hyperlipidemia, smoker and dementia who presented with shortness of breath. Per EMS, patient's sats were 90% at room air then 100% after breathing treatment and a dose of solumedrol 125 mg IM. Patient's son reported gradual worsening of non productive cough as well as emesis after coughing this AM. Chest pain when coughing. Patient was last seen in the ED on 1/14 (~10 days PTA) for a UTI. Was prescribed bactrim BID x 7 days. Patient is not sure if still taking metformin or lisinopril. Son will bring list of meds.     In the ED, patient was tachycardic, with low grade fever, with tachypnea and sat 92% at room air. Lungs clear to auscultation and XRay without evidence of consolidation or edema. Labs showed moderate hyperkalemia, acute renal failure and elevated lactic acid. ABG showed pH of 7.0 with hypercapnia. Patient admitted to ICU for close monitoring given such significant metabolic derangements.     Hospital Course:  Ms Garcia presented with shortness of breath and decreased appetite. Workup in the ED significant for moderate hyperkalemia (6.4), acute renal failure, elevated lactic acid (2.3) and acute respiratory acidosis (pH of 7, pCO2 of 74.4). Patient was surprisingly very alert and conversational, but lab results very concerning. ABG without significant change despite BiPAP. Admitted to ICU for close observation. Pulmonology and nephrology consulted. Respiratory acidosis persisted despite multiple attempts on BiPAP. Degree of metabolic acidosis is not severe enough to be main cause. Furosemide  and bicarb did not help much either. Further workup revealed elevated trop of 2 (0.08 in the ED) and LVEF of 25% with global hypokinesis with inferoposterior WMA. This is new. May also have undiagnosed emphysema. Hyperkalemia resolved with shifting and by using kayexalate. Patient clinically improved although not back to baseline. Short course of steroids added to duo-nebs for wheezing. Appetite is poor. Patient was strongly advised to quit smoking (smokes continuously all day, per son), taking so much goody powder and stop carbonated drinks (drinks coke all day). Patient does not appear to be motivated to quit any. Stepped down to floor on 1/27.  PT/OT consulted. Neurology consulted on 1/28 for AMS- thought to be metabolic encephalopathy. Palliative care was consulted on 1/30/19 as patient was not eating and prognosis over-all was poor.  The patient became bi-pap dependent. The patient seemed to have a general decline and on the morning of 2/1/19, I attempted to call both sons to discuss code status.  Palliative care has been asking the family to come to an agreement with a recommendation of DNR.  Family agreed to DNR on 2/1/19.     Interval History: comfortable     Review of Systems   Unable to perform ROS: Acuity of condition     Objective:     Vital Signs (Most Recent):  Temp: 98.5 °F (36.9 °C) (02/02/19 0730)  Pulse: 98 (02/02/19 0730)  Resp: 18 (02/02/19 0730)  BP: (!) 141/65 (02/02/19 0730)  SpO2: 99 % (02/02/19 0730) Vital Signs (24h Range):  Temp:  [96.1 °F (35.6 °C)-98.5 °F (36.9 °C)] 98.5 °F (36.9 °C)  Pulse:  [] 98  Resp:  [16-21] 18  SpO2:  [95 %-100 %] 99 %  BP: (137-189)/(65-79) 141/65     Weight: 44.7 kg (98 lb 8.7 oz)  Body mass index is 18.02 kg/m².  No intake or output data in the 24 hours ending 02/02/19 0736   Physical Exam   Constitutional: She appears well-developed and well-nourished.   HENT:   Head: Normocephalic and atraumatic.   Cardiovascular: Normal rate and regular rhythm.    Pulmonary/Chest: No respiratory distress. She has no wheezes.   Neurological: She is alert.   Skin: Skin is warm.   Psychiatric: She has a normal mood and affect.   Vitals reviewed.      Significant Labs:   CBC: No results for input(s): WBC, HGB, HCT, PLT in the last 48 hours.  CMP:   Recent Labs   Lab 02/01/19  0443   *   K 4.8   *   CO2 28   GLU 74   BUN 44*   CREATININE 1.6*   CALCIUM 8.8   ALBUMIN 3.0*   ANIONGAP 9   EGFRNONAA 32*       Significant Imaging:     Assessment/Plan:      * Acute respiratory acidosis    Not much improved with BiPAP. Maybe very slight. Degree of metabolic acidosis cannot account for this  Workup thus far revealed new severely depressed LVEF to 25% with inferoposterior WMA  Trop initially mildly elevated to 0.089 now 2. Patient is chest pain free.   Unknown how long EF has been depressed but per son her SOB has been present for days. MI might not be acute  Has had a coronary stent placed 6 years ago, per son. On ASA daily but smokes cigarettes continuously and follows a high salt diet.   Currently not a good candidate for cardiac catheterization  Diuresed. BP at goal. Adequately diuresed and more alert although still with some resp acidosis  Took a lot of goody powder. Salicylate induced? Salicylate level is not elevated  Continue on low flow NC for now. Must quit smoking. Will test for home O2 prior to discharge  Cardiology and pulm on board for co-management- signed off.     Patient continuing on Bi-pap. Attempted to call 2 sons this am as would like answer on code status.  I do not think it is in the best interest of the patient to under go intubation. I also notified Palliative care this am. Given Morphine and checking ABG. For now she is stable but fragile.     Bipap dependent now.  Comfortable. Morphine/ativan as needed.  Palliative care following. Now DNR            Palliative care encounter    Discussed case this am. Family leaning towards DNR and hospice. Will  meet with palliative care later.          Debility    PT/OT eval.      Delirium    Neurology consulted. Think may be metabolic encephalopathy.  +/- EEG. CT negative.        Tobacco use disorder, severe, dependence    Smoking all day long  Spent > 10 minutes discussing tobacco cessation  Patient does not appear to be motivated. Not interested on nicotine patch       NSTEMI (non-ST elevated myocardial infarction)    As above       Acute combined systolic and diastolic heart failure    As above       Acute respiratory failure with hypoxia and hypercapnia    As above       Diabetes mellitus type 2 in nonobese    Hold metformin and start insulin  Adjust as needed for goal -180       Hypertension    Now better controlled  Restart carvedilol. Will add nifedipine if needed for goal SBP < 130       Acute renal failure    Unsure if true renal failure or side effect from bactrim. Son also reports taking a lot of goody powder  Hold off on metformin   Stopped fluids for pulm edema. Holding furosemide as I believe she was over diuresed (dry oral mucosa, better sats, elevated BUN). Renal function improved some with this. Nephrology on board for co-management   BMP in AM  Strict I/Os       Abnormal urinalysis    Abnormal urinalysis  UCx negative  Stop ceftriaxone       Hyperkalemia, diminished renal excretion    2/2 to bactrim vs renal failure vs both  Resolved     Added kayexalate          VTE Risk Mitigation (From admission, onward)        Ordered     heparin (porcine) injection 5,000 Units  Every 12 hours      01/29/19 0816     IP VTE HIGH RISK PATIENT  Once      01/23/19 1630        Family will decide over weekend for comfort only/hospice. For now, will make sure patient is comfortable. No escalation of care. No further labs.       Jay Mccurdy MD  Department of Hospital Medicine   [facility]

## 2019-02-02 NOTE — ASSESSMENT & PLAN NOTE
Not much improved with BiPAP. Maybe very slight. Degree of metabolic acidosis cannot account for this  Workup thus far revealed new severely depressed LVEF to 25% with inferoposterior WMA  Trop initially mildly elevated to 0.089 now 2. Patient is chest pain free.   Unknown how long EF has been depressed but per son her SOB has been present for days. MI might not be acute  Has had a coronary stent placed 6 years ago, per son. On ASA daily but smokes cigarettes continuously and follows a high salt diet.   Currently not a good candidate for cardiac catheterization  Diuresed. BP at goal. Adequately diuresed and more alert although still with some resp acidosis  Took a lot of goody powder. Salicylate induced? Salicylate level is not elevated  Continue on low flow NC for now. Must quit smoking. Will test for home O2 prior to discharge  Cardiology and pulm on board for co-management- signed off.     Patient continuing on Bi-pap. Attempted to call 2 sons this am as would like answer on code status.  I do not think it is in the best interest of the patient to under go intubation. I also notified Palliative care this am. Given Morphine and checking ABG. For now she is stable but fragile.     Bipap dependent now.  Comfortable. Morphine/ativan as needed.  Palliative care following. Now DNR

## 2019-02-03 LAB
POCT GLUCOSE: 157 MG/DL (ref 70–110)
POCT GLUCOSE: 183 MG/DL (ref 70–110)
POCT GLUCOSE: 188 MG/DL (ref 70–110)
POCT GLUCOSE: 203 MG/DL (ref 70–110)

## 2019-02-03 PROCEDURE — S0028 INJECTION, FAMOTIDINE, 20 MG: HCPCS | Performed by: INTERNAL MEDICINE

## 2019-02-03 PROCEDURE — 99900035 HC TECH TIME PER 15 MIN (STAT)

## 2019-02-03 PROCEDURE — 94640 AIRWAY INHALATION TREATMENT: CPT

## 2019-02-03 PROCEDURE — 94760 N-INVAS EAR/PLS OXIMETRY 1: CPT

## 2019-02-03 PROCEDURE — 25000242 PHARM REV CODE 250 ALT 637 W/ HCPCS: Performed by: INTERNAL MEDICINE

## 2019-02-03 PROCEDURE — 25000003 PHARM REV CODE 250: Performed by: INTERNAL MEDICINE

## 2019-02-03 PROCEDURE — 21400001 HC TELEMETRY ROOM

## 2019-02-03 PROCEDURE — S5010 5% DEXTROSE AND 0.45% SALINE: HCPCS | Performed by: INTERNAL MEDICINE

## 2019-02-03 PROCEDURE — 63600175 PHARM REV CODE 636 W HCPCS: Performed by: INTERNAL MEDICINE

## 2019-02-03 PROCEDURE — 63600175 PHARM REV CODE 636 W HCPCS: Performed by: NURSE PRACTITIONER

## 2019-02-03 PROCEDURE — 94660 CPAP INITIATION&MGMT: CPT

## 2019-02-03 PROCEDURE — 27000221 HC OXYGEN, UP TO 24 HOURS

## 2019-02-03 RX ADMIN — FAMOTIDINE 20 MG: 10 INJECTION INTRAVENOUS at 09:02

## 2019-02-03 RX ADMIN — IPRATROPIUM BROMIDE AND ALBUTEROL SULFATE 3 ML: .5; 3 SOLUTION RESPIRATORY (INHALATION) at 03:02

## 2019-02-03 RX ADMIN — HEPARIN SODIUM 5000 UNITS: 5000 INJECTION, SOLUTION INTRAVENOUS; SUBCUTANEOUS at 09:02

## 2019-02-03 RX ADMIN — HALOPERIDOL LACTATE 2 MG: 5 INJECTION, SOLUTION INTRAMUSCULAR at 03:02

## 2019-02-03 RX ADMIN — HALOPERIDOL LACTATE 2 MG: 5 INJECTION, SOLUTION INTRAMUSCULAR at 01:02

## 2019-02-03 RX ADMIN — DEXTROSE AND SODIUM CHLORIDE: 5; .45 INJECTION, SOLUTION INTRAVENOUS at 03:02

## 2019-02-03 RX ADMIN — IPRATROPIUM BROMIDE AND ALBUTEROL SULFATE 3 ML: .5; 3 SOLUTION RESPIRATORY (INHALATION) at 07:02

## 2019-02-03 RX ADMIN — IPRATROPIUM BROMIDE AND ALBUTEROL SULFATE 3 ML: .5; 3 SOLUTION RESPIRATORY (INHALATION) at 11:02

## 2019-02-03 RX ADMIN — HEPARIN SODIUM 5000 UNITS: 5000 INJECTION, SOLUTION INTRAVENOUS; SUBCUTANEOUS at 10:02

## 2019-02-03 NOTE — PLAN OF CARE
Problem: Fall Injury Risk  Goal: Absence of Fall and Fall-Related Injury  Outcome: Ongoing (interventions implemented as appropriate)  Intervention: Identify and Manage Contributors to Fall Injury Risk   02/03/19 0701   Manage Acute Allergic Reaction   Medication Review/Management medications reviewed   Identify and Manage Contributors to Fall Injury Risk   Self-Care Promotion BADL personal objects within reach;safe use of adaptive equipment encouraged         Problem: Adult Inpatient Plan of Care  Goal: Plan of Care Review  Outcome: Ongoing (interventions implemented as appropriate)   02/03/19 1736   Plan of Care Review   Plan of Care Reviewed With patient;daughter;family;son       Problem: Skin Injury Risk Increased  Goal: Skin Health and Integrity  Outcome: Ongoing (interventions implemented as appropriate)  Intervention: Optimize Skin Protection   02/03/19 0701 02/03/19 1733   Prevent Additional Skin Injury   Head of Bed (HOB) --  HOB at 30-45 degrees   Pressure Reduction Devices pressure-redistributing mattress utilized --    Pressure Reduction Techniques frequent weight shift encouraged;sit time limited to 2 hours;weight shift assistance provided;rest period provided between sit times --    Monitor and Manage Hypervolemia   Skin Protection adhesive use limited;incontinence pads utilized;skin-to-skin areas padded;skin-to-device areas padded --          Problem: Infection  Goal: Infection Symptom Resolution  Outcome: Ongoing (interventions implemented as appropriate)  Intervention: Prevent or Manage Infection   01/29/19 1425 02/03/19 0701   Prevent or Manage Infection   Fever Reduction/Comfort Measures lightweight clothing;lightweight bedding --    Infection Management --  aseptic technique maintained

## 2019-02-03 NOTE — ASSESSMENT & PLAN NOTE
Not much improved with BiPAP. Maybe very slight. Degree of metabolic acidosis cannot account for this  Workup thus far revealed new severely depressed LVEF to 25% with inferoposterior WMA  Trop initially mildly elevated to 0.089 now 2. Patient is chest pain free.   Unknown how long EF has been depressed but per son her SOB has been present for days. MI might not be acute  Has had a coronary stent placed 6 years ago, per son. On ASA daily but smokes cigarettes continuously and follows a high salt diet.   Currently not a good candidate for cardiac catheterization  Diuresed. BP at goal. Adequately diuresed and more alert although still with some resp acidosis  Took a lot of goody powder. Salicylate induced? Salicylate level is not elevated  Continue on low flow NC for now. Must quit smoking. Will test for home O2 prior to discharge  Cardiology and pulm on board for co-management- signed off.     Patient continuing on Bi-pap. Attempted to call 2 sons this am as would like answer on code status.  I do not think it is in the best interest of the patient to under go intubation. I also notified Palliative care this am. Given Morphine and checking ABG. For now she is stable but fragile.     Bipap dependent now.  Comfortable. Morphine/ativan as needed.  Palliative care following. Now DNR   Continues in poor condition.  Likely home with hospice on 2/4/19.

## 2019-02-03 NOTE — NURSING
Patient appears to be much more awake, sitting in her bed. Taken off BIPAP for few second, wet her mouth with  Sponge. Clear thickenedfluids given, only taken very small amount and cough after wards. Will continue to monitor.

## 2019-02-03 NOTE — SUBJECTIVE & OBJECTIVE
Interval History: Comfortable     Review of Systems   Unable to perform ROS: Acuity of condition     Objective:     Vital Signs (Most Recent):  Temp: 98.7 °F (37.1 °C) (02/03/19 0738)  Pulse: 99 (02/03/19 0738)  Resp: 18 (02/03/19 0738)  BP: 125/64 (02/03/19 0738)  SpO2: 100 % (02/03/19 0738) Vital Signs (24h Range):  Temp:  [97.7 °F (36.5 °C)-98.9 °F (37.2 °C)] 98.7 °F (37.1 °C)  Pulse:  [] 99  Resp:  [16-26] 18  SpO2:  [98 %-100 %] 100 %  BP: (125-169)/(64-80) 125/64     Weight: 44.7 kg (98 lb 8.7 oz)  Body mass index is 18.02 kg/m².    Intake/Output Summary (Last 24 hours) at 2/3/2019 1036  Last data filed at 2/3/2019 0654  Gross per 24 hour   Intake 1526.25 ml   Output --   Net 1526.25 ml      Physical Exam   Constitutional: She appears well-developed and well-nourished.   HENT:   Head: Normocephalic and atraumatic.   Cardiovascular: Normal rate and regular rhythm.   Pulmonary/Chest: No respiratory distress. She has no wheezes.   Neurological: She is alert.   Skin: Skin is warm.   Psychiatric: She has a normal mood and affect.   Vitals reviewed.

## 2019-02-03 NOTE — PLAN OF CARE
Problem: Skin Injury Risk Increased  Goal: Skin Health and Integrity  Outcome: Ongoing (interventions implemented as appropriate)  Intervention: Optimize Skin Protection   02/03/19 0548   Prevent Additional Skin Injury   Head of Bed (HOB) HOB at 30-45 degrees   Pressure Reduction Devices pressure-redistributing mattress utilized;elbow protectors utilized   Pressure Reduction Techniques frequent weight shift encouraged;weight shift assistance provided

## 2019-02-03 NOTE — NURSING
Received report from BENNY Velasco. A little bit unsettled , deny pain.  BIPAP on.  IV line intact over right hand - on continuous IV fluids. Call bell given on her reach, instructed to call for assistance all the time (appears to be confuse and not herself). Bed alarm on. Bed on lowest position. Nursed near nurse station for better monitoring. Safety maintained.

## 2019-02-03 NOTE — PROGRESS NOTES
[facility] Hospital Medicine  Progress Note    Patient Name: Zakiya Garcia  MRN: [id number]  Patient Class: IP- Inpatient   Admission Date: 1/23/2019  Length of Stay: 11 days  Attending Physician: Jay Resendiz MD  Primary Care Provider: Ines Barragan MD        Subjective:     Principal Problem:Acute respiratory acidosis    HPI:  69 year old female with hypertension, dementia, s/p dorota BKA, hyperlipidemia, smoker and dementia who presented with shortness of breath. Per EMS, patient's sats were 90% at room air then 100% after breathing treatment and a dose of solumedrol 125 mg IM. Patient's son reported gradual worsening of non productive cough as well as emesis after coughing this AM. Chest pain when coughing. Patient was last seen in the ED on 1/14 (~10 days PTA) for a UTI. Was prescribed bactrim BID x 7 days. Patient is not sure if still taking metformin or lisinopril. Son will bring list of meds.     In the ED, patient was tachycardic, with low grade fever, with tachypnea and sat 92% at room air. Lungs clear to auscultation and XRay without evidence of consolidation or edema. Labs showed moderate hyperkalemia, acute renal failure and elevated lactic acid. ABG showed pH of 7.0 with hypercapnia. Patient admitted to ICU for close monitoring given such significant metabolic derangements.     Hospital Course:  Ms Garcia presented with shortness of breath and decreased appetite. Workup in the ED significant for moderate hyperkalemia (6.4), acute renal failure, elevated lactic acid (2.3) and acute respiratory acidosis (pH of 7, pCO2 of 74.4). Patient was surprisingly very alert and conversational, but lab results very concerning. ABG without significant change despite BiPAP. Admitted to ICU for close observation. Pulmonology and nephrology consulted. Respiratory acidosis persisted despite multiple attempts on BiPAP. Degree of metabolic acidosis is not severe enough to be main cause. Furosemide  and bicarb did not help much either. Further workup revealed elevated trop of 2 (0.08 in the ED) and LVEF of 25% with global hypokinesis with inferoposterior WMA. This is new. May also have undiagnosed emphysema. Hyperkalemia resolved with shifting and by using kayexalate. Patient clinically improved although not back to baseline. Short course of steroids added to duo-nebs for wheezing. Appetite is poor. Patient was strongly advised to quit smoking (smokes continuously all day, per son), taking so much goody powder and stop carbonated drinks (drinks coke all day). Patient does not appear to be motivated to quit any. Stepped down to floor on 1/27.  PT/OT consulted. Neurology consulted on 1/28 for AMS- thought to be metabolic encephalopathy. Palliative care was consulted on 1/30/19 as patient was not eating and prognosis over-all was poor.  The patient became bi-pap dependent. The patient seemed to have a general decline and on the morning of 2/1/19, I attempted to call both sons to discuss code status.  Palliative care has been asking the family to come to an agreement with a recommendation of DNR.  Family agreed to DNR on 2/1/19.     Interval History: Comfortable     Review of Systems   Unable to perform ROS: Acuity of condition     Objective:     Vital Signs (Most Recent):  Temp: 98.7 °F (37.1 °C) (02/03/19 0738)  Pulse: 99 (02/03/19 0738)  Resp: 18 (02/03/19 0738)  BP: 125/64 (02/03/19 0738)  SpO2: 100 % (02/03/19 0738) Vital Signs (24h Range):  Temp:  [97.7 °F (36.5 °C)-98.9 °F (37.2 °C)] 98.7 °F (37.1 °C)  Pulse:  [] 99  Resp:  [16-26] 18  SpO2:  [98 %-100 %] 100 %  BP: (125-169)/(64-80) 125/64     Weight: 44.7 kg (98 lb 8.7 oz)  Body mass index is 18.02 kg/m².    Intake/Output Summary (Last 24 hours) at 2/3/2019 1036  Last data filed at 2/3/2019 0654  Gross per 24 hour   Intake 1526.25 ml   Output --   Net 1526.25 ml      Physical Exam   Constitutional: She appears well-developed and well-nourished.    HENT:   Head: Normocephalic and atraumatic.   Cardiovascular: Normal rate and regular rhythm.   Pulmonary/Chest: No respiratory distress. She has no wheezes.   Neurological: She is alert.   Skin: Skin is warm.   Psychiatric: She has a normal mood and affect.   Vitals reviewed.           Assessment/Plan:      * Acute respiratory acidosis    Not much improved with BiPAP. Maybe very slight. Degree of metabolic acidosis cannot account for this  Workup thus far revealed new severely depressed LVEF to 25% with inferoposterior WMA  Trop initially mildly elevated to 0.089 now 2. Patient is chest pain free.   Unknown how long EF has been depressed but per son her SOB has been present for days. MI might not be acute  Has had a coronary stent placed 6 years ago, per son. On ASA daily but smokes cigarettes continuously and follows a high salt diet.   Currently not a good candidate for cardiac catheterization  Diuresed. BP at goal. Adequately diuresed and more alert although still with some resp acidosis  Took a lot of goody powder. Salicylate induced? Salicylate level is not elevated  Continue on low flow NC for now. Must quit smoking. Will test for home O2 prior to discharge  Cardiology and pulm on board for co-management- signed off.     Patient continuing on Bi-pap. Attempted to call 2 sons this am as would like answer on code status.  I do not think it is in the best interest of the patient to under go intubation. I also notified Palliative care this am. Given Morphine and checking ABG. For now she is stable but fragile.     Bipap dependent now.  Comfortable. Morphine/ativan as needed.  Palliative care following. Now DNR   Continues in poor condition.  Likely home with hospice on 2/4/19.            Palliative care encounter    Discussed case this am. Family leaning towards DNR and hospice. Will meet with palliative care later.          Debility    PT/OT eval.      Delirium    Neurology consulted. Think may be metabolic  encephalopathy.  +/- EEG. CT negative.        Tobacco use disorder, severe, dependence    Smoking all day long  Spent > 10 minutes discussing tobacco cessation  Patient does not appear to be motivated. Not interested on nicotine patch       NSTEMI (non-ST elevated myocardial infarction)    As above       Acute combined systolic and diastolic heart failure    As above       Acute respiratory failure with hypoxia and hypercapnia    As above       Diabetes mellitus type 2 in nonobese    Hold metformin and start insulin  Adjust as needed for goal -180       Hypertension    Now better controlled  Restart carvedilol. Will add nifedipine if needed for goal SBP < 130       Acute renal failure    Unsure if true renal failure or side effect from bactrim. Son also reports taking a lot of goody powder  Hold off on metformin   Stopped fluids for pulm edema. Holding furosemide as I believe she was over diuresed (dry oral mucosa, better sats, elevated BUN). Renal function improved some with this. Nephrology on board for co-management   BMP in AM  Strict I/Os       Abnormal urinalysis    Abnormal urinalysis  UCx negative  Stop ceftriaxone       Hyperkalemia, diminished renal excretion    2/2 to bactrim vs renal failure vs both  Resolved     Added kayexalate          VTE Risk Mitigation (From admission, onward)        Ordered     heparin (porcine) injection 5,000 Units  Every 12 hours      01/29/19 0816     IP VTE HIGH RISK PATIENT  Once      01/23/19 1630        To home with hospice likely on 2/4/19       Jay Mccurdy MD  Department of Hospital Medicine   [facility]

## 2019-02-03 NOTE — PLAN OF CARE
Problem: Fall Injury Risk  Goal: Absence of Fall and Fall-Related Injury  Outcome: Ongoing (interventions implemented as appropriate)  Intervention: Identify and Manage Contributors to Fall Injury Risk   02/02/19 0736   Manage Acute Allergic Reaction   Medication Review/Management medications reviewed;high risk medications identified   Identify and Manage Contributors to Fall Injury Risk   Self-Care Promotion BADL personal objects within reach         Problem: Adult Inpatient Plan of Care  Goal: Plan of Care Review  Outcome: Ongoing (interventions implemented as appropriate)   02/02/19 1800   Plan of Care Review   Plan of Care Reviewed With patient;caregiver;daughter;son;family       Problem: Skin Injury Risk Increased  Goal: Skin Health and Integrity  Outcome: Ongoing (interventions implemented as appropriate)  Intervention: Optimize Skin Protection   02/02/19 1318 02/02/19 1758   Prevent Additional Skin Injury   Head of Bed (HOB) --  HOB at 30-45 degrees   Pressure Reduction Devices pressure-redistributing mattress utilized --    Pressure Reduction Techniques frequent weight shift encouraged;pressure points protected;sit time limited to 2 hours;rest period provided between sit times;weight shift assistance provided --    Monitor and Manage Hypervolemia   Skin Protection adhesive use limited;skin-to-skin areas padded;incontinence pads utilized --          Problem: Diabetes Comorbidity  Goal: Blood Glucose Level Within Desired Range  Outcome: Ongoing (interventions implemented as appropriate)  Intervention: Maintain Glycemic Control   02/02/19 0512   Monitor and Manage Ketoacidosis   Glycemic Management blood glucose monitoring         Problem: Coping Ineffective  Goal: Effective Coping  Outcome: Ongoing (interventions implemented as appropriate)  Intervention: Support and Enhance Coping Strategies   01/31/19 0431 02/02/19 0736   Monitor/Manage Chemotherapy Gastrointestinal Effects   Environmental Support --  calm  environment promoted   Promote Anxiety Reduction   Family/Support System Care presence promoted --    Supportive Measures --  other (see comments)

## 2019-02-04 LAB
POCT GLUCOSE: 164 MG/DL (ref 70–110)
POCT GLUCOSE: 193 MG/DL (ref 70–110)
POCT GLUCOSE: 201 MG/DL (ref 70–110)
POCT GLUCOSE: 251 MG/DL (ref 70–110)

## 2019-02-04 PROCEDURE — 25000003 PHARM REV CODE 250: Performed by: INTERNAL MEDICINE

## 2019-02-04 PROCEDURE — 99900035 HC TECH TIME PER 15 MIN (STAT)

## 2019-02-04 PROCEDURE — 27000221 HC OXYGEN, UP TO 24 HOURS

## 2019-02-04 PROCEDURE — 21400001 HC TELEMETRY ROOM

## 2019-02-04 PROCEDURE — 94640 AIRWAY INHALATION TREATMENT: CPT

## 2019-02-04 PROCEDURE — 25000242 PHARM REV CODE 250 ALT 637 W/ HCPCS: Performed by: INTERNAL MEDICINE

## 2019-02-04 PROCEDURE — S5010 5% DEXTROSE AND 0.45% SALINE: HCPCS | Performed by: INTERNAL MEDICINE

## 2019-02-04 PROCEDURE — 94761 N-INVAS EAR/PLS OXIMETRY MLT: CPT

## 2019-02-04 PROCEDURE — 99231 PR SUBSEQUENT HOSPITAL CARE,LEVL I: ICD-10-PCS | Mod: ,,, | Performed by: NURSE PRACTITIONER

## 2019-02-04 PROCEDURE — 63600175 PHARM REV CODE 636 W HCPCS: Performed by: INTERNAL MEDICINE

## 2019-02-04 PROCEDURE — 99231 SBSQ HOSP IP/OBS SF/LOW 25: CPT | Mod: ,,, | Performed by: NURSE PRACTITIONER

## 2019-02-04 PROCEDURE — 94660 CPAP INITIATION&MGMT: CPT

## 2019-02-04 PROCEDURE — S0028 INJECTION, FAMOTIDINE, 20 MG: HCPCS | Performed by: INTERNAL MEDICINE

## 2019-02-04 RX ADMIN — IPRATROPIUM BROMIDE AND ALBUTEROL SULFATE 3 ML: .5; 3 SOLUTION RESPIRATORY (INHALATION) at 11:02

## 2019-02-04 RX ADMIN — FAMOTIDINE 20 MG: 10 INJECTION INTRAVENOUS at 08:02

## 2019-02-04 RX ADMIN — PREDNISONE 60 MG: 20 TABLET ORAL at 08:02

## 2019-02-04 RX ADMIN — IPRATROPIUM BROMIDE AND ALBUTEROL SULFATE 3 ML: .5; 3 SOLUTION RESPIRATORY (INHALATION) at 07:02

## 2019-02-04 RX ADMIN — ATORVASTATIN CALCIUM 40 MG: 40 TABLET, FILM COATED ORAL at 08:02

## 2019-02-04 RX ADMIN — INSULIN ASPART 2 UNITS: 100 INJECTION, SOLUTION INTRAVENOUS; SUBCUTANEOUS at 05:02

## 2019-02-04 RX ADMIN — CLOPIDOGREL BISULFATE 75 MG: 75 TABLET ORAL at 08:02

## 2019-02-04 RX ADMIN — IPRATROPIUM BROMIDE AND ALBUTEROL SULFATE 3 ML: .5; 3 SOLUTION RESPIRATORY (INHALATION) at 09:02

## 2019-02-04 RX ADMIN — ASPIRIN 81 MG: 81 TABLET, COATED ORAL at 08:02

## 2019-02-04 RX ADMIN — DEXTROSE AND SODIUM CHLORIDE: 5; .45 INJECTION, SOLUTION INTRAVENOUS at 05:02

## 2019-02-04 RX ADMIN — HEPARIN SODIUM 5000 UNITS: 5000 INJECTION, SOLUTION INTRAVENOUS; SUBCUTANEOUS at 08:02

## 2019-02-04 RX ADMIN — CARVEDILOL 25 MG: 6.25 TABLET, FILM COATED ORAL at 08:02

## 2019-02-04 RX ADMIN — HEPARIN SODIUM 5000 UNITS: 5000 INJECTION, SOLUTION INTRAVENOUS; SUBCUTANEOUS at 10:02

## 2019-02-04 RX ADMIN — IPRATROPIUM BROMIDE AND ALBUTEROL SULFATE 3 ML: .5; 3 SOLUTION RESPIRATORY (INHALATION) at 03:02

## 2019-02-04 RX ADMIN — DEXTROSE AND SODIUM CHLORIDE: 5; .45 INJECTION, SOLUTION INTRAVENOUS at 04:02

## 2019-02-04 NOTE — PROGRESS NOTES
Met with patient's 2 sons Dean and Arsenio this am to discuss discharge plans and hospice. Their father was in hospice so they are familiar with hospice. They agree to hospice. Answered questions and concerns. Updated [place] Rolanda who will give them a list of hospices. Melinda discussed and completed.    -plan is home with hospice tomorrow ([facility] per TN)  -Melinda completed    > 50% of 15 min visit spent in chart review, face to face discussion of goals of care,  symptom assessment, coordination of care and emotional support.       Problem: Goal Outcome Summary  Goal: Goal Outcome Summary  Outcome: No Change  No new concern. Stump intact with sutures. Dressing changed at the site. Pain under control. No problem with appetite and B/B. Continue to monitor.

## 2019-02-04 NOTE — PLAN OF CARE
Problem: Fall Injury Risk  Goal: Absence of Fall and Fall-Related Injury    Intervention: Identify and Manage Contributors to Fall Injury Risk   02/04/19 0612   Identify and Manage Contributors to Fall Injury Risk   Self-Care Promotion safe use of adaptive equipment encouraged     Intervention: Promote Injury-Free Environment   02/04/19 0612   Optimize Balance and Safe Activity   Safety Promotion/Fall Prevention assistive device/personal item within reach;bed alarm set;side rails raised x 2;instructed to call staff for mobility   Optimize [place] and Functional Mobility   Environmental Safety Modification assistive device/personal items within reach;clutter free environment maintained;lighting adjusted;mobility aid in reach;mattress on floor;room near unit station;room organization consistent

## 2019-02-04 NOTE — PLAN OF CARE
Patient will discharge home with hospice tomorrow. [facility] will meet with family today.       02/04/19 1412   Discharge Reassessment   Assessment Type Discharge Planning Reassessment   Provided patient/caregiver education on the expected discharge date and the discharge plan Yes   Do you have any problems affording any of your prescribed medications? Yes   Discharge Plan A Hospice/home   Discharge Plan B Hospice/home   Anticipated Discharge Disposition HospiceHome   Can the patient answer the patient profile reliably? No historian available   How does the patient rate their overall health at the present time? Fair   Describe the patient's ability to walk at the present time. Does not walk or unable to take any steps at all   How often would a person be available to care for the patient? Often   Number of comorbid conditions (as recorded on the chart) Five or more   During the past month, has the patient often been bothered by feeling down, depressed or hopeless? No   During the past month, has the patient often been bothered by little interest or pleasure in doing things? No   Post-Acute Status   Post-Acute Authorization Placement   Post-Acute Placement Status Referrals Sent

## 2019-02-04 NOTE — SUBJECTIVE & OBJECTIVE
Interval History:  No new issues. Comfortable     Review of Systems   Unable to perform ROS: Acuity of condition     Objective:     Vital Signs (Most Recent):  Temp: 98.1 °F (36.7 °C) (02/04/19 0739)  Pulse: 74 (02/04/19 0739)  Resp: 18 (02/04/19 0739)  BP: (!) 141/65 (02/04/19 0739)  SpO2: 98 % (02/04/19 0739) Vital Signs (24h Range):  Temp:  [97.7 °F (36.5 °C)-98.9 °F (37.2 °C)] 98.1 °F (36.7 °C)  Pulse:  [] 74  Resp:  [16-22] 18  SpO2:  [96 %-100 %] 98 %  BP: (132-149)/(65-81) 141/65     Weight: 44.7 kg (98 lb 8.7 oz)  Body mass index is 18.02 kg/m².    Intake/Output Summary (Last 24 hours) at 2/4/2019 1104  Last data filed at 2/4/2019 0700  Gross per 24 hour   Intake 1807.5 ml   Output --   Net 1807.5 ml      Physical Exam   Constitutional: She appears well-developed and well-nourished.   HENT:   Head: Normocephalic and atraumatic.   Cardiovascular: Normal rate and regular rhythm.   Pulmonary/Chest: No respiratory distress. She has no wheezes.   Neurological: She is alert.   Skin: Skin is warm.   Psychiatric: She has a normal mood and affect.   Vitals reviewed.      Significant Labs: BMP: No results for input(s): GLU, NA, K, CL, CO2, BUN, CREATININE, CALCIUM, MG in the last 48 hours.  CBC: No results for input(s): WBC, HGB, HCT, PLT in the last 48 hours.    Significant Imaging:

## 2019-02-04 NOTE — PLAN OF CARE
Problem: Skin Injury Risk Increased  Goal: Skin Health and Integrity  Outcome: Ongoing (interventions implemented as appropriate)  Intervention: Optimize Skin Protection   02/04/19 0612   Prevent Additional Skin Injury   Head of Bed (HOB) HOB at 30-45 degrees   Pressure Reduction Techniques frequent weight shift encouraged

## 2019-02-04 NOTE — NURSING
Pt is possible d/c to home w/ hospice tomorrow. Pt sitting up communicating with family. On 2L NC right now. May need to switch back to bipap to sleep.

## 2019-02-04 NOTE — PROGRESS NOTES
[facility] Hospital Medicine  Progress Note    Patient Name: Zakiya Garcia  MRN: [id number]  Patient Class: IP- Inpatient   Admission Date: 1/23/2019  Length of Stay: 12 days  Attending Physician: Jay Resendiz MD  Primary Care Provider: Ines Barragan MD        Subjective:     Principal Problem:Acute respiratory acidosis    HPI:  69 year old female with hypertension, dementia, s/p dorota BKA, hyperlipidemia, smoker and dementia who presented with shortness of breath. Per EMS, patient's sats were 90% at room air then 100% after breathing treatment and a dose of solumedrol 125 mg IM. Patient's son reported gradual worsening of non productive cough as well as emesis after coughing this AM. Chest pain when coughing. Patient was last seen in the ED on 1/14 (~10 days PTA) for a UTI. Was prescribed bactrim BID x 7 days. Patient is not sure if still taking metformin or lisinopril. Son will bring list of meds.     In the ED, patient was tachycardic, with low grade fever, with tachypnea and sat 92% at room air. Lungs clear to auscultation and XRay without evidence of consolidation or edema. Labs showed moderate hyperkalemia, acute renal failure and elevated lactic acid. ABG showed pH of 7.0 with hypercapnia. Patient admitted to ICU for close monitoring given such significant metabolic derangements.     Hospital Course:  Ms Garcia presented with shortness of breath and decreased appetite. Workup in the ED significant for moderate hyperkalemia (6.4), acute renal failure, elevated lactic acid (2.3) and acute respiratory acidosis (pH of 7, pCO2 of 74.4). Patient was surprisingly very alert and conversational, but lab results very concerning. ABG without significant change despite BiPAP. Admitted to ICU for close observation. Pulmonology and nephrology consulted. Respiratory acidosis persisted despite multiple attempts on BiPAP. Degree of metabolic acidosis is not severe enough to be main cause. Furosemide  and bicarb did not help much either. Further workup revealed elevated trop of 2 (0.08 in the ED) and LVEF of 25% with global hypokinesis with inferoposterior WMA. This is new. May also have undiagnosed emphysema. Hyperkalemia resolved with shifting and by using kayexalate. Patient clinically improved although not back to baseline. Short course of steroids added to duo-nebs for wheezing. Appetite is poor. Patient was strongly advised to quit smoking (smokes continuously all day, per son), taking so much goody powder and stop carbonated drinks (drinks coke all day). Patient does not appear to be motivated to quit any. Stepped down to floor on 1/27.  PT/OT consulted. Neurology consulted on 1/28 for AMS- thought to be metabolic encephalopathy. Palliative care was consulted on 1/30/19 as patient was not eating and prognosis over-all was poor.  The patient became bi-pap dependent. The patient seemed to have a general decline and on the morning of 2/1/19, I attempted to call both sons to discuss code status.  Palliative care has been asking the family to come to an agreement with a recommendation of DNR.  Family agreed to DNR on 2/1/19. Further, hospice was being considered by the family. There was a delay in decision due to out of town family members. Palliative care continued to follow as well.      Interval History:  No new issues. Comfortable     Review of Systems   Unable to perform ROS: Acuity of condition     Objective:     Vital Signs (Most Recent):  Temp: 98.1 °F (36.7 °C) (02/04/19 0739)  Pulse: 74 (02/04/19 0739)  Resp: 18 (02/04/19 0739)  BP: (!) 141/65 (02/04/19 0739)  SpO2: 98 % (02/04/19 0739) Vital Signs (24h Range):  Temp:  [97.7 °F (36.5 °C)-98.9 °F (37.2 °C)] 98.1 °F (36.7 °C)  Pulse:  [] 74  Resp:  [16-22] 18  SpO2:  [96 %-100 %] 98 %  BP: (132-149)/(65-81) 141/65     Weight: 44.7 kg (98 lb 8.7 oz)  Body mass index is 18.02 kg/m².    Intake/Output Summary (Last 24 hours) at 2/4/2019 1104  Last  data filed at 2/4/2019 0700  Gross per 24 hour   Intake 1807.5 ml   Output --   Net 1807.5 ml      Physical Exam   Constitutional: She appears well-developed and well-nourished.   HENT:   Head: Normocephalic and atraumatic.   Cardiovascular: Normal rate and regular rhythm.   Pulmonary/Chest: No respiratory distress. She has no wheezes.   Neurological: She is alert.   Skin: Skin is warm.   Psychiatric: She has a normal mood and affect.   Vitals reviewed.      Significant Labs: BMP: No results for input(s): GLU, NA, K, CL, CO2, BUN, CREATININE, CALCIUM, MG in the last 48 hours.  CBC: No results for input(s): WBC, HGB, HCT, PLT in the last 48 hours.    Significant Imaging:     Assessment/Plan:      * Acute respiratory acidosis    Not much improved with BiPAP. Maybe very slight. Degree of metabolic acidosis cannot account for this  Workup thus far revealed new severely depressed LVEF to 25% with inferoposterior WMA  Trop initially mildly elevated to 0.089 now 2. Patient is chest pain free.   Unknown how long EF has been depressed but per son her SOB has been present for days. MI might not be acute  Has had a coronary stent placed 6 years ago, per son. On ASA daily but smokes cigarettes continuously and follows a high salt diet.   Currently not a good candidate for cardiac catheterization  Diuresed. BP at goal. Adequately diuresed and more alert although still with some resp acidosis  Took a lot of goody powder. Salicylate induced? Salicylate level is not elevated  Continue on low flow NC for now. Must quit smoking. Will test for home O2 prior to discharge  Cardiology and pulm on board for co-management- signed off.     Patient continuing on Bi-pap. Attempted to call 2 sons this am as would like answer on code status.  I do not think it is in the best interest of the patient to under go intubation. I also notified Palliative care this am. Given Morphine and checking ABG. For now she is stable but fragile.     Bipap  dependent now.  Comfortable. Morphine/ativan as needed.  Palliative care following. Now DNR   Continues in poor condition.  Likely home with hospice on 2/4/19.            Palliative care encounter    Discussed case this am. Family leaning towards DNR and hospice. Will meet with palliative care later.          Debility    PT/OT abby.      Delirium    Neurology consulted. Think may be metabolic encephalopathy.  +/- EEG. CT negative.        Tobacco use disorder, severe, dependence    Smoking all day long  Spent > 10 minutes discussing tobacco cessation  Patient does not appear to be motivated. Not interested on nicotine patch       NSTEMI (non-ST elevated myocardial infarction)    As above       Acute combined systolic and diastolic heart failure    As above       Acute respiratory failure with hypoxia and hypercapnia    As above       Diabetes mellitus type 2 in nonobese    Hold metformin and start insulin  Adjust as needed for goal -180       Hypertension    Now better controlled  Restart carvedilol. Will add nifedipine if needed for goal SBP < 130       Acute renal failure    Unsure if true renal failure or side effect from bactrim. Son also reports taking a lot of goody powder  Hold off on metformin   Stopped fluids for pulm edema. Holding furosemide as I believe she was over diuresed (dry oral mucosa, better sats, elevated BUN). Renal function improved some with this. Nephrology on board for co-management   BMP in AM  Strict I/Os       Abnormal urinalysis    Abnormal urinalysis  UCx negative  Stop ceftriaxone       Hyperkalemia, diminished renal excretion    2/2 to bactrim vs renal failure vs both  Resolved     Added kayexalate          VTE Risk Mitigation (From admission, onward)        Ordered     heparin (porcine) injection 5,000 Units  Every 12 hours      01/29/19 0816     IP VTE HIGH RISK PATIENT  Once      01/23/19 1630        Possibly to hospice today. Discussed with .           Jay  MYRTLE Mccurdy MD  Department of Hospital Medicine   [facility]

## 2019-02-04 NOTE — PROGRESS NOTES
1111- TN met with patient's sons Dean and Arsenio to discuss patient's discharge plan. Both sons are in agreement with hospice. TN informed  Dean that she will contact [facility] to schedule a meeting.     5340- TN sent patient's clinicals (Packet, MD notes, and MAR) to [facility].     1202- Call received from Reny at [facility]. Reny will contact Dean to arrange a meeting.

## 2019-02-04 NOTE — NURSING
Received report from BENNY Velasco. Family at bedside. She is more alert and chatting with family. On oxygen per nasal canula.  No pain. No sign of distress. Daughter will be staying overnight. IV line intact over rt hand/wrsit - infusing. Mouth quite dry - wet and clean it with sponge. Call bell on reach, instructed to call for assistance all the time. Bed on lowest position. Bed alarm on. Safety maintained.

## 2019-02-05 VITALS
WEIGHT: 98.56 LBS | SYSTOLIC BLOOD PRESSURE: 105 MMHG | RESPIRATION RATE: 17 BRPM | OXYGEN SATURATION: 98 % | HEART RATE: 88 BPM | TEMPERATURE: 98 F | BODY MASS INDEX: 18.14 KG/M2 | HEIGHT: 62 IN | DIASTOLIC BLOOD PRESSURE: 56 MMHG

## 2019-02-05 LAB
POCT GLUCOSE: 197 MG/DL (ref 70–110)
POCT GLUCOSE: 208 MG/DL (ref 70–110)

## 2019-02-05 PROCEDURE — 99231 PR SUBSEQUENT HOSPITAL CARE,LEVL I: ICD-10-PCS | Mod: ,,, | Performed by: NURSE PRACTITIONER

## 2019-02-05 PROCEDURE — 27000221 HC OXYGEN, UP TO 24 HOURS

## 2019-02-05 PROCEDURE — 99231 SBSQ HOSP IP/OBS SF/LOW 25: CPT | Mod: ,,, | Performed by: NURSE PRACTITIONER

## 2019-02-05 PROCEDURE — 25000242 PHARM REV CODE 250 ALT 637 W/ HCPCS: Performed by: INTERNAL MEDICINE

## 2019-02-05 PROCEDURE — 25000003 PHARM REV CODE 250: Performed by: INTERNAL MEDICINE

## 2019-02-05 PROCEDURE — 99900035 HC TECH TIME PER 15 MIN (STAT)

## 2019-02-05 PROCEDURE — S0028 INJECTION, FAMOTIDINE, 20 MG: HCPCS | Performed by: INTERNAL MEDICINE

## 2019-02-05 PROCEDURE — 94761 N-INVAS EAR/PLS OXIMETRY MLT: CPT

## 2019-02-05 PROCEDURE — 63600175 PHARM REV CODE 636 W HCPCS: Performed by: INTERNAL MEDICINE

## 2019-02-05 PROCEDURE — 94640 AIRWAY INHALATION TREATMENT: CPT

## 2019-02-05 PROCEDURE — S5010 5% DEXTROSE AND 0.45% SALINE: HCPCS | Performed by: INTERNAL MEDICINE

## 2019-02-05 RX ORDER — ASPIRIN 81 MG/1
81 TABLET ORAL DAILY
Refills: 0 | COMMUNITY
Start: 2019-02-06 | End: 2020-02-06

## 2019-02-05 RX ORDER — ATORVASTATIN CALCIUM 20 MG/1
40 TABLET, FILM COATED ORAL DAILY
Qty: 60 TABLET | Refills: 2 | Status: SHIPPED | OUTPATIENT
Start: 2019-02-05 | End: 2019-05-06

## 2019-02-05 RX ORDER — CARVEDILOL 6.25 MG/1
25 TABLET ORAL 2 TIMES DAILY
Qty: 240 TABLET | Refills: 2 | Status: SHIPPED | OUTPATIENT
Start: 2019-02-05 | End: 2019-05-06

## 2019-02-05 RX ORDER — CLOPIDOGREL BISULFATE 75 MG/1
75 TABLET ORAL DAILY
Qty: 30 TABLET | Refills: 11 | Status: SHIPPED | OUTPATIENT
Start: 2019-02-06 | End: 2020-02-06

## 2019-02-05 RX ORDER — IPRATROPIUM BROMIDE AND ALBUTEROL SULFATE 2.5; .5 MG/3ML; MG/3ML
3 SOLUTION RESPIRATORY (INHALATION) EVERY 4 HOURS PRN
Qty: 1 BOX | Refills: 3 | Status: SHIPPED | OUTPATIENT
Start: 2019-02-05 | End: 2020-02-05

## 2019-02-05 RX ADMIN — PREDNISONE 60 MG: 20 TABLET ORAL at 08:02

## 2019-02-05 RX ADMIN — HEPARIN SODIUM 5000 UNITS: 5000 INJECTION, SOLUTION INTRAVENOUS; SUBCUTANEOUS at 08:02

## 2019-02-05 RX ADMIN — FAMOTIDINE 20 MG: 10 INJECTION INTRAVENOUS at 09:02

## 2019-02-05 RX ADMIN — DEXTROSE AND SODIUM CHLORIDE: 5; .45 INJECTION, SOLUTION INTRAVENOUS at 05:02

## 2019-02-05 RX ADMIN — IPRATROPIUM BROMIDE AND ALBUTEROL SULFATE 3 ML: .5; 3 SOLUTION RESPIRATORY (INHALATION) at 09:02

## 2019-02-05 RX ADMIN — IPRATROPIUM BROMIDE AND ALBUTEROL SULFATE 3 ML: .5; 3 SOLUTION RESPIRATORY (INHALATION) at 12:02

## 2019-02-05 RX ADMIN — ASPIRIN 81 MG: 81 TABLET, COATED ORAL at 08:02

## 2019-02-05 RX ADMIN — CARVEDILOL 25 MG: 6.25 TABLET, FILM COATED ORAL at 09:02

## 2019-02-05 RX ADMIN — ATORVASTATIN CALCIUM 40 MG: 40 TABLET, FILM COATED ORAL at 08:02

## 2019-02-05 RX ADMIN — CLOPIDOGREL BISULFATE 75 MG: 75 TABLET ORAL at 08:02

## 2019-02-05 RX ADMIN — INSULIN ASPART 1 UNITS: 100 INJECTION, SOLUTION INTRAVENOUS; SUBCUTANEOUS at 01:02

## 2019-02-05 NOTE — PLAN OF CARE
Problem: Fall Injury Risk  Goal: Absence of Fall and Fall-Related Injury    Intervention: Identify and Manage Contributors to Fall Injury Risk   02/04/19 1809   Manage Acute Allergic Reaction   Medication Review/Management medications reviewed   Identify and Manage Contributors to Fall Injury Risk   Self-Care Promotion independence encouraged;BADL personal objects within reach;meal setup provided         Problem: Diabetes Comorbidity  Goal: Blood Glucose Level Within Desired Range  Outcome: Ongoing (interventions implemented as appropriate)  Intervention: Maintain Glycemic Control   02/04/19 1810   Monitor and Manage Ketoacidosis   Glycemic Management blood glucose monitoring;oral hydration promoted;supplemental insulin given

## 2019-02-05 NOTE — PLAN OF CARE
Problem: Infection  Goal: Infection Symptom Resolution  Outcome: Ongoing (interventions implemented as appropriate)  Intervention: Prevent or Manage Infection   01/29/19 1425 02/05/19 0451   Prevent or Manage Infection   Fever Reduction/Comfort Measures lightweight clothing;lightweight bedding --    Infection Management --  aseptic technique maintained   Manage Diarrhea   Isolation Precautions --  protective environment maintained

## 2019-02-05 NOTE — PROGRESS NOTES
Went by room to make sure everything was on board for hospice. Patient is alert and oriented x 3. She does not have her Bipap on and denies feeling SOB at present although she appears to be. I was able to discuss her condition with her and that she will be going home on hospice. She states she knows what hospice. She states she is comfortable at present. She was open about dying and states she is ready when it is time. I reaffirmed DNR with her and she agreed. Her son Dean came in with hospice nurse and I informed him of our conversation and this seems to give him peace that they had made the right choice for her. Answered all questions. Emotional support provided.     > 50% of 15 min visit spent in chart review, face to face discussion of goals of care,  symptom assessment, coordination of care and emotional support.

## 2019-02-05 NOTE — PROGRESS NOTES
1216- Call received from Reny at [facility]. Reny stated patient's equipment is being delivered to patient's home now.     1313- ADT-30 contacted to arrange patient's transportation home. Awaiting ETA.  placed for 2:00pm.     1340- Patient will travel via stretcher with oxygen.  Damariian will arrive at 2:00pm.

## 2019-02-05 NOTE — PLAN OF CARE
Patient's nurse informed that patient can discharge from  standpoint.     1321- Discharge packet provided to patient's nurse Ellen.     1328- Patient's son Dean informed that transportation has been arranged at 2:00pm.        02/05/19 1334   Final Note   Assessment Type Final Discharge Note   Anticipated Discharge Disposition HospiceHome   What phone number can be called within the next 1-3 days to see how you are doing after discharge? ([phone])   Right Care Referral Info   Post Acute Recommendation Home-care   Facility Name ([facility] )

## 2019-02-05 NOTE — DISCHARGE SUMMARY
[facility] Hospital Medicine  Discharge Summary      Patient Name: Zakiya Garcia  MRN: [id number]  Admission Date: 1/23/2019  Hospital Length of Stay: 13 days  Discharge Date and Time:  02/05/2019 11:55 AM  Attending Physician: Gwendolyn Gómez MD   Discharging Provider: Gwendolyn Gómez MD  Primary Care Provider: Ines Barragan MD      HPI:   69 year old female with hypertension, dementia, s/p dorota BKA, hyperlipidemia, smoker and dementia who presented with shortness of breath. Per EMS, patient's sats were 90% at room air then 100% after breathing treatment and a dose of solumedrol 125 mg IM. Patient's son reported gradual worsening of non productive cough as well as emesis after coughing this AM. Chest pain when coughing. Patient was last seen in the ED on 1/14 (~10 days PTA) for a UTI. Was prescribed bactrim BID x 7 days. Patient is not sure if still taking metformin or lisinopril. Son will bring list of meds.     In the ED, patient was tachycardic, with low grade fever, with tachypnea and sat 92% at room air. Lungs clear to auscultation and XRay without evidence of consolidation or edema. Labs showed moderate hyperkalemia, acute renal failure and elevated lactic acid. ABG showed pH of 7.0 with hypercapnia. Patient admitted to ICU for close monitoring given such significant metabolic derangements.     * No surgery found *      Hospital Course:   Ms Garcia presented with shortness of breath and decreased appetite. Workup in the ED significant for moderate hyperkalemia (6.4), acute renal failure, elevated lactic acid (2.3) and acute respiratory acidosis (pH of 7, pCO2 of 74.4). Patient was surprisingly very alert and conversational, but lab results very concerning. ABG without significant change despite BiPAP. Admitted to ICU for close observation. Pulmonology and nephrology consulted. Respiratory acidosis persisted despite multiple attempts on BiPAP. Degree of metabolic acidosis is not severe enough  to be main cause. Furosemide and bicarb did not help much either. Further workup revealed elevated trop of 2 (0.08 in the ED) and LVEF of 25% with global hypokinesis with inferoposterior WMA. This is new. May also have undiagnosed emphysema. Hyperkalemia resolved with shifting and by using kayexalate. Patient clinically improved although not back to baseline. Short course of steroids added to duo-nebs for wheezing. Appetite is poor. Patient was strongly advised to quit smoking (smokes continuously all day, per son), taking so much goody powder and stop carbonated drinks (drinks coke all day). Patient does not appear to be motivated to quit any. Stepped down to floor on 1/27.  PT/OT consulted. Neurology consulted on 1/28 for AMS- thought to be metabolic encephalopathy. Palliative care was consulted on 1/30/19 as patient was not eating and prognosis over-all was poor.  The patient became bi-pap dependent. The patient seemed to have a general decline and on the morning of 2/1/19, I attempted to call both sons to discuss code status.  Palliative care has been asking the family to come to an agreement with a recommendation of DNR.  Family agreed to DNR on 2/1/19. Further, hospice was being considered by the family. There was a delay in decision due to out of town family members. Palliative care continued to follow as well.  Patient is more awake and alert on 2/5 and tolerating oxygen by nasal cannula. She confirms DNR status and wants hospice. Will arrange for home oxygen (SpO2 97% on room air at rest, unable to exercise, needs for hospice care), home BiPAP, and home hospital bed. Discharge to home with hospice.      Consults:   Consults (From admission, onward)        Status Ordering Provider     Inpatient consult to Cardiology  Once     Provider:  Sebastian Sanchez MD    Completed ERIK GREWAL     Inpatient consult to Nephrology  Once     Provider:  Vianey Gasca MD    Completed ERIK GREWAL     Inpatient  "consult to Neurology  Once     Provider:  Chuy Castillo MD    Completed NAHOMY NO     Inpatient consult to Palliative Care  Once     Provider:  Chanell Wilson RN    Completed NAHOMY NO     Inpatient consult to Pulmonology  Once     Provider:  Rian Rust MD    Completed ERIK GREWAL     Inpatient consult to Spiritual Care  Once     Provider:  (Not yet assigned)    Acknowledged NAHOMY NO          No new Assessment & Plan notes have been filed under this hospital service since the last note was generated.  Service: Hospital Medicine    Final Active Diagnoses:    Diagnosis Date Noted POA    PRINCIPAL PROBLEM:  Acute respiratory acidosis [E87.2] 01/23/2019 Yes    Palliative care encounter [Z51.5]  Not Applicable    Severe malnutrition [E43] 01/30/2019 Yes    Debility [R53.81] 01/29/2019 Yes    Delirium [R41.0] 01/26/2019 Yes    Pulmonary hypertension [I27.20] 01/24/2019 Yes    Acute combined systolic and diastolic heart failure [I50.41] 01/24/2019 Yes    NSTEMI (non-ST elevated myocardial infarction) [I21.4] 01/24/2019 Yes    Tobacco use disorder, severe, dependence [F17.200] 01/24/2019 Yes    Acute respiratory failure with hypoxia and hypercapnia [J96.01, J96.02] 01/23/2019 Yes    Acute renal failure [N17.9] 08/10/2013 Yes    Diabetes mellitus type 2 in nonobese [E11.9] 08/10/2013 Yes    Hypertension [I10] 08/10/2013 Yes    Abnormal urinalysis [R82.90] 08/10/2013 Yes    Hyperkalemia, diminished renal excretion [E87.5] 08/09/2013 Yes      Problems Resolved During this Admission:       Discharged Condition: stable    Disposition: Hospice/Home    Follow Up: with home hospice company    Patient Instructions:      HOSPITAL BED FOR HOME USE     Order Specific Question Answer Comments   Type: Semi-electric    Length of need (1-99 months): 99    Does patient have medical equipment at home? wheelchair    Height: 5' 2" (1.575 m)    Weight: 44.7 kg (98 lb 8.7 oz)  " "  Please check all that apply: Patient requires, for the alleviation of pain, positioning of the body in ways not feasible in an ordinary bed.    Please check all that apply: Patient requires the head of bed to be elevated more than 30 degrees most of the time due to congestive heart failure, chronic pulmonary disease, or aspiration.  Pillows and wedges have been considered and ruled out.      NEBULIZER KIT (SUPPLIES) FOR HOME USE     Order Specific Question Answer Comments   Height: 5' 2" (1.575 m)    Weight: 44.7 kg (98 lb 8.7 oz)    Does patient have medical equipment at home? wheelchair    Length of need (1-99 months): 99    Mask or Mouthpiece? Mask      NEBULIZER FOR HOME USE     Order Specific Question Answer Comments   Height: 5' 2" (1.575 m)    Weight: 44.7 kg (98 lb 8.7 oz)    Does patient have medical equipment at home? wheelchair    Length of need (1-99 months): 99      BIPAP FOR HOME USE     Order Specific Question Answer Comments   Type: BiPap    BiPap setting (cmH20) Inspiratory: 10    BiPap setting (cmH20) Expiratory: 5    Length of need (1-99 months): 99    Humidification: Heated    Type of mask: FFM    Accessories needed: Headgear    Accessories needed: Tubing    Accessories needed: Humidifier chamber    Accessories needed: Chin strap    Accessories needed: Filters    BiPap supply refills: 12      OXYGEN FOR HOME USE     Order Specific Question Answer Comments   Liter Flow 2    Duration Continuous    Qualifying SpO2: 97% patient is hospice   Testing done at: [facility]    Device home concentrator    Height: 5' 2" (1.575 m)    Weight: 44.7 kg (98 lb 8.7 oz)    Does patient have medical equipment at home? wheelchair    Alternative treatment measures have been tried or considered and deemed clinically ineffective. Yes      Diet Cardiac     Notify your health care provider if you experience any of the following:   Order Comments: Notify hospice company for any problems     Activity as tolerated "       Significant Diagnostic Studies: Labs: All labs within the past 24 hours have been reviewed    Pending Diagnostic Studies:     None         Medications:  Reconciled Home Medications:      Medication List      START taking these medications    albuterol-ipratropium 2.5 mg-0.5 mg/3 mL nebulizer solution  Commonly known as:  DUO-NEB  Take 3 mLs by nebulization every 4 (four) hours as needed for Wheezing or Shortness of Breath. Rescue     aspirin 81 MG EC tablet  Commonly known as:  ECOTRIN  Take 1 tablet (81 mg total) by mouth once daily.  Start taking on:  2/6/2019     clopidogrel 75 mg tablet  Commonly known as:  PLAVIX  Take 1 tablet (75 mg total) by mouth once daily.  Start taking on:  2/6/2019        CHANGE how you take these medications    atorvastatin 20 MG tablet  Commonly known as:  LIPITOR  Take 2 tablets (40 mg total) by mouth once daily.  What changed:    · how much to take  · when to take this     carvedilol 6.25 MG tablet  Commonly known as:  COREG  Take 4 tablets (25 mg total) by mouth 2 (two) times daily.  What changed:  how much to take        CONTINUE taking these medications    donepezil 5 MG tablet  Commonly known as:  ARICEPT  Take 5 mg by mouth every evening.     lisinopril 5 MG tablet  Commonly known as:  PRINIVIL,ZESTRIL  Take 5 mg by mouth once daily.        STOP taking these medications    amLODIPine 10 MG tablet  Commonly known as:  NORVASC     hydroCHLOROthiazide 25 MG tablet  Commonly known as:  HYDRODIURIL            Indwelling Lines/Drains at time of discharge:   Lines/Drains/Airways          None          Time spent on the discharge of patient: 45 minutes  Patient was seen and examined on the date of discharge and determined to be suitable for discharge.         Gwendolyn Gómez MD  Department of Hospital Medicine  [facility]

## 2019-02-05 NOTE — PLAN OF CARE
Problem: Diabetes Comorbidity  Goal: Blood Glucose Level Within Desired Range    Intervention: Maintain Glycemic Control   02/05/19 1328   Monitor and Manage Ketoacidosis   Glycemic Management blood glucose monitoring;supplemental insulin given

## 2019-02-05 NOTE — PLAN OF CARE
Problem: Fall Injury Risk  Goal: Absence of Fall and Fall-Related Injury  Outcome: Outcome(s) achieved Date Met: 02/04/19  Intervention: Identify and Manage Contributors to Fall Injury Risk   02/04/19 3981   Manage Acute Allergic Reaction   Medication Review/Management medications reviewed   Identify and Manage Contributors to Fall Injury Risk   Self-Care Promotion independence encouraged;BADL personal objects within reach;meal setup provided

## 2019-02-05 NOTE — PROGRESS NOTES
TN contacted Reny at [facility]. Reny stated a rep is scheduled to meet with patient's family today at 9:00am. TN informed Reny  that patient will need a hospital bed, home oxygen and a Bipap. Reny stated they will order patient's DME and notify TN once delivered to patient's home.    Awaiting callback from Reny to arrange transportation.     3860- Call received from Kaye at [facility]. All consents were signed by patient's son. Awaiting delivery of home DME.     1744- TN sent patient's POC to [facility] via [employer].

## 2019-02-05 NOTE — NURSING
Beside report given to night nurse Kimi. Walking rounds completed. Visualized and assessed patient NAD noted. Safety precautions maintained and call light within reach.

## 2019-02-05 NOTE — PLAN OF CARE
[facility]    HOSPICE  ORDERS    02/05/2019    Admit to Hospice:  Home Service     Diagnoses:   Active Hospital Problems    Diagnosis  POA    *Acute respiratory acidosis [E87.2]  Yes    Palliative care encounter [Z51.5]  Not Applicable    Severe malnutrition [E43]  Yes    Debility [R53.81]  Yes    Delirium [R41.0]  Yes    Pulmonary hypertension [I27.20]  Yes    Acute combined systolic and diastolic heart failure [I50.41]  Yes    NSTEMI (non-ST elevated myocardial infarction) [I21.4]  Yes    Tobacco use disorder, severe, dependence [F17.200]  Yes    Acute respiratory failure with hypoxia and hypercapnia [J96.01, J96.02]  Yes    Acute renal failure [N17.9]  Yes    Diabetes mellitus type 2 in nonobese [E11.9]  Yes    Hypertension [I10]  Yes    Abnormal urinalysis [R82.90]  Yes    Hyperkalemia, diminished renal excretion [E87.5]  Yes      Resolved Hospital Problems   No resolved problems to display.       Hospice Qualifying Diagnoses:        Patient has a life expectancy < 6 months due to:  Primary Hospice Diagnosis: Chronic respiratory failure due to COPD and chronic systolic and diastolic congestive heart failure    Comorbid Conditions Contributing to Decline: NSTEMI, Diabetes, Hypertension,  Tobacco dependence    Vital Signs: Routine per Hospice Protocol.    Code Status: DNR    Allergies: Review of patient's allergies indicates:  No Known Allergies    Diet: Cardiac dysphagia pureed    Activities: As tolerated    Nursing: Per Hospice Routine.     Routine Skin for Bedridden Patients: Apply moisture barrier cream to all skin folds and wet areas in perineal area daily and after baths and all bowel movements.    Oxygen: 2L NC continuous with goal SpO2 88-92%. BiPAP to be used PRN for shortness of breath.     Medications:      Zakiya Garcia   Home Medication Instructions DERICK:[id number]    Printed on:02/05/19 8319   Medication Information                      albuterol-ipratropium (DUO-NEB)  2.5 mg-0.5 mg/3 mL nebulizer solution  Take 3 mLs by nebulization every 4 (four) hours as needed for Wheezing or Shortness of Breath. Rescue             aspirin (ECOTRIN) 81 MG EC tablet  Take 1 tablet (81 mg total) by mouth once daily.             atorvastatin (LIPITOR) 20 MG tablet  Take 2 tablets (40 mg total) by mouth once daily.             carvedilol (COREG) 6.25 MG tablet  Take 4 tablets (25 mg total) by mouth 2 (two) times daily.             clopidogrel (PLAVIX) 75 mg tablet  Take 1 tablet (75 mg total) by mouth once daily.             donepezil (ARICEPT) 5 MG tablet  Take 5 mg by mouth every evening.             lisinopril (PRINIVIL,ZESTRIL) 5 MG tablet  Take 5 mg by mouth once daily.                 Future Orders:  Hospice Medical Director may dictate new orders for comfortable care measures & sign death certificate.      _________________________________  Gwendolyn Gómez MD  02/05/2019

## 2019-02-05 NOTE — PLAN OF CARE
02/05/19 1323   Medicare Message   Important Message from Medicare regarding Discharge Appeal Rights Given to patient/caregiver;Signed/date by patient/caregiver;Explained to patient/caregiver   Date IMM was signed 02/05/19   Time IMM was signed 1319   Copy provided to patient.

## 2019-02-05 NOTE — NURSING
In preparation for discharge, removal of patient's saline lock and heart monitor per policy. Discharge instructions giving to patient. Patient verbalized understanding. Patient is waiting for transportation to take her home.No distress noted.

## 2019-02-06 NOTE — PHYSICIAN QUERY
PT Name: Zakiya Garcia  MR #: [id number]     Physician Query Form - Documentation Clarification      CDS/: Aleta Dietrich               Contact information:[email or web address]    This form is a permanent document in the medical record.     Query Date: February 6, 2019    By submitting this query, we are merely seeking further clarification of documentation. Please utilize your independent clinical judgment when addressing the question(s) below.    The Medical record reflects the following:    Supporting Clinical Findings Location in Medical Record     Workup thus far revealed new severely depressed LVEF to 25% with inferoposterior WMA    Trop initially mildly elevated to 0.089 now 2. Patient is chest pain free.     Unknown how long EF has been depressed but per son her SOB has been present for days.     MI might not be acute  Has had a coronary stent placed 6 years ago, per son                   HM PN 1/25 - 2/4     Further workup revealed elevated trop of 2 (0.08 in the ED) and LVEF of 25% with global hypokinesis with inferoposterior WMA. This is new    NSTEMI (non-ST elevated myocardial infarction)    Noted: 01/24/2019   POA: Yes      Comorbid Conditions Contributing to Decline: NSTEMI, Diabetes, Hypertension,  Tobacco dependence               DS 2/5              POC note 2/5                                                                            Doctor, Due to unclear documentation, Please specify applicable diagnosis for this admission.    Provider Use Only      [ x  ]  Acute NSTEMI    [   ]  Old MI    [   ]  Other explanations with details____________________________________                                                                                                               [  ] Clinically Undetermined

## 2019-02-11 NOTE — NURSING
Dr. Resendiz notified patient has increasing restlessness. All over the bed. Heart rate 122 spo2 97% on 02 3L per n/c. Ativan ordered.    Breath sounds clear and equal bilaterally.

## 2024-03-12 NOTE — CONSULTS
[facility]  Palliative Medicine  Consult Note    Patient Name: Zakiya Garcia  MRN: [id number]  Admission Date: 1/23/2019  Hospital Length of Stay: 8 days  Code Status: Full Code   Attending Provider: Jay Resendiz MD  Consulting Provider: Pia Bianchi NP  Primary Care Physician: Ines Barragan MD  Principal Problem:Acute respiratory acidosis    Patient information was obtained from past medical records and ER records.          Thank you for your consult. I will follow-up with patient. Please contact us if you have any additional questions.    Subjective:     HPI:   Principal Problem:Acute respiratory acidosis     HPI:  69 year old female with hypertension, dementia, s/p dorota BKA, hyperlipidemia, smoker and dementia who presented with shortness of breath. Per EMS, patient's sats were 90% at room air then 100% after breathing treatment and a dose of solumedrol 125 mg IM. Patient's son reported gradual worsening of non productive cough as well as emesis after coughing this AM. Chest pain when coughing. Patient was last seen in the ED on 1/14 (~10 days PTA) for a UTI. Was prescribed bactrim BID x 7 days. Patient is not sure if still taking metformin or lisinopril. Son will bring list of meds.      In the ED, patient was tachycardic, with low grade fever, with tachypnea and sat 92% at room air. Lungs clear to auscultation and XRay without evidence of consolidation or edema. Labs showed moderate hyperkalemia, acute renal failure and elevated lactic acid. ABG showed pH of 7.0 with hypercapnia. Patient admitted to ICU for close monitoring given such significant metabolic derangements.      Hospital Course:  Ms Garcia presented with shortness of breath and decreased appetite. Workup in the ED significant for moderate hyperkalemia (6.4), acute renal failure, elevated lactic acid (2.3) and acute respiratory acidosis (pH of 7, pCO2 of 74.4). Patient was surprisingly very alert and conversational,  but lab results very concerning. ABG without significant change despite BiPAP. Admitted to ICU for close observation. Pulmonology and nephrology consulted. Respiratory acidosis persisted despite multiple attempts on BiPAP. Degree of metabolic acidosis is not severe enough to be main cause. Furosemide and bicarb did not help much either. Further workup revealed elevated trop of 2 (0.08 in the ED) and LVEF of 25% with global hypokinesis with inferoposterior WMA. This is new. May also have undiagnosed emphysema. Hyperkalemia resolved with shifting and by using kayexalate. Patient clinically improved although not back to baseline. Short course of steroids added to duo-nebs for wheezing. Appetite is poor. Patient was strongly advised to quit smoking (smokes continuously all day, per son), taking so much goody powder and stop carbonated drinks (drinks coke all day). Patient does not appear to be motivated to quit any. Stepped down to floor on 1/27.  PT/OT consulted. Neurology consulted on 1/28 for AMS- thought to be metabolic encephalopathy. Palliative care was consulted on 1/30/19 as patient was not eating and prognosis over-all was poor.             Hospital Course:  No notes on file        Past Medical History:   Diagnosis Date    Amputee, below knee bilateral    Cigarette smoker     Dementia     Diabetes mellitus     Hypertension     Requires assistance with activities of daily living (ADL)     Wheelchair dependent        Past Surgical History:   Procedure Laterality Date    bka      cardiac stents      HYSTERECTOMY         Review of patient's allergies indicates:  No Known Allergies    Medications:  Continuous Infusions:  Scheduled Meds:   albuterol-ipratropium  3 mL Nebulization Q4H WAKE    aspirin  81 mg Oral Daily    atorvastatin  40 mg Oral Daily    carvedilol  25 mg Oral BID    clopidogrel  75 mg Oral Daily    famotidine (PF)  20 mg Intravenous Daily    heparin (porcine)  5,000 Units  Subcutaneous Q12H    predniSONE  60 mg Oral Daily     PRN Meds:acetaminophen, dextrose 50%, glucagon (human recombinant), hydrALAZINE, insulin aspart U-100, lorazepam, ondansetron, sodium chloride 0.9%    Family History     None        Tobacco Use    Smoking status: Current Every Day Smoker     Packs/day: 1.00     Years: 15.00     Pack years: 15.00     Types: Cigarettes    Smokeless tobacco: Never Used   Substance and Sexual Activity    Alcohol use: No    Drug use: No    Sexual activity: Not on file       Review of Systems   Unable to perform ROS: Acuity of condition     Objective:     Vital Signs (Most Recent):  Temp: 97.9 °F (36.6 °C) (01/31/19 0741)  Pulse: 102 (01/31/19 0741)  Resp: 17 (01/31/19 0741)  BP: (!) 147/79 (01/31/19 0741)  SpO2: 100 % (01/31/19 0741) Vital Signs (24h Range):  Temp:  [97.8 °F (36.6 °C)-98.7 °F (37.1 °C)] 97.9 °F (36.6 °C)  Pulse:  [] 102  Resp:  [17-20] 17  SpO2:  [97 %-100 %] 100 %  BP: (130-167)/(74-83) 147/79     Weight: 44.7 kg (98 lb 8.7 oz)  Body mass index is 18.02 kg/m².        Physical Exam   Pulmonary/Chest:   bipap   Abdominal: Soft. Bowel sounds are normal.   Musculoskeletal:   Bilateral BKA   Neurological:   obtunded   Skin: Skin is warm and dry.   Nursing note and vitals reviewed.      Significant Labs: All pertinent labs within the past 24 hours have been reviewed.    Significant Imaging: I have reviewed all pertinent imaging results/findings within the past 24 hours.    Advanced Directives::  Living Will: No  LaPOST: No  Do Not Resuscitate Status: Patient is a full code  Medical Power of : No    Decision-Making Capacity: Patient unable to communicate due to disease severity/cognitive impairment    Living Arrangements: Lives with family; son    ASSESSMENT/PLAN:    Palliative encounter:      Bedside consult. Patient on bipap and hard to arouse. She will open her eyes and start grunting but nothing else. No family at bedside. I will contact family to  arrange meeting to discuss GOC.          > 50% of 50 min visit spent in chart review, face to face discussion of goals of care,  symptom assessment, coordination of care and emotional support.    Pia Bianchi NP  Palliative Medicine  [facility]             no